# Patient Record
Sex: FEMALE | Race: WHITE | Employment: PART TIME | URBAN - METROPOLITAN AREA
[De-identification: names, ages, dates, MRNs, and addresses within clinical notes are randomized per-mention and may not be internally consistent; named-entity substitution may affect disease eponyms.]

---

## 2017-04-11 ENCOUNTER — HOSPITAL ENCOUNTER (OUTPATIENT)
Dept: MRI IMAGING | Age: 67
Discharge: HOME | End: 2017-04-11
Payer: MEDICARE

## 2017-04-11 DIAGNOSIS — M17.9 OSTEOARTHRITIS OF KNEE, UNSPECIFIED LATERALITY, UNSPECIFIED OSTEOARTHRITIS TYPE: ICD-10-CM

## 2017-04-11 PROCEDURE — 73721 MRI JNT OF LWR EXTRE W/O DYE: CPT

## 2023-03-29 LAB
ALBUMIN (G/DL) IN SER/PLAS: 4.3 G/DL (ref 3.4–5)
ALBUMIN (MG/L) IN URINE: <7 MG/L
ALBUMIN/CREATININE (UG/MG) IN URINE: NORMAL UG/MG CRT (ref 0–30)
ANION GAP IN SER/PLAS: 11 MMOL/L (ref 10–20)
APPEARANCE, URINE: CLEAR
BACTERIA, URINE: ABNORMAL /HPF
BILIRUBIN, URINE: NEGATIVE
BLOOD, URINE: NEGATIVE
CALCIDIOL (25 OH VITAMIN D3) (NG/ML) IN SER/PLAS: 40 NG/ML
CALCIUM (MG/DL) IN SER/PLAS: 9.8 MG/DL (ref 8.6–10.3)
CARBON DIOXIDE, TOTAL (MMOL/L) IN SER/PLAS: 29 MMOL/L (ref 21–32)
CHLORIDE (MMOL/L) IN SER/PLAS: 102 MMOL/L (ref 98–107)
COLOR, URINE: YELLOW
CREATININE (MG/DL) IN SER/PLAS: 1.11 MG/DL (ref 0.5–1.05)
CREATININE (MG/DL) IN URINE: 61.5 MG/DL (ref 20–320)
ERYTHROCYTE DISTRIBUTION WIDTH (RATIO) BY AUTOMATED COUNT: 13.2 % (ref 11.5–14.5)
ERYTHROCYTE MEAN CORPUSCULAR HEMOGLOBIN CONCENTRATION (G/DL) BY AUTOMATED: 33.5 G/DL (ref 32–36)
ERYTHROCYTE MEAN CORPUSCULAR VOLUME (FL) BY AUTOMATED COUNT: 91 FL (ref 80–100)
ERYTHROCYTES (10*6/UL) IN BLOOD BY AUTOMATED COUNT: 4.37 X10E12/L (ref 4–5.2)
GFR FEMALE: 52 ML/MIN/1.73M2
GLUCOSE (MG/DL) IN SER/PLAS: 120 MG/DL (ref 74–99)
GLUCOSE, URINE: NEGATIVE MG/DL
HEMATOCRIT (%) IN BLOOD BY AUTOMATED COUNT: 39.7 % (ref 36–46)
HEMOGLOBIN (G/DL) IN BLOOD: 13.3 G/DL (ref 12–16)
KETONES, URINE: NEGATIVE MG/DL
LEUKOCYTE ESTERASE, URINE: ABNORMAL
LEUKOCYTES (10*3/UL) IN BLOOD BY AUTOMATED COUNT: 8.3 X10E9/L (ref 4.4–11.3)
NITRITE, URINE: NEGATIVE
PH, URINE: 5 (ref 5–8)
PHOSPHATE (MG/DL) IN SER/PLAS: 3.1 MG/DL (ref 2.5–4.9)
PLATELETS (10*3/UL) IN BLOOD AUTOMATED COUNT: 281 X10E9/L (ref 150–450)
POTASSIUM (MMOL/L) IN SER/PLAS: 4.2 MMOL/L (ref 3.5–5.3)
PROTEIN, URINE: NEGATIVE MG/DL
RBC, URINE: <1 /HPF (ref 0–5)
SODIUM (MMOL/L) IN SER/PLAS: 138 MMOL/L (ref 136–145)
SPECIFIC GRAVITY, URINE: 1.01 (ref 1–1.03)
SQUAMOUS EPITHELIAL CELLS, URINE: <1 /HPF
UREA NITROGEN (MG/DL) IN SER/PLAS: 20 MG/DL (ref 6–23)
UROBILINOGEN, URINE: <2 MG/DL (ref 0–1.9)
WBC, URINE: 4 /HPF (ref 0–5)

## 2023-03-30 LAB — PARATHYRIN INTACT (PG/ML) IN SER/PLAS: 96.9 PG/ML (ref 18.5–88)

## 2023-03-31 LAB — URINE CULTURE: NO GROWTH

## 2023-04-24 LAB
ALANINE AMINOTRANSFERASE (SGPT) (U/L) IN SER/PLAS: 10 U/L (ref 7–45)
ALBUMIN (G/DL) IN SER/PLAS: 4.2 G/DL (ref 3.4–5)
ALKALINE PHOSPHATASE (U/L) IN SER/PLAS: 62 U/L (ref 33–136)
ANION GAP IN SER/PLAS: 14 MMOL/L (ref 10–20)
ASPARTATE AMINOTRANSFERASE (SGOT) (U/L) IN SER/PLAS: 14 U/L (ref 9–39)
BILIRUBIN TOTAL (MG/DL) IN SER/PLAS: 0.8 MG/DL (ref 0–1.2)
CALCIDIOL (25 OH VITAMIN D3) (NG/ML) IN SER/PLAS: 40 NG/ML
CALCIUM (MG/DL) IN SER/PLAS: 10.2 MG/DL (ref 8.6–10.3)
CARBON DIOXIDE, TOTAL (MMOL/L) IN SER/PLAS: 26 MMOL/L (ref 21–32)
CHLORIDE (MMOL/L) IN SER/PLAS: 103 MMOL/L (ref 98–107)
CORTISOL (UG/DL) IN SERUM - AM: 9 UG/DL (ref 5–20)
CREATININE (MG/DL) IN SER/PLAS: 1.1 MG/DL (ref 0.5–1.05)
GFR FEMALE: 52 ML/MIN/1.73M2
GLUCOSE (MG/DL) IN SER/PLAS: 91 MG/DL (ref 74–99)
POTASSIUM (MMOL/L) IN SER/PLAS: 3.9 MMOL/L (ref 3.5–5.3)
PROTEIN TOTAL: 6.7 G/DL (ref 6.4–8.2)
SODIUM (MMOL/L) IN SER/PLAS: 139 MMOL/L (ref 136–145)
THYROTROPIN (MIU/L) IN SER/PLAS BY DETECTION LIMIT <= 0.05 MIU/L: 2.47 MIU/L (ref 0.44–3.98)
UREA NITROGEN (MG/DL) IN SER/PLAS: 21 MG/DL (ref 6–23)

## 2023-05-17 LAB
BASOPHILS (10*3/UL) IN BLOOD BY AUTOMATED COUNT: 0.04 X10E9/L (ref 0–0.1)
BASOPHILS/100 LEUKOCYTES IN BLOOD BY AUTOMATED COUNT: 0.3 % (ref 0–2)
C REACTIVE PROTEIN (MG/L) IN SER/PLAS: <0.1 MG/DL
EOSINOPHILS (10*3/UL) IN BLOOD BY AUTOMATED COUNT: 0.01 X10E9/L (ref 0–0.4)
EOSINOPHILS/100 LEUKOCYTES IN BLOOD BY AUTOMATED COUNT: 0.1 % (ref 0–6)
ERYTHROCYTE DISTRIBUTION WIDTH (RATIO) BY AUTOMATED COUNT: 13.3 % (ref 11.5–14.5)
ERYTHROCYTE MEAN CORPUSCULAR HEMOGLOBIN CONCENTRATION (G/DL) BY AUTOMATED: 33.5 G/DL (ref 32–36)
ERYTHROCYTE MEAN CORPUSCULAR VOLUME (FL) BY AUTOMATED COUNT: 91 FL (ref 80–100)
ERYTHROCYTES (10*6/UL) IN BLOOD BY AUTOMATED COUNT: 4.57 X10E12/L (ref 4–5.2)
HEMATOCRIT (%) IN BLOOD BY AUTOMATED COUNT: 41.8 % (ref 36–46)
HEMOGLOBIN (G/DL) IN BLOOD: 14 G/DL (ref 12–16)
IMMATURE GRANULOCYTES/100 LEUKOCYTES IN BLOOD BY AUTOMATED COUNT: 0.8 % (ref 0–0.9)
LEUKOCYTES (10*3/UL) IN BLOOD BY AUTOMATED COUNT: 12.5 X10E9/L (ref 4.4–11.3)
LYMPHOCYTES (10*3/UL) IN BLOOD BY AUTOMATED COUNT: 2.26 X10E9/L (ref 0.8–3)
LYMPHOCYTES/100 LEUKOCYTES IN BLOOD BY AUTOMATED COUNT: 18.1 % (ref 13–44)
MONOCYTES (10*3/UL) IN BLOOD BY AUTOMATED COUNT: 0.45 X10E9/L (ref 0.05–0.8)
MONOCYTES/100 LEUKOCYTES IN BLOOD BY AUTOMATED COUNT: 3.6 % (ref 2–10)
NEUTROPHILS (10*3/UL) IN BLOOD BY AUTOMATED COUNT: 9.61 X10E9/L (ref 1.6–5.5)
NEUTROPHILS/100 LEUKOCYTES IN BLOOD BY AUTOMATED COUNT: 77.1 % (ref 40–80)
PLATELETS (10*3/UL) IN BLOOD AUTOMATED COUNT: 349 X10E9/L (ref 150–450)
RHEUMATOID FACTOR (IU/ML) IN SERUM OR PLASMA: <10 IU/ML (ref 0–15)
SEDIMENTATION RATE, ERYTHROCYTE: <1 MM/H (ref 0–30)
URATE (MG/DL) IN SER/PLAS: 7.7 MG/DL (ref 2.3–6.7)

## 2023-05-18 LAB
ANTI-CENTROMERE: <0.2 AI
ANTI-CHROMATIN: <0.2 AI
ANTI-DNA (DS): <1 IU/ML
ANTI-JO-1 IGG: <0.2 AI
ANTI-NUCLEAR ANTIBODY (ANA): NEGATIVE
ANTI-RIBOSOMAL P: <0.2 AI
ANTI-RNP: <0.2 AI
ANTI-SCL-70: <0.2 AI
ANTI-SM/RNP: <0.2 AI
ANTI-SM: <0.2 AI
ANTI-SSA: <0.2 AI
ANTI-SSB: <0.2 AI

## 2023-07-19 LAB
ALANINE AMINOTRANSFERASE (SGPT) (U/L) IN SER/PLAS: 13 U/L (ref 7–45)
ALBUMIN (G/DL) IN SER/PLAS: 4.4 G/DL (ref 3.4–5)
ALKALINE PHOSPHATASE (U/L) IN SER/PLAS: 58 U/L (ref 33–136)
ANION GAP IN SER/PLAS: 13 MMOL/L (ref 10–20)
ASPARTATE AMINOTRANSFERASE (SGOT) (U/L) IN SER/PLAS: 19 U/L (ref 9–39)
BASOPHILS (10*3/UL) IN BLOOD BY AUTOMATED COUNT: 0.06 X10E9/L (ref 0–0.1)
BASOPHILS/100 LEUKOCYTES IN BLOOD BY AUTOMATED COUNT: 0.7 % (ref 0–2)
BILIRUBIN TOTAL (MG/DL) IN SER/PLAS: 0.6 MG/DL (ref 0–1.2)
C REACTIVE PROTEIN (MG/L) IN SER/PLAS: 0.48 MG/DL
CALCIUM (MG/DL) IN SER/PLAS: 10.2 MG/DL (ref 8.6–10.3)
CARBON DIOXIDE, TOTAL (MMOL/L) IN SER/PLAS: 27 MMOL/L (ref 21–32)
CHLORIDE (MMOL/L) IN SER/PLAS: 102 MMOL/L (ref 98–107)
CREATININE (MG/DL) IN SER/PLAS: 1.06 MG/DL (ref 0.5–1.05)
EOSINOPHILS (10*3/UL) IN BLOOD BY AUTOMATED COUNT: 0.15 X10E9/L (ref 0–0.4)
EOSINOPHILS/100 LEUKOCYTES IN BLOOD BY AUTOMATED COUNT: 1.8 % (ref 0–6)
ERYTHROCYTE DISTRIBUTION WIDTH (RATIO) BY AUTOMATED COUNT: 13.5 % (ref 11.5–14.5)
ERYTHROCYTE MEAN CORPUSCULAR HEMOGLOBIN CONCENTRATION (G/DL) BY AUTOMATED: 33.3 G/DL (ref 32–36)
ERYTHROCYTE MEAN CORPUSCULAR VOLUME (FL) BY AUTOMATED COUNT: 91 FL (ref 80–100)
ERYTHROCYTES (10*6/UL) IN BLOOD BY AUTOMATED COUNT: 4.27 X10E12/L (ref 4–5.2)
GFR FEMALE: 55 ML/MIN/1.73M2
GLUCOSE (MG/DL) IN SER/PLAS: 104 MG/DL (ref 74–99)
HEMATOCRIT (%) IN BLOOD BY AUTOMATED COUNT: 39 % (ref 36–46)
HEMOGLOBIN (G/DL) IN BLOOD: 13 G/DL (ref 12–16)
IMMATURE GRANULOCYTES/100 LEUKOCYTES IN BLOOD BY AUTOMATED COUNT: 0.2 % (ref 0–0.9)
LEUKOCYTES (10*3/UL) IN BLOOD BY AUTOMATED COUNT: 8.1 X10E9/L (ref 4.4–11.3)
LYMPHOCYTES (10*3/UL) IN BLOOD BY AUTOMATED COUNT: 1.95 X10E9/L (ref 0.8–3)
LYMPHOCYTES/100 LEUKOCYTES IN BLOOD BY AUTOMATED COUNT: 24 % (ref 13–44)
MONOCYTES (10*3/UL) IN BLOOD BY AUTOMATED COUNT: 0.52 X10E9/L (ref 0.05–0.8)
MONOCYTES/100 LEUKOCYTES IN BLOOD BY AUTOMATED COUNT: 6.4 % (ref 2–10)
NEUTROPHILS (10*3/UL) IN BLOOD BY AUTOMATED COUNT: 5.44 X10E9/L (ref 1.6–5.5)
NEUTROPHILS/100 LEUKOCYTES IN BLOOD BY AUTOMATED COUNT: 66.9 % (ref 40–80)
PLATELETS (10*3/UL) IN BLOOD AUTOMATED COUNT: 281 X10E9/L (ref 150–450)
POTASSIUM (MMOL/L) IN SER/PLAS: 3.9 MMOL/L (ref 3.5–5.3)
PROTEIN TOTAL: 7.3 G/DL (ref 6.4–8.2)
SEDIMENTATION RATE, ERYTHROCYTE: 9 MM/H (ref 0–30)
SODIUM (MMOL/L) IN SER/PLAS: 138 MMOL/L (ref 136–145)
URATE (MG/DL) IN SER/PLAS: 8.7 MG/DL (ref 2.3–6.7)
UREA NITROGEN (MG/DL) IN SER/PLAS: 23 MG/DL (ref 6–23)

## 2023-07-20 LAB — CITRULLINE ANTIBODY, IGG: 21 U/ML

## 2023-08-07 LAB
ALANINE AMINOTRANSFERASE (SGPT) (U/L) IN SER/PLAS: 13 U/L (ref 7–45)
ALBUMIN (G/DL) IN SER/PLAS: 3.9 G/DL (ref 3.4–5)
ALKALINE PHOSPHATASE (U/L) IN SER/PLAS: 51 U/L (ref 33–136)
ANION GAP IN SER/PLAS: 14 MMOL/L (ref 10–20)
ASPARTATE AMINOTRANSFERASE (SGOT) (U/L) IN SER/PLAS: 13 U/L (ref 9–39)
BILIRUBIN TOTAL (MG/DL) IN SER/PLAS: 0.5 MG/DL (ref 0–1.2)
CALCIDIOL (25 OH VITAMIN D3) (NG/ML) IN SER/PLAS: 30 NG/ML
CALCIUM (MG/DL) IN SER/PLAS: 9.8 MG/DL (ref 8.6–10.3)
CARBON DIOXIDE, TOTAL (MMOL/L) IN SER/PLAS: 25 MMOL/L (ref 21–32)
CHLORIDE (MMOL/L) IN SER/PLAS: 103 MMOL/L (ref 98–107)
CORTISOL (UG/DL) IN SERUM - AM: 0.5 UG/DL (ref 5–20)
CREATININE (MG/DL) IN SER/PLAS: 1 MG/DL (ref 0.5–1.05)
GFR FEMALE: 59 ML/MIN/1.73M2
GLUCOSE (MG/DL) IN SER/PLAS: 99 MG/DL (ref 74–99)
PARATHYRIN INTACT (PG/ML) IN SER/PLAS: 75.3 PG/ML (ref 18.5–88)
PHOSPHATE (MG/DL) IN SER/PLAS: 4 MG/DL (ref 2.5–4.9)
POC CALCIUM IONIZED (MMOL/L) IN BLOOD: 1.31 MMOL/L (ref 1.1–1.33)
POTASSIUM (MMOL/L) IN SER/PLAS: 3.9 MMOL/L (ref 3.5–5.3)
PROTEIN TOTAL: 6.2 G/DL (ref 6.4–8.2)
SODIUM (MMOL/L) IN SER/PLAS: 138 MMOL/L (ref 136–145)
THYROTROPIN (MIU/L) IN SER/PLAS BY DETECTION LIMIT <= 0.05 MIU/L: 4.82 MIU/L (ref 0.44–3.98)
THYROXINE (T4) FREE (NG/DL) IN SER/PLAS: 0.95 NG/DL (ref 0.61–1.12)
UREA NITROGEN (MG/DL) IN SER/PLAS: 27 MG/DL (ref 6–23)

## 2023-08-21 LAB
BACTERIA, URINE: ABNORMAL /HPF
RBC, URINE: <1 /HPF (ref 0–5)
SQUAMOUS EPITHELIAL CELLS, URINE: 1 /HPF
WBC, URINE: 2 /HPF (ref 0–5)

## 2023-08-22 LAB — URINE CULTURE: NO GROWTH

## 2023-09-18 LAB
ALANINE AMINOTRANSFERASE (SGPT) (U/L) IN SER/PLAS: 11 U/L (ref 7–45)
ALBUMIN (G/DL) IN SER/PLAS: 4.2 G/DL (ref 3.4–5)
ALKALINE PHOSPHATASE (U/L) IN SER/PLAS: 54 U/L (ref 33–136)
ANION GAP IN SER/PLAS: 10 MMOL/L (ref 10–20)
ASPARTATE AMINOTRANSFERASE (SGOT) (U/L) IN SER/PLAS: 17 U/L (ref 9–39)
BASOPHILS (10*3/UL) IN BLOOD BY AUTOMATED COUNT: 0.06 X10E9/L (ref 0–0.1)
BASOPHILS/100 LEUKOCYTES IN BLOOD BY AUTOMATED COUNT: 0.7 % (ref 0–2)
BILIRUBIN TOTAL (MG/DL) IN SER/PLAS: 0.6 MG/DL (ref 0–1.2)
C REACTIVE PROTEIN (MG/L) IN SER/PLAS: 0.18 MG/DL
CALCIUM (MG/DL) IN SER/PLAS: 10.3 MG/DL (ref 8.6–10.3)
CARBON DIOXIDE, TOTAL (MMOL/L) IN SER/PLAS: 30 MMOL/L (ref 21–32)
CHLORIDE (MMOL/L) IN SER/PLAS: 103 MMOL/L (ref 98–107)
CREATININE (MG/DL) IN SER/PLAS: 0.99 MG/DL (ref 0.5–1.05)
EOSINOPHILS (10*3/UL) IN BLOOD BY AUTOMATED COUNT: 0.16 X10E9/L (ref 0–0.4)
EOSINOPHILS/100 LEUKOCYTES IN BLOOD BY AUTOMATED COUNT: 1.8 % (ref 0–6)
ERYTHROCYTE DISTRIBUTION WIDTH (RATIO) BY AUTOMATED COUNT: 13.1 % (ref 11.5–14.5)
ERYTHROCYTE MEAN CORPUSCULAR HEMOGLOBIN CONCENTRATION (G/DL) BY AUTOMATED: 33.1 G/DL (ref 32–36)
ERYTHROCYTE MEAN CORPUSCULAR VOLUME (FL) BY AUTOMATED COUNT: 93 FL (ref 80–100)
ERYTHROCYTES (10*6/UL) IN BLOOD BY AUTOMATED COUNT: 4.4 X10E12/L (ref 4–5.2)
GFR FEMALE: 59 ML/MIN/1.73M2
GLUCOSE (MG/DL) IN SER/PLAS: 102 MG/DL (ref 74–99)
HEMATOCRIT (%) IN BLOOD BY AUTOMATED COUNT: 40.8 % (ref 36–46)
HEMOGLOBIN (G/DL) IN BLOOD: 13.5 G/DL (ref 12–16)
IMMATURE GRANULOCYTES/100 LEUKOCYTES IN BLOOD BY AUTOMATED COUNT: 0.3 % (ref 0–0.9)
LEUKOCYTES (10*3/UL) IN BLOOD BY AUTOMATED COUNT: 9 X10E9/L (ref 4.4–11.3)
LYMPHOCYTES (10*3/UL) IN BLOOD BY AUTOMATED COUNT: 1.7 X10E9/L (ref 0.8–3)
LYMPHOCYTES/100 LEUKOCYTES IN BLOOD BY AUTOMATED COUNT: 19 % (ref 13–44)
MONOCYTES (10*3/UL) IN BLOOD BY AUTOMATED COUNT: 0.43 X10E9/L (ref 0.05–0.8)
MONOCYTES/100 LEUKOCYTES IN BLOOD BY AUTOMATED COUNT: 4.8 % (ref 2–10)
NEUTROPHILS (10*3/UL) IN BLOOD BY AUTOMATED COUNT: 6.59 X10E9/L (ref 1.6–5.5)
NEUTROPHILS/100 LEUKOCYTES IN BLOOD BY AUTOMATED COUNT: 73.4 % (ref 40–80)
PLATELETS (10*3/UL) IN BLOOD AUTOMATED COUNT: 297 X10E9/L (ref 150–450)
POTASSIUM (MMOL/L) IN SER/PLAS: 4.4 MMOL/L (ref 3.5–5.3)
PROTEIN TOTAL: 7 G/DL (ref 6.4–8.2)
SEDIMENTATION RATE, ERYTHROCYTE: 3 MM/H (ref 0–30)
SODIUM (MMOL/L) IN SER/PLAS: 139 MMOL/L (ref 136–145)
URATE (MG/DL) IN SER/PLAS: 8.4 MG/DL (ref 2.3–6.7)
UREA NITROGEN (MG/DL) IN SER/PLAS: 22 MG/DL (ref 6–23)

## 2023-09-20 LAB
BACTERIA, URINE: ABNORMAL /HPF
HYALINE CASTS, URINE: ABNORMAL /LPF
RBC, URINE: 1 /HPF (ref 0–5)
SQUAMOUS EPITHELIAL CELLS, URINE: 3 /HPF
WBC, URINE: 12 /HPF (ref 0–5)

## 2023-09-21 LAB — URINE CULTURE: NORMAL

## 2023-10-23 ENCOUNTER — LAB (OUTPATIENT)
Dept: LAB | Facility: LAB | Age: 73
End: 2023-10-23
Payer: COMMERCIAL

## 2023-10-23 DIAGNOSIS — D35.00 BENIGN NEOPLASM OF UNSPECIFIED ADRENAL GLAND: ICD-10-CM

## 2023-10-23 DIAGNOSIS — E55.9 VITAMIN D DEFICIENCY, UNSPECIFIED: ICD-10-CM

## 2023-10-23 DIAGNOSIS — D51.9 VITAMIN B12 DEFICIENCY ANEMIA, UNSPECIFIED: ICD-10-CM

## 2023-10-23 DIAGNOSIS — D35.00 BENIGN NEOPLASM OF UNSPECIFIED ADRENAL GLAND: Primary | ICD-10-CM

## 2023-10-23 LAB
25(OH)D3 SERPL-MCNC: 28 NG/ML (ref 30–100)
ALBUMIN SERPL BCP-MCNC: 4.2 G/DL (ref 3.4–5)
ALP SERPL-CCNC: 60 U/L (ref 33–136)
ALT SERPL W P-5'-P-CCNC: 8 U/L (ref 7–45)
ANION GAP SERPL CALC-SCNC: 14 MMOL/L (ref 10–20)
AST SERPL W P-5'-P-CCNC: 14 U/L (ref 9–39)
BILIRUB SERPL-MCNC: 0.9 MG/DL (ref 0–1.2)
BUN SERPL-MCNC: 23 MG/DL (ref 6–23)
CALCIUM SERPL-MCNC: 10.3 MG/DL (ref 8.6–10.3)
CHLORIDE SERPL-SCNC: 100 MMOL/L (ref 98–107)
CO2 SERPL-SCNC: 30 MMOL/L (ref 21–32)
CORTIS AM PEAK SERPL-MSCNC: 7.6 UG/DL (ref 5–20)
CREAT SERPL-MCNC: 1.18 MG/DL (ref 0.5–1.05)
GFR SERPL CREATININE-BSD FRML MDRD: 48 ML/MIN/1.73M*2
GLUCOSE SERPL-MCNC: 101 MG/DL (ref 74–99)
POTASSIUM SERPL-SCNC: 4.2 MMOL/L (ref 3.5–5.3)
PROT SERPL-MCNC: 6.6 G/DL (ref 6.4–8.2)
SODIUM SERPL-SCNC: 140 MMOL/L (ref 136–145)
T4 FREE SERPL-MCNC: 0.88 NG/DL (ref 0.61–1.12)
TSH SERPL-ACNC: 2.06 MIU/L (ref 0.44–3.98)

## 2023-10-23 PROCEDURE — 36415 COLL VENOUS BLD VENIPUNCTURE: CPT

## 2023-10-23 PROCEDURE — 82533 TOTAL CORTISOL: CPT

## 2023-10-23 PROCEDURE — 82306 VITAMIN D 25 HYDROXY: CPT

## 2023-10-23 PROCEDURE — 84439 ASSAY OF FREE THYROXINE: CPT | Mod: WAIVER OF LIABILITY ON FILE

## 2023-10-23 PROCEDURE — 84443 ASSAY THYROID STIM HORMONE: CPT | Mod: WAIVER OF LIABILITY ON FILE

## 2023-10-23 PROCEDURE — 80053 COMPREHEN METABOLIC PANEL: CPT

## 2023-11-06 ENCOUNTER — APPOINTMENT (OUTPATIENT)
Dept: RHEUMATOLOGY | Facility: CLINIC | Age: 73
End: 2023-11-06
Payer: COMMERCIAL

## 2023-11-06 ENCOUNTER — APPOINTMENT (OUTPATIENT)
Dept: UROLOGY | Facility: CLINIC | Age: 73
End: 2023-11-06
Payer: COMMERCIAL

## 2023-11-10 PROBLEM — E78.5 HYPERLIPIDEMIA: Status: ACTIVE | Noted: 2023-11-10

## 2023-11-10 PROBLEM — J30.9 ALLERGIC RHINITIS: Status: ACTIVE | Noted: 2023-11-10

## 2023-11-10 PROBLEM — T78.40XA ALLERGIES: Status: ACTIVE | Noted: 2023-11-10

## 2023-11-10 PROBLEM — H02.409 PTOSIS: Status: ACTIVE | Noted: 2023-11-10

## 2023-11-10 PROBLEM — R51.9 HEADACHE: Status: ACTIVE | Noted: 2023-11-10

## 2023-11-10 PROBLEM — D35.00 BENIGN NEOPLASM OF ADRENAL GLAND: Status: ACTIVE | Noted: 2023-11-10

## 2023-11-10 PROBLEM — M25.569 KNEE PAIN: Status: ACTIVE | Noted: 2023-11-10

## 2023-11-10 PROBLEM — R33.9 INCOMPLETE BLADDER EMPTYING: Status: ACTIVE | Noted: 2023-11-10

## 2023-11-10 PROBLEM — R53.83 FATIGUE: Status: ACTIVE | Noted: 2023-11-10

## 2023-11-10 PROBLEM — R50.9 FEVER: Status: ACTIVE | Noted: 2023-11-10

## 2023-11-10 PROBLEM — M25.572 ANKLE PAIN, LEFT: Status: ACTIVE | Noted: 2023-11-10

## 2023-11-10 PROBLEM — R41.9 ALTERATION OF AWARENESS: Status: ACTIVE | Noted: 2023-11-10

## 2023-11-10 PROBLEM — R40.4 STARING EPISODES: Status: ACTIVE | Noted: 2023-11-10

## 2023-11-10 PROBLEM — H53.9 VISION CHANGES: Status: ACTIVE | Noted: 2023-11-10

## 2023-11-10 PROBLEM — I65.29 CAROTID ARTERY STENOSIS: Status: ACTIVE | Noted: 2023-11-10

## 2023-11-10 PROBLEM — I83.899 VARICOSE VEINS OF LEG WITH SWELLING: Status: ACTIVE | Noted: 2023-11-10

## 2023-11-10 PROBLEM — E27.9 ADRENAL DISORDER (MULTI): Status: ACTIVE | Noted: 2023-11-10

## 2023-11-10 PROBLEM — R27.8 DECREASED COORDINATION: Status: ACTIVE | Noted: 2023-11-10

## 2023-11-10 PROBLEM — R41.89 COGNITIVE IMPAIRMENT: Status: ACTIVE | Noted: 2023-11-10

## 2023-11-10 PROBLEM — R09.81 SINUS CONGESTION: Status: ACTIVE | Noted: 2023-11-10

## 2023-11-10 PROBLEM — I10 HYPERTENSION: Status: ACTIVE | Noted: 2023-11-10

## 2023-11-10 PROBLEM — R73.09 ELEVATED GLUCOSE: Status: ACTIVE | Noted: 2023-11-10

## 2023-11-10 PROBLEM — Z96.659 S/P TKR (TOTAL KNEE REPLACEMENT): Status: ACTIVE | Noted: 2023-11-10

## 2023-11-10 PROBLEM — R26.89 IMBALANCE: Status: ACTIVE | Noted: 2023-11-10

## 2023-11-10 PROBLEM — D12.2 ADENOMATOUS POLYP OF ASCENDING COLON: Status: ACTIVE | Noted: 2023-11-10

## 2023-11-10 PROBLEM — F41.9 ANXIETY: Status: ACTIVE | Noted: 2023-11-10

## 2023-11-10 PROBLEM — H02.402 PTOSIS OF LEFT EYELID: Status: ACTIVE | Noted: 2023-11-10

## 2023-11-10 PROBLEM — D44.10 NEOPLASM OF UNCERTAIN BEHAVIOR OF ADRENAL GLAND: Status: ACTIVE | Noted: 2023-11-10

## 2023-11-10 PROBLEM — R11.0 NAUSEA IN ADULT: Status: ACTIVE | Noted: 2023-11-10

## 2023-11-10 PROBLEM — E27.1 ADDISON'S DISEASE (MULTI): Status: ACTIVE | Noted: 2023-11-10

## 2023-11-10 PROBLEM — R41.9 COGNITIVE COMPLAINTS: Status: ACTIVE | Noted: 2023-11-10

## 2023-11-10 PROBLEM — N28.1 CYST OF RIGHT KIDNEY: Status: ACTIVE | Noted: 2023-11-10

## 2023-11-10 PROBLEM — R26.9 GAIT DISTURBANCE: Status: ACTIVE | Noted: 2023-11-10

## 2023-11-10 PROBLEM — I65.23 BILATERAL CAROTID ARTERY STENOSIS: Status: ACTIVE | Noted: 2023-11-10

## 2023-11-10 PROBLEM — M10.9 INTERCRITICAL GOUT: Status: ACTIVE | Noted: 2023-11-10

## 2023-11-10 PROBLEM — N95.8 GENITOURINARY SYNDROME OF MENOPAUSE: Status: ACTIVE | Noted: 2023-11-10

## 2023-11-10 PROBLEM — N18.9 CKD (CHRONIC KIDNEY DISEASE): Status: ACTIVE | Noted: 2023-11-10

## 2023-11-10 PROBLEM — N39.0 RECURRENT URINARY TRACT INFECTION: Status: ACTIVE | Noted: 2023-01-17

## 2023-11-10 PROBLEM — R42 VERTIGO: Status: ACTIVE | Noted: 2023-11-10

## 2023-11-10 PROBLEM — R19.7 ACUTE DIARRHEA: Status: ACTIVE | Noted: 2023-11-10

## 2023-11-10 PROBLEM — H81.10 BPV (BENIGN POSITIONAL VERTIGO): Status: ACTIVE | Noted: 2023-11-10

## 2023-11-10 PROBLEM — R94.4 DECREASED GFR: Status: ACTIVE | Noted: 2023-11-10

## 2023-11-10 PROBLEM — N31.8 FREQUENCY-URGENCY SYNDROME: Status: ACTIVE | Noted: 2023-01-17

## 2023-11-10 PROBLEM — R30.0 DYSURIA: Status: ACTIVE | Noted: 2023-11-10

## 2023-11-10 PROBLEM — I87.8 VENOUS STASIS OF BOTH LOWER EXTREMITIES: Status: ACTIVE | Noted: 2023-11-10

## 2023-11-10 PROBLEM — M19.90 INFLAMMATORY ARTHRITIS: Status: ACTIVE | Noted: 2023-11-10

## 2023-11-10 PROBLEM — J01.90 ACUTE SINUS INFECTION: Status: ACTIVE | Noted: 2023-11-10

## 2023-11-10 PROBLEM — R06.02 SHORTNESS OF BREATH ON EXERTION: Status: ACTIVE | Noted: 2023-11-10

## 2023-11-10 PROBLEM — I83.819 VARICOSE VEINS WITH PAIN: Status: ACTIVE | Noted: 2023-11-10

## 2023-11-10 PROBLEM — I10 BENIGN ESSENTIAL HYPERTENSION: Status: ACTIVE | Noted: 2023-11-10

## 2023-11-10 PROBLEM — R19.5 POSITIVE COLORECTAL CANCER SCREENING USING DNA-BASED STOOL TEST: Status: ACTIVE | Noted: 2023-11-10

## 2023-11-10 PROBLEM — N28.9 RENAL INSUFFICIENCY: Status: ACTIVE | Noted: 2023-11-10

## 2023-11-10 PROBLEM — R93.7 ABNORMAL BONE XRAY: Status: ACTIVE | Noted: 2023-11-10

## 2023-11-10 PROBLEM — M25.469 KNEE EFFUSION: Status: ACTIVE | Noted: 2023-11-10

## 2023-11-10 PROBLEM — E66.3 OVERWEIGHT WITH BODY MASS INDEX (BMI) OF 28 TO 28.9 IN ADULT: Status: ACTIVE | Noted: 2023-11-10

## 2023-11-10 PROBLEM — E53.8 B12 DEFICIENCY: Status: ACTIVE | Noted: 2023-11-10

## 2023-11-10 PROBLEM — I87.393 CHRONIC VENOUS HYPERTENSION WITH COMPLICATION INVOLVING BOTH SIDES: Status: ACTIVE | Noted: 2023-11-10

## 2023-11-10 PROBLEM — M17.12 OSTEOARTHRITIS OF LEFT KNEE: Status: ACTIVE | Noted: 2023-11-10

## 2023-11-10 PROBLEM — R05.9 COUGH: Status: ACTIVE | Noted: 2023-11-10

## 2023-11-10 PROBLEM — R09.89 BRUIT OF RIGHT CAROTID ARTERY: Status: ACTIVE | Noted: 2023-11-10

## 2023-11-10 PROBLEM — E79.0 HYPERURICEMIA: Status: ACTIVE | Noted: 2023-11-10

## 2023-11-10 PROBLEM — M25.50 ARTHRALGIA: Status: ACTIVE | Noted: 2023-11-10

## 2023-11-10 RX ORDER — HYDROCORTISONE 10 MG/1
1.5 TABLET ORAL EVERY MORNING
COMMUNITY
End: 2023-11-17 | Stop reason: SDUPTHER

## 2023-11-10 RX ORDER — OXYBUTYNIN CHLORIDE 5 MG/1
5 TABLET, EXTENDED RELEASE ORAL DAILY
COMMUNITY
Start: 2023-09-18 | End: 2023-11-17 | Stop reason: WASHOUT

## 2023-11-10 RX ORDER — CIPROFLOXACIN 500 MG/1
500 TABLET ORAL 2 TIMES DAILY
COMMUNITY
End: 2023-11-17 | Stop reason: SDUPTHER

## 2023-11-10 RX ORDER — METOPROLOL SUCCINATE 25 MG/1
25 TABLET, EXTENDED RELEASE ORAL DAILY
COMMUNITY
End: 2023-11-17 | Stop reason: SDUPTHER

## 2023-11-10 RX ORDER — HYDROCHLOROTHIAZIDE 12.5 MG/1
12.5 TABLET ORAL DAILY
COMMUNITY
End: 2023-11-17 | Stop reason: SDUPTHER

## 2023-11-10 RX ORDER — ESTRADIOL 0.1 MG/G
1 CREAM VAGINAL DAILY
COMMUNITY
Start: 2023-07-05 | End: 2024-01-10 | Stop reason: ALTCHOICE

## 2023-11-10 RX ORDER — CLOPIDOGREL BISULFATE 75 MG/1
75 TABLET ORAL DAILY
COMMUNITY
End: 2023-11-17 | Stop reason: SDUPTHER

## 2023-11-13 PROBLEM — N30.90 RECURRENT CYSTITIS: Status: ACTIVE | Noted: 2023-11-13

## 2023-11-13 PROBLEM — M25.552 PAIN OF LEFT HIP JOINT: Status: ACTIVE | Noted: 2023-11-13

## 2023-11-13 PROBLEM — Z79.02 PLATELET INHIBITION DUE TO PLAVIX: Status: ACTIVE | Noted: 2023-11-13

## 2023-11-13 PROBLEM — S29.9XXA RIB INJURY: Status: ACTIVE | Noted: 2023-11-13

## 2023-11-13 PROBLEM — N81.10 CYSTOCELE, UNSPECIFIED: Status: ACTIVE | Noted: 2023-11-13

## 2023-11-13 PROBLEM — R76.8 POSITIVE ANTI-CCP TEST: Status: ACTIVE | Noted: 2023-11-13

## 2023-11-16 PROBLEM — Z00.00 MEDICARE ANNUAL WELLNESS VISIT, SUBSEQUENT: Status: ACTIVE | Noted: 2023-11-16

## 2023-11-16 PROBLEM — Z71.2 ENCOUNTER TO DISCUSS TEST RESULTS: Status: ACTIVE | Noted: 2023-11-16

## 2023-11-16 NOTE — PROGRESS NOTES
Subjective :  Chief Complaint: Daysi Resendiz is an 73 y.o. female here for an annual wellness visit and general medical care and f/u.     HPI:  Here for an annual wellness visit.    Ophthalmology:  up to date  Dental: up to date.  Getting work done now.  Diet:  eating trouble, appetite is down right now.  She has been on and off of steroids.  Was put on a Mediterranean diet per nephrology.  Sleep:  good  Mood:  good, no problems  Exercise:  no formal exercise  Medications:  no concerns.    Interval History:  Left knee and ankle issue this past summer.  Saw Dr. Paz again and then was sent to Rheum.  Rheumatology thought she had gout.  Dr. 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  Resolved and has not come back.  Ears either itch or hurt.  Symptoms about a month ago.  Never had her ears cleaned, wonders if they need to be cleaned.  Sees Dr. Zimmer for endocrinology.  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  Now he is not covered by insurance and she would like a new referral.    She still protects herself from infection.          Review of Systems  All systems reviewed and negative except for what was mentioned in the HPI    Objective   /80 (BP Location: Right arm, Patient Position: Sitting, BP Cuff Size: Large adult)   Pulse 69   Temp 36.5 °C (97.7 °F) (Tympanic)   Wt 89.4 kg (197 lb)   BMI 30.85 kg/m²     Physical Exam  General:  Alert, oriented, no acute distress  Eyes:  Sclerae white, PER, conjunctivae clear  ENT:  No nasal congestion.    Neck: Supple  Endocrine:  No thyromegaly. No thyroid nodes.   Respiratory:  Normal breath sounds.  No wheezing, rhonchi nor crackles.  No dyspnea.  Cardiovascular:  S1 and S2 positive.  Regular rate and rhythm.  No gallops.  No murmurs.  Vascular:  Trace edema bilateral legs.  Skin warm and dry.  CNS:  No gross neurological deficits.  Gait within normal limits.    Psychiatric:  Affect is positive and appropriate.  No depression.  No  anxiety.    Imaging:  No results found.     Labs reviewed:    Lab Results   Component Value Date    WBC 9.0 09/18/2023    RBC 4.40 09/18/2023    HGB 13.5 09/18/2023    HCT 40.8 09/18/2023    MCV 93 09/18/2023     09/18/2023    CHOL 153 05/10/2022    TRIG 113 05/10/2022    HDL 49.0 05/10/2022    ALT 8 10/23/2023    AST 14 10/23/2023     10/23/2023    K 4.2 10/23/2023     10/23/2023    CREATININE 1.18 (H) 10/23/2023    BUN 23 10/23/2023    CO2 30 10/23/2023    TSH 2.06 10/23/2023    INR 1.0 06/24/2020    HGBA1C 5.5 06/08/2021       Past Medical, Surgical, and Family History reviewed and updated in chart.    I have reviewed and reconciled the medication list with the patient today.   Current Outpatient Medications:     clopidogrel (Plavix) 75 mg tablet, Take 1 tablet (75 mg) by mouth once daily., Disp: , Rfl:     estradiol (Estrace) 0.01 % (0.1 mg/gram) vaginal cream, Insert 1 Applicatorful into the vagina once daily., Disp: , Rfl:     hydroCHLOROthiazide (HYDRODiuril) 12.5 mg tablet, Take 1 tablet (12.5 mg) by mouth once daily., Disp: , Rfl:     hydrocortisone (Cortef) 10 mg tablet, Take 1.5 tablets (15 mg) by mouth once daily in the morning.  TAKE ONE AND A HALF TABLETS BY MOUTH IN THE MORNING THEN TAKE HALF A TABLET AT 3PM THEN TAKE HALF A TABLET AT BEDTIME, Disp: , Rfl:     metoprolol succinate XL (Toprol-XL) 25 mg 24 hr tablet, Take 1 tablet (25 mg) by mouth once daily., Disp: , Rfl:     ciprofloxacin (Cipro) 500 mg tablet, Take 1 tablet (500 mg) by mouth 2 times a day., Disp: , Rfl:     oxybutynin XL (Ditropan-XL) 5 mg 24 hr tablet, Take 1 tablet (5 mg) by mouth once daily., Disp: , Rfl:      List of current healthcare providers:  Patient Care Team:  Apple Tello MD as PCP - General  Apple Tello MD as PCP - [facility]     Assessment/Plan :  Problem List Items Addressed This Visit       Nora's disease (CMS/HCC)     Referral for new  Endocrinology given.         Benign essential hypertension     Monitoring.  Usually better.  No changes today.         Encounter to discuss test results    Hyperlipidemia     Controlled.  Continue present management.         Hypertension     See above.         Medicare annual wellness visit, subsequent - Primary    Urinary symptom or sign     Antibiotic ordered.           Other Visit Diagnoses       Osteoporosis, unspecified osteoporosis type, unspecified pathological fracture presence              The following health maintenance schedule was reviewed with the patient and provided in printed form in the after visit summary:  Health Maintenance   Topic Date Due    Hepatitis C Screening  Never done    Hepatitis A Vaccines (1 of 2 - Risk 2-dose series) Never done    DTaP/Tdap/Td Vaccines (1 - Tdap) Never done    Zoster Vaccines (1 of 2) Never done    Hepatitis B Vaccines (1 of 3 - Risk 3-dose series) Never done    COVID-19 Vaccine (3 - Moderna series) 05/29/2021    Diabetes Screening  06/08/2022    Influenza Vaccine (1) 06/30/2024 (Originally 9/1/2023)    Pneumococcal Vaccine: 65+ Years (1 - PCV) 11/17/2024 (Originally 2/7/2013)    Medicare Annual Wellness Visit (AWV)  11/18/2024    Lipid Panel  05/10/2027    Colorectal Cancer Screening  10/15/2031    HIB Vaccines  Aged Out    IPV Vaccines  Aged Out    Meningococcal Vaccine  Aged Out    Rotavirus Vaccines  Aged Out    HPV Vaccines  Aged Out    Bone Density Scan  Discontinued    Irritable Bowel Syndrome  Discontinued       Advance Care Planning   No living will or POA.        Reviewed lab/testing results with the patient and provided patient education regarding the results.  Continue current medications as listed  Follow up in 6 months or sooner as needed.

## 2023-11-17 ENCOUNTER — OFFICE VISIT (OUTPATIENT)
Dept: PRIMARY CARE | Facility: CLINIC | Age: 73
End: 2023-11-17
Payer: COMMERCIAL

## 2023-11-17 VITALS
WEIGHT: 197 LBS | SYSTOLIC BLOOD PRESSURE: 140 MMHG | DIASTOLIC BLOOD PRESSURE: 80 MMHG | HEART RATE: 69 BPM | BODY MASS INDEX: 30.85 KG/M2 | TEMPERATURE: 97.7 F

## 2023-11-17 DIAGNOSIS — E27.1 ADDISON'S DISEASE (MULTI): ICD-10-CM

## 2023-11-17 DIAGNOSIS — E78.5 HYPERLIPIDEMIA, UNSPECIFIED HYPERLIPIDEMIA TYPE: ICD-10-CM

## 2023-11-17 DIAGNOSIS — Z00.00 ROUTINE GENERAL MEDICAL EXAMINATION AT HEALTH CARE FACILITY: ICD-10-CM

## 2023-11-17 DIAGNOSIS — M81.0 OSTEOPOROSIS, UNSPECIFIED OSTEOPOROSIS TYPE, UNSPECIFIED PATHOLOGICAL FRACTURE PRESENCE: ICD-10-CM

## 2023-11-17 DIAGNOSIS — Z71.2 ENCOUNTER TO DISCUSS TEST RESULTS: ICD-10-CM

## 2023-11-17 DIAGNOSIS — Z12.31 BREAST CANCER SCREENING BY MAMMOGRAM: ICD-10-CM

## 2023-11-17 DIAGNOSIS — E53.8 B12 DEFICIENCY: ICD-10-CM

## 2023-11-17 DIAGNOSIS — I10 BENIGN ESSENTIAL HYPERTENSION: ICD-10-CM

## 2023-11-17 DIAGNOSIS — I15.9 SECONDARY HYPERTENSION: ICD-10-CM

## 2023-11-17 DIAGNOSIS — R39.9 URINARY SYMPTOM OR SIGN: ICD-10-CM

## 2023-11-17 DIAGNOSIS — Z00.00 MEDICARE ANNUAL WELLNESS VISIT, SUBSEQUENT: Primary | ICD-10-CM

## 2023-11-17 LAB
APPEARANCE UR: CLEAR
BILIRUB UR STRIP.AUTO-MCNC: NEGATIVE MG/DL
COLOR UR: YELLOW
GLUCOSE UR STRIP.AUTO-MCNC: NEGATIVE MG/DL
KETONES UR STRIP.AUTO-MCNC: NEGATIVE MG/DL
LEUKOCYTE ESTERASE UR QL STRIP.AUTO: ABNORMAL
MUCOUS THREADS #/AREA URNS AUTO: ABNORMAL /LPF
NITRITE UR QL STRIP.AUTO: NEGATIVE
PH UR STRIP.AUTO: 5 [PH]
POC APPEARANCE, URINE: ABNORMAL
POC BILIRUBIN, URINE: NEGATIVE
POC BLOOD, URINE: NEGATIVE
POC COLOR, URINE: ABNORMAL
POC GLUCOSE, URINE: NEGATIVE MG/DL
POC KETONES, URINE: ABNORMAL MG/DL
POC LEUKOCYTES, URINE: ABNORMAL
POC NITRITE,URINE: NEGATIVE
POC PH, URINE: >=9 PH
POC PROTEIN, URINE: ABNORMAL MG/DL
POC SPECIFIC GRAVITY, URINE: <=1.005
POC UROBILINOGEN, URINE: 0.2 EU/DL
PROT UR STRIP.AUTO-MCNC: NEGATIVE MG/DL
RBC # UR STRIP.AUTO: NEGATIVE /UL
RBC #/AREA URNS AUTO: ABNORMAL /HPF
SP GR UR STRIP.AUTO: 1.02
SQUAMOUS #/AREA URNS AUTO: ABNORMAL /HPF
UROBILINOGEN UR STRIP.AUTO-MCNC: <2 MG/DL
WBC #/AREA URNS AUTO: ABNORMAL /HPF

## 2023-11-17 PROCEDURE — 1036F TOBACCO NON-USER: CPT | Performed by: FAMILY MEDICINE

## 2023-11-17 PROCEDURE — 1159F MED LIST DOCD IN RCRD: CPT | Performed by: FAMILY MEDICINE

## 2023-11-17 PROCEDURE — 3077F SYST BP >= 140 MM HG: CPT | Performed by: FAMILY MEDICINE

## 2023-11-17 PROCEDURE — G0439 PPPS, SUBSEQ VISIT: HCPCS | Performed by: FAMILY MEDICINE

## 2023-11-17 PROCEDURE — 81002 URINALYSIS NONAUTO W/O SCOPE: CPT | Performed by: FAMILY MEDICINE

## 2023-11-17 PROCEDURE — 1160F RVW MEDS BY RX/DR IN RCRD: CPT | Performed by: FAMILY MEDICINE

## 2023-11-17 PROCEDURE — 87086 URINE CULTURE/COLONY COUNT: CPT

## 2023-11-17 PROCEDURE — 81001 URINALYSIS AUTO W/SCOPE: CPT

## 2023-11-17 PROCEDURE — 1170F FXNL STATUS ASSESSED: CPT | Performed by: FAMILY MEDICINE

## 2023-11-17 PROCEDURE — 99214 OFFICE O/P EST MOD 30 MIN: CPT | Performed by: FAMILY MEDICINE

## 2023-11-17 PROCEDURE — 3079F DIAST BP 80-89 MM HG: CPT | Performed by: FAMILY MEDICINE

## 2023-11-17 PROCEDURE — 96372 THER/PROPH/DIAG INJ SC/IM: CPT | Performed by: FAMILY MEDICINE

## 2023-11-17 RX ORDER — CLOPIDOGREL BISULFATE 75 MG/1
75 TABLET ORAL DAILY
Qty: 90 TABLET | Refills: 1 | Status: SHIPPED | OUTPATIENT
Start: 2023-11-17 | End: 2024-05-06 | Stop reason: SDUPTHER

## 2023-11-17 RX ORDER — HYDROCORTISONE 10 MG/1
15 TABLET ORAL EVERY MORNING
Qty: 270 TABLET | Refills: 1 | Status: SHIPPED | OUTPATIENT
Start: 2023-11-17

## 2023-11-17 RX ORDER — CLOPIDOGREL BISULFATE 75 MG/1
75 TABLET ORAL DAILY
Qty: 90 TABLET | Refills: 1 | Status: SHIPPED | OUTPATIENT
Start: 2023-11-17 | End: 2023-11-17 | Stop reason: SDUPTHER

## 2023-11-17 RX ORDER — CIPROFLOXACIN 500 MG/1
500 TABLET ORAL 2 TIMES DAILY
Qty: 14 TABLET | Refills: 1 | Status: SHIPPED | OUTPATIENT
Start: 2023-11-17 | End: 2024-01-10 | Stop reason: WASHOUT

## 2023-11-17 RX ORDER — HYDROCHLOROTHIAZIDE 12.5 MG/1
12.5 TABLET ORAL DAILY
Qty: 90 TABLET | Refills: 1 | Status: SHIPPED | OUTPATIENT
Start: 2023-11-17 | End: 2024-04-05 | Stop reason: SDUPTHER

## 2023-11-17 RX ORDER — METOPROLOL SUCCINATE 25 MG/1
25 TABLET, EXTENDED RELEASE ORAL DAILY
Qty: 90 TABLET | Refills: 1 | Status: SHIPPED | OUTPATIENT
Start: 2023-11-17 | End: 2024-01-16 | Stop reason: SDUPTHER

## 2023-11-17 RX ORDER — CYANOCOBALAMIN 1000 UG/ML
1000 INJECTION, SOLUTION INTRAMUSCULAR; SUBCUTANEOUS ONCE
Status: COMPLETED | OUTPATIENT
Start: 2023-11-17 | End: 2023-11-17

## 2023-11-17 RX ORDER — HYDROCORTISONE 10 MG/1
15 TABLET ORAL EVERY MORNING
Qty: 270 TABLET | Refills: 1 | Status: SHIPPED | OUTPATIENT
Start: 2023-11-17 | End: 2023-11-17 | Stop reason: SDUPTHER

## 2023-11-17 RX ADMIN — CYANOCOBALAMIN 1000 MCG: 1000 INJECTION, SOLUTION INTRAMUSCULAR; SUBCUTANEOUS at 14:44

## 2023-11-17 ASSESSMENT — PATIENT HEALTH QUESTIONNAIRE - PHQ9
1. LITTLE INTEREST OR PLEASURE IN DOING THINGS: NOT AT ALL
2. FEELING DOWN, DEPRESSED OR HOPELESS: NOT AT ALL
SUM OF ALL RESPONSES TO PHQ9 QUESTIONS 1 AND 2: 0

## 2023-11-17 ASSESSMENT — ACTIVITIES OF DAILY LIVING (ADL)
TAKING_MEDICATION: INDEPENDENT
DOING_HOUSEWORK: INDEPENDENT
GROCERY_SHOPPING: INDEPENDENT
DRESSING: INDEPENDENT
MANAGING_FINANCES: INDEPENDENT
BATHING: INDEPENDENT

## 2023-11-17 ASSESSMENT — ENCOUNTER SYMPTOMS: DEPRESSION: 0

## 2023-11-17 NOTE — PROGRESS NOTES
Subjective   Reason for Visit: Daysi Resendiz is an 73 y.o. female here for a Medicare Wellness visit.          Reviewed all medications by prescribing practitioner or clinical pharmacist (such as prescriptions, OTCs, herbal therapies and supplements) and documented in the medical record.    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         Benign essential hypertension    Current Assessment & Plan     Monitoring.  Usually better.  No changes today.         Encounter to discuss test results    Hyperlipidemia    Current Assessment & Plan     Controlled.  Continue present management.         Hypertension    Current Assessment & Plan     See above.         Medicare annual wellness visit, subsequent - Primary    Urinary symptom or sign    Current Assessment & Plan     Antibiotic ordered.         Other Visit Diagnoses     Osteoporosis, unspecified osteoporosis type, unspecified pathological fracture presence        Routine general medical examination at health care facility

## 2023-11-17 NOTE — ASSESSMENT & PLAN NOTE
>>ASSESSMENT AND PLAN FOR BENIGN ESSENTIAL HYPERTENSION WRITTEN ON 11/17/2023  1:58 PM BY LINETTE LUBIN MD    Monitoring.  Usually better.  No changes today.    >>ASSESSMENT AND PLAN FOR HYPERTENSION WRITTEN ON 11/17/2023  1:59 PM BY LINETTE LUBIN MD    See above.

## 2023-11-19 LAB — BACTERIA UR CULT: NORMAL

## 2023-11-20 ENCOUNTER — TELEPHONE (OUTPATIENT)
Dept: SCHEDULING | Age: 73
End: 2023-11-20
Payer: COMMERCIAL

## 2023-11-24 DIAGNOSIS — N39.0 URINARY TRACT INFECTION, SITE NOT SPECIFIED: ICD-10-CM

## 2023-11-24 DIAGNOSIS — I12.9 HYPERTENSIVE CHRONIC KIDNEY DISEASE WITH STAGE 1 THROUGH STAGE 4 CHRONIC KIDNEY DISEASE, OR UNSPECIFIED CHRONIC KIDNEY DISEASE: Primary | ICD-10-CM

## 2023-11-24 DIAGNOSIS — E55.9 VITAMIN D DEFICIENCY, UNSPECIFIED: ICD-10-CM

## 2023-11-24 DIAGNOSIS — N18.31 CHRONIC KIDNEY DISEASE, STAGE 3A (MULTI): ICD-10-CM

## 2023-11-27 RX ORDER — ATORVASTATIN CALCIUM 20 MG/1
20 TABLET, FILM COATED ORAL NIGHTLY
Qty: 90 TABLET | Refills: 2 | OUTPATIENT
Start: 2023-11-27

## 2023-12-01 ENCOUNTER — TELEPHONE (OUTPATIENT)
Dept: CARDIOLOGY | Facility: CLINIC | Age: 73
End: 2023-12-01
Payer: COMMERCIAL

## 2023-12-11 DIAGNOSIS — N18.31 CHRONIC KIDNEY DISEASE, STAGE 3A (MULTI): ICD-10-CM

## 2023-12-11 DIAGNOSIS — I12.9 HYPERTENSIVE CHRONIC KIDNEY DISEASE WITH STAGE 1 THROUGH STAGE 4 CHRONIC KIDNEY DISEASE, OR UNSPECIFIED CHRONIC KIDNEY DISEASE: Primary | ICD-10-CM

## 2023-12-11 DIAGNOSIS — N39.0 URINARY TRACT INFECTION, SITE NOT SPECIFIED: ICD-10-CM

## 2023-12-11 DIAGNOSIS — E55.9 VITAMIN D DEFICIENCY, UNSPECIFIED: ICD-10-CM

## 2023-12-15 ENCOUNTER — APPOINTMENT (OUTPATIENT)
Dept: RADIOLOGY | Facility: HOSPITAL | Age: 73
End: 2023-12-15
Payer: COMMERCIAL

## 2023-12-15 ENCOUNTER — HOSPITAL ENCOUNTER (OUTPATIENT)
Dept: RADIOLOGY | Facility: HOSPITAL | Age: 73
Discharge: HOME | End: 2023-12-15
Payer: COMMERCIAL

## 2023-12-15 DIAGNOSIS — I10 HYPERTENSION, UNSPECIFIED TYPE: ICD-10-CM

## 2023-12-15 DIAGNOSIS — Z12.31 BREAST CANCER SCREENING BY MAMMOGRAM: ICD-10-CM

## 2023-12-15 PROCEDURE — 77063 BREAST TOMOSYNTHESIS BI: CPT

## 2023-12-15 PROCEDURE — 77063 BREAST TOMOSYNTHESIS BI: CPT | Performed by: RADIOLOGY

## 2023-12-15 PROCEDURE — 77067 SCR MAMMO BI INCL CAD: CPT | Performed by: RADIOLOGY

## 2023-12-15 RX ORDER — ATORVASTATIN CALCIUM 20 MG/1
20 TABLET, FILM COATED ORAL NIGHTLY
Qty: 90 TABLET | Refills: 3 | Status: SHIPPED | OUTPATIENT
Start: 2023-12-15 | End: 2024-12-14

## 2024-01-03 ENCOUNTER — OFFICE VISIT (OUTPATIENT)
Dept: VASCULAR SURGERY | Facility: CLINIC | Age: 74
End: 2024-01-03
Payer: COMMERCIAL

## 2024-01-03 DIAGNOSIS — I87.393 CHRONIC VENOUS HYPERTENSION WITH COMPLICATION INVOLVING BOTH SIDES: Primary | ICD-10-CM

## 2024-01-03 PROCEDURE — 1036F TOBACCO NON-USER: CPT | Performed by: INTERNAL MEDICINE

## 2024-01-03 PROCEDURE — 93970 EXTREMITY STUDY: CPT | Performed by: INTERNAL MEDICINE

## 2024-01-03 NOTE — PROGRESS NOTES
Venous Duplex      Indications:  Limb pain  Leg Edema    Sonographer: Windy Goodwin RVT    TECHNIQUE:  Venous Duplex ultrasound spectral Doppler wave modality was performed with the patient in the supine and upright positions to determine the status of the deep and superficial systems of the right and left lower extremity. The common femoral, femoral and popliteal veins of the deep venous system were imaged. The superficial system was imaged entirely to include, but was not limited to, the saphenous-femoral junction (SFJ) down the greater saphenous vein from the groin to the ankle, and the saphenous-popliteal junction (SPJ), if present, at the popliteal fossa down the small saphenous vein (SSV) to the ankle. As indicated, the cranial extensions of the SSV (CESSV), the anterior accessory GSV (AAGSV), and the posterior accessory GSV (PAGSV) were also evaluated, if present. All areas meeting the criteria for venous reflux (500 milliseconds or greater in the saphenous veins and principle branches, 350 milliseconds in perforators and 1000 milliseconds in the deep system) were confirmed with spectral Doppler analysis and confirmatory images were documented:     FINDINGS ARE THE FOLLOWING:    RIGHT LEG:  There is no evidence of thrombus in the interrogated segments of the deep or superficial venous system. There is no evidence of deep venous reflux.     GSV dimensions:  junction is not visualized   Proximal segment is not visualized   Mid segment is not visualized  Distal segment is not visualized  The Greater Saphenous Vein appears to be absent, consistent with previous stripping, per the patient.     SSV dimensions: 2.9mm junction  1.7mm mid  There is <0.5 seconds of reflux in the Small Saphenous Vein    There are multiple incompetent tributaries along the thigh and lower leg.    Trib1 dimensions: 3.4mm  Trib2 dimensions: 3.3mm  Trib3 dimenisons: 3.3mm  There is >0.5 seconds of reflux in the tributaries       LEFT  LEG:  There is no evidence of thrombus in the interrogated segments of the deep or superficial venous system. There is no evidence of venous reflux.     GSV dimensions:  junction is not visualized   Proximal segment is not visualized   Mid segment is not visualized  Distal segment is not visualized  The Greater Saphenous Vein appears to be absent, consistent with previous stripping, per the patient.     SSV dimensions: 2.9mm junction  3.1mm mid  There is <0.5 seconds of reflux in the Small Saphenous Vein    There are multiple incompetent tributaries along the thigh and lower leg.    Trib1 dimensions: 3.5mm  Trib2 dimensions: 3.6mm  Trib3 dimensions: 3.6mm  There is >0.5 seconds of reflux in the tributaries      CONCLUSIONS:  There is no evidence of thrombus in the interrogated segments of the deep or superficial venous system in both legs.  No evidence of deep venous reflux.  Patient's bilateral leg great saphenous veins absent probably from previous procedures.  Numerous incompetent tributaries are seen in both legs as noted in the MAP measured more than 3 mm in diameter.    Incidental finding of bilateral groin oval shape hypoechogenic structure with central echogenic area measured right leg 7.2 x 33.5 mm and left leg 7.3 x 14.1 mm in diameter, possibly lymph nodes identified. Clinical correlation advised.    Discussions    As per the detailed reflux study of the legs patient may benefit from adjunctive Ultrasound guided foam sclerotherapy X 2  in each leg.

## 2024-01-10 ENCOUNTER — OFFICE VISIT (OUTPATIENT)
Dept: PRIMARY CARE | Facility: CLINIC | Age: 74
End: 2024-01-10
Payer: COMMERCIAL

## 2024-01-10 VITALS
SYSTOLIC BLOOD PRESSURE: 154 MMHG | BODY MASS INDEX: 30.83 KG/M2 | DIASTOLIC BLOOD PRESSURE: 86 MMHG | HEART RATE: 70 BPM | WEIGHT: 196.4 LBS | HEIGHT: 67 IN

## 2024-01-10 DIAGNOSIS — I87.393 CHRONIC VENOUS HYPERTENSION WITH COMPLICATION INVOLVING BOTH SIDES: ICD-10-CM

## 2024-01-10 DIAGNOSIS — I83.893 VARICOSE VEINS OF BOTH LEGS WITH EDEMA: Primary | ICD-10-CM

## 2024-01-10 DIAGNOSIS — E89.6: ICD-10-CM

## 2024-01-10 DIAGNOSIS — I87.8 VENOUS STASIS OF BOTH LOWER EXTREMITIES: ICD-10-CM

## 2024-01-10 DIAGNOSIS — I83.819 VARICOSE VEINS WITH PAIN: ICD-10-CM

## 2024-01-10 PROBLEM — Z20.822 EXPOSURE TO COVID-19 VIRUS: Status: ACTIVE | Noted: 2024-01-10

## 2024-01-10 PROBLEM — Z86.59 HISTORY OF CLAUSTROPHOBIA: Status: ACTIVE | Noted: 2024-01-10

## 2024-01-10 PROBLEM — R68.83 CHILLS: Status: ACTIVE | Noted: 2024-01-10

## 2024-01-10 PROBLEM — R42 DIZZINESS: Status: ACTIVE | Noted: 2024-01-10

## 2024-01-10 PROBLEM — N28.1 RENAL CYSTS, ACQUIRED, BILATERAL: Status: ACTIVE | Noted: 2023-01-17

## 2024-01-10 PROBLEM — M53.3 COCCYX PAIN: Status: ACTIVE | Noted: 2024-01-10

## 2024-01-10 PROBLEM — E27.40 ADRENAL INSUFFICIENCY (MULTI): Status: ACTIVE | Noted: 2023-12-15

## 2024-01-10 PROBLEM — E27.8 LEFT ADRENAL MASS (MULTI): Status: ACTIVE | Noted: 2023-01-17

## 2024-01-10 PROBLEM — R35.0 URINARY FREQUENCY: Status: ACTIVE | Noted: 2024-01-10

## 2024-01-10 PROBLEM — U07.1 COVID-19 VIRUS INFECTION: Status: ACTIVE | Noted: 2024-01-10

## 2024-01-10 PROCEDURE — 1036F TOBACCO NON-USER: CPT | Performed by: INTERNAL MEDICINE

## 2024-01-10 PROCEDURE — 3077F SYST BP >= 140 MM HG: CPT | Performed by: INTERNAL MEDICINE

## 2024-01-10 PROCEDURE — 1160F RVW MEDS BY RX/DR IN RCRD: CPT | Performed by: INTERNAL MEDICINE

## 2024-01-10 PROCEDURE — 1159F MED LIST DOCD IN RCRD: CPT | Performed by: INTERNAL MEDICINE

## 2024-01-10 PROCEDURE — 99214 OFFICE O/P EST MOD 30 MIN: CPT | Performed by: INTERNAL MEDICINE

## 2024-01-10 PROCEDURE — 3079F DIAST BP 80-89 MM HG: CPT | Performed by: INTERNAL MEDICINE

## 2024-01-10 RX ORDER — NEEDLES, SAFETY 22GX1 1/2"
NEEDLE, DISPOSABLE MISCELLANEOUS
COMMUNITY
Start: 2023-12-13

## 2024-01-10 RX ORDER — OXYBUTYNIN CHLORIDE 5 MG/1
1 TABLET, EXTENDED RELEASE ORAL DAILY
COMMUNITY
Start: 2023-12-15 | End: 2024-01-10 | Stop reason: ALTCHOICE

## 2024-01-10 RX ORDER — HYDROCORTISONE SODIUM SUCCINATE 100 MG/2ML
INJECTION, POWDER, FOR SOLUTION INTRAMUSCULAR; INTRAVENOUS AS NEEDED
COMMUNITY
Start: 2023-12-13

## 2024-01-10 NOTE — PROGRESS NOTES
Chief Complaints:  Seen for follow-up after detailed ultrasound evaluation.    HPI:    73 years old female who is generally active came for a follow-up visit accompanied by her  after having a detailed ultrasound evaluation.  Patient's daughter has had procedures with us and doing excellent.      Bilateral Leg Symptoms:  Current symptoms include Leg pain, swelling, cramps.  The severity is moderate.  Aggravating factors include prolonged sitting/standing, walking.  Alleviating factors include leg elevation.  Activities of Daily Living The patient's symptoms are worse later in the day,     Patient is compliant with wearing the graduated compression stockings.            Symptoms:  bulging veins,~dilated veins,~discolored veins,~leg pain,~leg swelling~and~muscle cramps. The patient is currently experiencing symptoms. Symptoms are located on both sides, the left side more than the right. The patient describes the pain as aching, heavy and stinging. Onset was gradual 20 year(s) ago. The symptoms occur intermittently. The episodes occur daily. She describes this as moderate in severity~and~worsening. Exacerbating factors:  leg dependency,~prolonged sitting~and~prolonged standing. Relieving factors:  elevation,~exercises~and~support stockings. Current treatment includes compression stockings and leg elevation. By report, there is fair compliance with treatment, fair tolerance of treatment and poor symptom control. Initial diagnosis of varicose veins was More than 30 year(s) ago. Initial presentation included bulging veins and dilated veins. Since diagnosis the disease has been worsening. Recently, the disease has been worsening. Disease complications:  chronic edema~and~hyperpigmentation. Pertinent medical history:  no deep venous thrombosis,~no hypercoagulable state,~no pulmonary embolism~and~no thrombophlebitis. Risk factors:  family history of varicose veins~and~obesity. She was previously evaluated by a  "colleague 20 year(s) ago. Presenting symptoms included bulging veins, dilated veins, leg pain and muscle cramps. Past treatment has included compression stockings, leg elevation, surgical excision and vein stripping.     ROS:  Respiratory:  No shortness of breath.  No cough, sinus congestion    Cardiovascular:    No chest pain.  Denies claudication.  No cyanosis.  Denies palpitations,    Neurologic:    No tingling/Numbness.  No loss of strength.    Social History:  Tobacco Use:  None    Current Outpatient Medications on File Prior to Visit   Medication Sig Dispense Refill    atorvastatin (Lipitor) 20 mg tablet Take 1 tablet (20 mg) by mouth once daily at bedtime. 90 tablet 3    BD SafetyGlide Needle 25 gauge x 1\" needle USE WITH SOLUCORTEF FOR ADRENAL CRISIS.      clopidogrel (Plavix) 75 mg tablet Take 1 tablet (75 mg) by mouth once daily. 90 tablet 1    hydroCHLOROthiazide (HYDRODiuril) 12.5 mg tablet Take 1 tablet (12.5 mg) by mouth once daily. 90 tablet 1    hydrocortisone (Cortef) 10 mg tablet Take 1.5 tablets (15 mg) by mouth once daily in the morning.  TAKE ONE AND A HALF TABLETS BY MOUTH IN THE MORNING THEN TAKE HALF A TABLET AT 3PM THEN TAKE HALF A TABLET AT BEDTIME (Patient taking differently: Take 1.5 tablets (15 mg) by mouth once daily in the morning.  TAKE ONE AND A HALF TABLETS BY MOUTH IN THE MORNING THEN TAKE HALF A TABLET AT 3PM) 270 tablet 1    metoprolol succinate XL (Toprol-XL) 25 mg 24 hr tablet Take 1 tablet (25 mg) by mouth once daily. 90 tablet 1    Solu-CORTEF Act-O-Vial, PF, 100 mg/2 mL injection INJECT 100 MG( 2 ML) IM FOR ADRENAL CRISIS.      [DISCONTINUED] oxybutynin XL (Ditropan-XL) 5 mg 24 hr tablet Take 1 tablet (5 mg) by mouth once daily.      [DISCONTINUED] ciprofloxacin (Cipro) 500 mg tablet Take 1 tablet (500 mg) by mouth 2 times a day. 14 tablet 1    [DISCONTINUED] estradiol (Estrace) 0.01 % (0.1 mg/gram) vaginal cream Insert 1 Applicatorful into the vagina once daily.       No " "current facility-administered medications on file prior to visit.        Allergies   Allergen Reactions    Bee Venom Protein (Honey Bee) Anaphylaxis    Amoxicillin Itching    Aspirin Hives and Unknown    Influenza Virus Vaccine, Live Attenuated Other    Influenza Virus Vaccines Unknown    Labetalol Unknown    Losartan-Hydrochlorothiazide Unknown    Methylprednisolone Hives and Unknown    Metoclopramide Unknown     raised B/P    Midazolam Other and Unknown     had stroke    Pneumoc 13-Yanet Conj-Dip Cr(Pf) Unknown    Pneumococcal 23-Yanet Ps Vaccine Unknown    Pneumococcal 23-Valent Polysaccharide Vaccine Unknown    Sulfa (Sulfonamide Antibiotics) Unknown        Examination:    Visit Vitals  /86   Pulse 70   Ht 1.702 m (5' 7\")   Wt 89.1 kg (196 lb 6.4 oz)   BMI 30.76 kg/m²   Smoking Status Former   BSA 2.05 m²        Normal-built, well-nourished  with no apparent distress. Alert oriented  Skin:  Normal turgor.  No rash.  Head:  Normocephalic, atraumatic.  Eyes:  Pupils are equal, round,.  No pallor of conjunctivae.  Mouth has moist oral mucosa.  Neck:  Supple.  No JVD.  No clubbing, has peripheral osteoarthritis present  There is puffiness of joints left lateral ankle, left knee mostly on the lateral side noted.  Chest:  Vesicular breathing Bilaterally good air entry.  No wheezing.  No crackles.  Heart:  Regular rate and rhythm.  S1, S2 positive.  No murmur.  Extremities:  Bilaterally has chronic venous stasis present.  Bilaterally 2+ dorsalis pedis pulses.  No calf tenderness. Homans sign is negative.  Neuro Exam: No focal signs. Gait is normal.     Venous Exam:  Varicose veins in left calf [ present.  Varicose veins in left thigh [ present.  Varicose veins in right calf [ present.  Varicose veins in right thigh [ present.  Reticular veins in left calf [ present.  Reticular veins in left thigh [ present.  Reticular veins in right calf [ present.  Reticular veins in right thigh [ present.  Telangiectasias in left " calf [ present.  Telangiectasias in left thigh [ present.  Telangiectasias in right calf [ present.  Telangiectasias in right thigh [ present.  Right leg trace edema [ present.  Left leg 1+ edema [ present.  Hyperpigmentation in left leg [ present.  Hyperpigmentation in right leg [ present.  Lipodermatosclerosis in right leg absent ].  Lipodermatosclerosis in left leg [ absent ].  Dermatitis in left leg [ present.  Dermatitis in right leg [ present.  Corona phlebectatica in left leg [ present.  Corona phlebectatica in right leg [ present.  Atrophe annie in left leg absent ].  Atrophe annie in right leg  absent ].  Ulcer(s) in left leg  absent ].  Ulcer(s) in right leg  absent ].     Right leg CEAP [id number]     Left leg CEAP [id number]      Diagnosis/ Problems    Assessment:    Problem List Items Addressed This Visit       Varicose veins of leg with swelling - Primary    Varicose veins with pain    Venous stasis of both lower extremities    Chronic venous hypertension with complication involving both sides    Post-adrenalectomy adrenal insufficiency (CMS/HCC)          Plan     Chronic venous insufficiency of bilateral lower extremities with other complications  adjunctive Ultrasound guided noncompounded foam sclerotherapy of refluxing remnant of truncal vein X 1 each leg.  Patient also may need adjunctive image guided foam sclerotherapy X 1  in each leg    Discussions   Patient's detail ultrasound evaluation including the illustration of the refluxing superficial venous system of both legs were reviewed in detail with the patient.  Patient has tributaries measured more than 3 mm in diameter reflux identified giving rise to the symptoms.  Because of the progressive nature of venous disease and patient's continued symptoms in spite of doing the conservative management including lifestyle modification and wearing the graduated compression stockings further options were discussed with the patient. ultrasound-guided foam  sclerotherapy of large refluxing veins was discussed. Risks, benefits, goals and alternatives and reasonable expectations were discussed for at least 20 min. All risks were discussed, including, but not limited to hyperpigmentation, skin necrosis, recurrence/progression of the disease/symptoms, infection, DVT, SVT and all other possible complications. All questions were answered to patient satisfaction. Patients understands and wishes to proceed. Handouts were given to the patient regarding the procedures and also the explanation including the measures taken to prevent the possible complications  which includes wearing the graduated compression stockings immediately after procedure and overnight that day and also walking every hour about 10 minutes during her waking time on the day of the procedure.  She will continue to wear the graduated compression stockings during the daytime for minimum 2 weeks and also she will be active including no traveling or prolonged nonambulatory status for 2 weeks to prevent DVT and other complications.    Patient has sulfa allergy.  I explained that in STS there is sulfate not sulfa.  I also explained to the patient that we cannot do allergy testing because of the STS being retained and cause reaction under the skin.  After detailed discussion it was decided that we will get approval for Varithena and if Varithena is not approved we need to try and purchase polidocanol to do a total of 4 procedures.  At this time polidocanol is not easily available.  Patient has been understands.        Counseling.  The patient was counseled regarding instructions for management, risk factor reductions, prognosis, patient and family education, impressions, risks and benefits of treatment options and importance of compliance with treatment. Total time of encounter was 36 minutes and 27 minutes was spent counseling.

## 2024-01-16 ENCOUNTER — OFFICE VISIT (OUTPATIENT)
Dept: PRIMARY CARE | Facility: CLINIC | Age: 74
End: 2024-01-16
Payer: COMMERCIAL

## 2024-01-16 VITALS
HEART RATE: 76 BPM | WEIGHT: 191 LBS | DIASTOLIC BLOOD PRESSURE: 82 MMHG | TEMPERATURE: 97.6 F | HEIGHT: 67 IN | SYSTOLIC BLOOD PRESSURE: 130 MMHG | BODY MASS INDEX: 29.98 KG/M2

## 2024-01-16 DIAGNOSIS — R05.8 DRY COUGH: ICD-10-CM

## 2024-01-16 DIAGNOSIS — I10 BENIGN ESSENTIAL HYPERTENSION: ICD-10-CM

## 2024-01-16 DIAGNOSIS — I10 PRIMARY HYPERTENSION: Primary | ICD-10-CM

## 2024-01-16 PROCEDURE — 1159F MED LIST DOCD IN RCRD: CPT | Performed by: FAMILY MEDICINE

## 2024-01-16 PROCEDURE — 3075F SYST BP GE 130 - 139MM HG: CPT | Performed by: FAMILY MEDICINE

## 2024-01-16 PROCEDURE — 1160F RVW MEDS BY RX/DR IN RCRD: CPT | Performed by: FAMILY MEDICINE

## 2024-01-16 PROCEDURE — 3079F DIAST BP 80-89 MM HG: CPT | Performed by: FAMILY MEDICINE

## 2024-01-16 PROCEDURE — 99213 OFFICE O/P EST LOW 20 MIN: CPT | Performed by: FAMILY MEDICINE

## 2024-01-16 PROCEDURE — 1036F TOBACCO NON-USER: CPT | Performed by: FAMILY MEDICINE

## 2024-01-16 RX ORDER — METOPROLOL SUCCINATE 25 MG/1
50 TABLET, EXTENDED RELEASE ORAL DAILY
Qty: 90 TABLET | Refills: 0 | Status: SHIPPED | OUTPATIENT
Start: 2024-01-16 | End: 2024-02-27

## 2024-01-16 ASSESSMENT — ENCOUNTER SYMPTOMS
HEADACHES: 1
HYPERTENSION: 1

## 2024-01-16 NOTE — PROGRESS NOTES
"Subjective   Chief complaint: Daysi Resendiz is a 73 y.o. female who presents for Hypertension (Patient has noticed that over the past month or so her blood pressures have been elevated. She saw Dr. Kobe West for her veins last week and her blood pressure was 154/86. She also took her blood pressure at home and it was 170/80. ) and Headache (Patient also complaining of increasing headache pain since her blood pressures have been elevated.).    HPI:  Here with a complaint of elevated blood pressures.  It has been up in the 130s and usually with Raghu's it is lower than 120 systollically.  Yesterday she had associated right eye pain and pressure around her head.  Had history of stroke and she would like to keep her pressures lower.  At home last evening her pressures were 165/74 and 139/73.  She sees Dr. Dick yearly for kidney function.   Has also been having a dry cough but no other respiratory symptoms.  Has had for a couple months.        Hypertension  Associated symptoms include headaches.   Headache     Objective   /82 (BP Location: Left arm, Patient Position: Sitting, BP Cuff Size: Adult)   Pulse 76   Temp 36.4 °C (97.6 °F) (Temporal)   Ht 1.702 m (5' 7\")   Wt 86.6 kg (191 lb)   BMI 29.91 kg/m²     Physical Exam  General:  Alert, oriented, no acute distress  Eyes:  Sclerae white, PER, conjunctivae clear  ENT:  No nasal congestion.    Neck: Supple   Respiratory:  Normal breath sounds.  No wheezing, rhonchi nor crackles.  No dyspnea.  Cardiovascular:  S1 and S2 positive.  Regular rate and rhythm.  No gallops.  No murmurs.  Vascular:  No edema.  Skin warm and dry.  CNS:  No gross neurological deficits.  Gait within normal limits.    Psychiatric:  Affect is positive and appropriate.  No depression.  No anxiety.    Review of Systems   Neurological:  Positive for headaches.      I have reviewed and reconciled the medication list with the patient today.   Current Outpatient Medications:     atorvastatin " "(Lipitor) 20 mg tablet, Take 1 tablet (20 mg) by mouth once daily at bedtime., Disp: 90 tablet, Rfl: 3    BD SafetyGlide Needle 25 gauge x 1\" needle, USE WITH SOLUCORTEF FOR ADRENAL CRISIS., Disp: , Rfl:     clopidogrel (Plavix) 75 mg tablet, Take 1 tablet (75 mg) by mouth once daily., Disp: 90 tablet, Rfl: 1    hydroCHLOROthiazide (HYDRODiuril) 12.5 mg tablet, Take 1 tablet (12.5 mg) by mouth once daily., Disp: 90 tablet, Rfl: 1    hydrocortisone (Cortef) 10 mg tablet, Take 1.5 tablets (15 mg) by mouth once daily in the morning.  TAKE ONE AND A HALF TABLETS BY MOUTH IN THE MORNING THEN TAKE HALF A TABLET AT 3PM THEN TAKE HALF A TABLET AT BEDTIME (Patient taking differently: Take 1.5 tablets (15 mg) by mouth once daily in the morning.  TAKE ONE AND A HALF TABLETS BY MOUTH IN THE MORNING THEN TAKE HALF A TABLET AT 3PM), Disp: 270 tablet, Rfl: 1    Solu-CORTEF Act-O-Vial, PF, 100 mg/2 mL injection, INJECT 100 MG( 2 ML) IM FOR ADRENAL CRISIS., Disp: , Rfl:     metoprolol succinate XL (Toprol-XL) 25 mg 24 hr tablet, Take 2 tablets (50 mg) by mouth once daily., Disp: 90 tablet, Rfl: 0     Imaging:  No results found.     Labs reviewed:    Lab Results   Component Value Date    WBC 9.0 09/18/2023    HGB 13.5 09/18/2023    HCT 40.8 09/18/2023     09/18/2023    CHOL 153 05/10/2022    TRIG 113 05/10/2022    HDL 49.0 05/10/2022    ALT 8 10/23/2023    AST 14 10/23/2023     10/23/2023    K 4.2 10/23/2023     10/23/2023    CREATININE 1.18 (H) 10/23/2023    BUN 23 10/23/2023    CO2 30 10/23/2023    TSH 2.06 10/23/2023    INR 1.0 06/24/2020    HGBA1C 5.5 06/08/2021       Assessment/Plan   Problem List Items Addressed This Visit       Benign essential hypertension     Adjusting medication doses.  Follow up to reevaluate pressures.           Relevant Medications    metoprolol succinate XL (Toprol-XL) 25 mg 24 hr tablet    Dry cough     Monitoring right now.         Hypertension - Primary       Continue other " medications as listed  Follow up in 2 weeks.  Bring home record and home BP cuff in with her.

## 2024-01-22 ENCOUNTER — APPOINTMENT (OUTPATIENT)
Dept: RADIOLOGY | Facility: HOSPITAL | Age: 74
End: 2024-01-22
Payer: COMMERCIAL

## 2024-01-30 ENCOUNTER — OFFICE VISIT (OUTPATIENT)
Dept: PRIMARY CARE | Facility: CLINIC | Age: 74
End: 2024-01-30
Payer: COMMERCIAL

## 2024-01-30 VITALS
SYSTOLIC BLOOD PRESSURE: 132 MMHG | DIASTOLIC BLOOD PRESSURE: 60 MMHG | TEMPERATURE: 98.1 F | BODY MASS INDEX: 31.08 KG/M2 | WEIGHT: 198 LBS | HEART RATE: 72 BPM | HEIGHT: 67 IN

## 2024-01-30 DIAGNOSIS — R39.9 URINARY SYMPTOM OR SIGN: Primary | ICD-10-CM

## 2024-01-30 DIAGNOSIS — K57.90 DIVERTICULOSIS: ICD-10-CM

## 2024-01-30 DIAGNOSIS — R10.32 LLQ ABDOMINAL PAIN: ICD-10-CM

## 2024-01-30 DIAGNOSIS — R35.0 URINARY FREQUENCY: ICD-10-CM

## 2024-01-30 DIAGNOSIS — N31.8 FREQUENCY-URGENCY SYNDROME: ICD-10-CM

## 2024-01-30 DIAGNOSIS — N30.90 RECURRENT CYSTITIS: ICD-10-CM

## 2024-01-30 LAB
POC APPEARANCE, URINE: CLEAR
POC BILIRUBIN, URINE: NEGATIVE
POC BLOOD, URINE: NEGATIVE
POC COLOR, URINE: ABNORMAL
POC GLUCOSE, URINE: NEGATIVE MG/DL
POC KETONES, URINE: NEGATIVE MG/DL
POC LEUKOCYTES, URINE: ABNORMAL
POC NITRITE,URINE: NEGATIVE
POC PH, URINE: 6 PH
POC PROTEIN, URINE: ABNORMAL MG/DL
POC SPECIFIC GRAVITY, URINE: <=1.005
POC UROBILINOGEN, URINE: 0.2 EU/DL

## 2024-01-30 PROCEDURE — 81002 URINALYSIS NONAUTO W/O SCOPE: CPT | Performed by: STUDENT IN AN ORGANIZED HEALTH CARE EDUCATION/TRAINING PROGRAM

## 2024-01-30 PROCEDURE — 1157F ADVNC CARE PLAN IN RCRD: CPT | Performed by: STUDENT IN AN ORGANIZED HEALTH CARE EDUCATION/TRAINING PROGRAM

## 2024-01-30 PROCEDURE — 3078F DIAST BP <80 MM HG: CPT | Performed by: STUDENT IN AN ORGANIZED HEALTH CARE EDUCATION/TRAINING PROGRAM

## 2024-01-30 PROCEDURE — 1160F RVW MEDS BY RX/DR IN RCRD: CPT | Performed by: STUDENT IN AN ORGANIZED HEALTH CARE EDUCATION/TRAINING PROGRAM

## 2024-01-30 PROCEDURE — 87086 URINE CULTURE/COLONY COUNT: CPT

## 2024-01-30 PROCEDURE — 1159F MED LIST DOCD IN RCRD: CPT | Performed by: STUDENT IN AN ORGANIZED HEALTH CARE EDUCATION/TRAINING PROGRAM

## 2024-01-30 PROCEDURE — 99214 OFFICE O/P EST MOD 30 MIN: CPT | Performed by: STUDENT IN AN ORGANIZED HEALTH CARE EDUCATION/TRAINING PROGRAM

## 2024-01-30 PROCEDURE — 3075F SYST BP GE 130 - 139MM HG: CPT | Performed by: STUDENT IN AN ORGANIZED HEALTH CARE EDUCATION/TRAINING PROGRAM

## 2024-01-30 PROCEDURE — 1036F TOBACCO NON-USER: CPT | Performed by: STUDENT IN AN ORGANIZED HEALTH CARE EDUCATION/TRAINING PROGRAM

## 2024-01-30 NOTE — PROGRESS NOTES
"Subjective   Patient ID: Daysi Resendiz is a 73 y.o. female who presents for Hypertension (Patient is here in office today to follow up on blood pressure. During her last OV Apple Tello MD increase her metoprolol to 50 mg daily) and UTI (Patient is also concerned with possible UTI, she states she has chronic UTI but for the past few days she has been experiencing left lower quadrant discomfort that bands to her back. ).    HTN follow up  Concern for UTI  LLQ abd pain  Had watery diarrhea yesterday  Denies fever and vomiting  Does have nausea  BP 130s-150s systolic at home  Has generalized headaches    Plan  UA and urine culture  Continue medication as prescribed for HTN and defer to endocrine about any additional meds for HTN because of Raghu's  Cbc and cmp  Consider stat imaging if symptoms worsen  Referral to urology and gastroenterology  She is going to call her endo  Follow up with results    HPI     Review of Systems    Objective   /60 (BP Location: Left arm, Patient Position: Sitting, BP Cuff Size: Adult)   Pulse 72   Temp 36.7 °C (98.1 °F) (Temporal)   Ht 1.702 m (5' 7\")   Wt 89.8 kg (198 lb)   BMI 31.01 kg/m²     Physical Exam  Vitals reviewed.   Constitutional:       General: She is not in acute distress.     Appearance: Normal appearance. She is not ill-appearing or toxic-appearing.   HENT:      Head: Atraumatic.      Mouth/Throat:      Mouth: Mucous membranes are moist.      Pharynx: Oropharynx is clear.   Eyes:      Extraocular Movements: Extraocular movements intact.      Conjunctiva/sclera: Conjunctivae normal.      Pupils: Pupils are equal, round, and reactive to light.   Cardiovascular:      Rate and Rhythm: Normal rate and regular rhythm.      Pulses: Normal pulses.      Heart sounds: Normal heart sounds. No murmur heard.  Pulmonary:      Effort: Pulmonary effort is normal. No respiratory distress.      Breath sounds: Normal breath sounds.   Abdominal:      General: " Abdomen is flat.      Palpations: Abdomen is soft.      Tenderness: There is abdominal tenderness in the right upper quadrant, left upper quadrant and left lower quadrant. There is left CVA tenderness. There is no right CVA tenderness.   Musculoskeletal:      Right lower leg: No edema.      Left lower leg: No edema.   Skin:     General: Skin is warm and dry.      Capillary Refill: Capillary refill takes less than 2 seconds.      Findings: No rash.   Neurological:      General: No focal deficit present.      Mental Status: She is alert.      Cranial Nerves: No cranial nerve deficit.      Gait: Gait normal.   Psychiatric:         Mood and Affect: Mood normal.         Behavior: Behavior normal.         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

## 2024-01-31 LAB — BACTERIA UR CULT: NO GROWTH

## 2024-02-05 ENCOUNTER — TELEPHONE (OUTPATIENT)
Dept: CARDIOLOGY | Facility: CLINIC | Age: 74
End: 2024-02-05
Payer: COMMERCIAL

## 2024-02-07 DIAGNOSIS — E27.40 UNSPECIFIED ADRENOCORTICAL INSUFFICIENCY (MULTI): Primary | ICD-10-CM

## 2024-02-08 ENCOUNTER — LAB (OUTPATIENT)
Dept: LAB | Facility: LAB | Age: 74
End: 2024-02-08
Payer: COMMERCIAL

## 2024-02-08 DIAGNOSIS — R10.32 LLQ ABDOMINAL PAIN: ICD-10-CM

## 2024-02-08 DIAGNOSIS — E27.40 UNSPECIFIED ADRENOCORTICAL INSUFFICIENCY (MULTI): ICD-10-CM

## 2024-02-08 LAB
ALBUMIN SERPL BCP-MCNC: 4.2 G/DL (ref 3.4–5)
ALP SERPL-CCNC: 70 U/L (ref 33–136)
ALT SERPL W P-5'-P-CCNC: 11 U/L (ref 7–45)
ANION GAP SERPL CALC-SCNC: 12 MMOL/L (ref 10–20)
AST SERPL W P-5'-P-CCNC: 14 U/L (ref 9–39)
BASOPHILS # BLD AUTO: 0.09 X10*3/UL (ref 0–0.1)
BASOPHILS NFR BLD AUTO: 1 %
BILIRUB SERPL-MCNC: 0.6 MG/DL (ref 0–1.2)
BUN SERPL-MCNC: 21 MG/DL (ref 6–23)
CALCIUM SERPL-MCNC: 10.1 MG/DL (ref 8.6–10.3)
CHLORIDE SERPL-SCNC: 102 MMOL/L (ref 98–107)
CO2 SERPL-SCNC: 30 MMOL/L (ref 21–32)
CORTIS SERPL-MCNC: 6.8 UG/DL (ref 2.5–20)
CREAT SERPL-MCNC: 1.28 MG/DL (ref 0.5–1.05)
EGFRCR SERPLBLD CKD-EPI 2021: 44 ML/MIN/1.73M*2
EOSINOPHIL # BLD AUTO: 0.23 X10*3/UL (ref 0–0.4)
EOSINOPHIL NFR BLD AUTO: 2.5 %
ERYTHROCYTE [DISTWIDTH] IN BLOOD BY AUTOMATED COUNT: 13.4 % (ref 11.5–14.5)
GLUCOSE SERPL-MCNC: 104 MG/DL (ref 74–99)
HCT VFR BLD AUTO: 40.9 % (ref 36–46)
HGB BLD-MCNC: 13.6 G/DL (ref 12–16)
IMM GRANULOCYTES # BLD AUTO: 0.03 X10*3/UL (ref 0–0.5)
IMM GRANULOCYTES NFR BLD AUTO: 0.3 % (ref 0–0.9)
LYMPHOCYTES # BLD AUTO: 3.18 X10*3/UL (ref 0.8–3)
LYMPHOCYTES NFR BLD AUTO: 34.2 %
MCH RBC QN AUTO: 30 PG (ref 26–34)
MCHC RBC AUTO-ENTMCNC: 33.3 G/DL (ref 32–36)
MCV RBC AUTO: 90 FL (ref 80–100)
MONOCYTES # BLD AUTO: 0.68 X10*3/UL (ref 0.05–0.8)
MONOCYTES NFR BLD AUTO: 7.3 %
NEUTROPHILS # BLD AUTO: 5.09 X10*3/UL (ref 1.6–5.5)
NEUTROPHILS NFR BLD AUTO: 54.7 %
NRBC BLD-RTO: 0 /100 WBCS (ref 0–0)
PLATELET # BLD AUTO: 274 X10*3/UL (ref 150–450)
POTASSIUM SERPL-SCNC: 4.2 MMOL/L (ref 3.5–5.3)
PROT SERPL-MCNC: 6.6 G/DL (ref 6.4–8.2)
RBC # BLD AUTO: 4.53 X10*6/UL (ref 4–5.2)
SODIUM SERPL-SCNC: 140 MMOL/L (ref 136–145)
T4 FREE SERPL-MCNC: 1 NG/DL (ref 0.61–1.12)
THYROPEROXIDASE AB SERPL-ACNC: <28 IU/ML
TSH SERPL-ACNC: 2.9 MIU/L (ref 0.44–3.98)
WBC # BLD AUTO: 9.3 X10*3/UL (ref 4.4–11.3)

## 2024-02-08 PROCEDURE — 36415 COLL VENOUS BLD VENIPUNCTURE: CPT

## 2024-02-08 PROCEDURE — 82533 TOTAL CORTISOL: CPT

## 2024-02-08 PROCEDURE — 84443 ASSAY THYROID STIM HORMONE: CPT

## 2024-02-08 PROCEDURE — 82024 ASSAY OF ACTH: CPT

## 2024-02-08 PROCEDURE — 85025 COMPLETE CBC W/AUTO DIFF WBC: CPT

## 2024-02-08 PROCEDURE — 80053 COMPREHEN METABOLIC PANEL: CPT

## 2024-02-08 PROCEDURE — 86376 MICROSOMAL ANTIBODY EACH: CPT

## 2024-02-08 PROCEDURE — 84439 ASSAY OF FREE THYROXINE: CPT

## 2024-02-09 LAB — ACTH PLAS-MCNC: 89.9 PG/ML (ref 7.2–63.3)

## 2024-02-27 ENCOUNTER — TELEPHONE (OUTPATIENT)
Dept: CARDIOLOGY | Facility: CLINIC | Age: 74
End: 2024-02-27
Payer: COMMERCIAL

## 2024-02-27 DIAGNOSIS — I10 BENIGN ESSENTIAL HYPERTENSION: ICD-10-CM

## 2024-02-27 RX ORDER — METOPROLOL SUCCINATE 25 MG/1
50 TABLET, EXTENDED RELEASE ORAL DAILY
Qty: 90 TABLET | Refills: 0 | Status: SHIPPED | OUTPATIENT
Start: 2024-02-27 | End: 2024-04-05 | Stop reason: SDUPTHER

## 2024-02-27 NOTE — TELEPHONE ENCOUNTER
2/27/24-VEIN CODE 36465 x 2, APPROVED PER [employer] AUTH# [id number] VALID 3/13--6/13/24. CODE 70349 DOES  NOT REQUIRED PRECERT PER [employer].

## 2024-02-28 ENCOUNTER — OFFICE VISIT (OUTPATIENT)
Dept: GASTROENTEROLOGY | Facility: CLINIC | Age: 74
End: 2024-02-28
Payer: COMMERCIAL

## 2024-02-28 VITALS
RESPIRATION RATE: 16 BRPM | WEIGHT: 198 LBS | HEART RATE: 71 BPM | DIASTOLIC BLOOD PRESSURE: 82 MMHG | BODY MASS INDEX: 31.08 KG/M2 | OXYGEN SATURATION: 98 % | SYSTOLIC BLOOD PRESSURE: 137 MMHG | HEIGHT: 67 IN

## 2024-02-28 DIAGNOSIS — K57.90 DIVERTICULOSIS: ICD-10-CM

## 2024-02-28 DIAGNOSIS — Z86.010 HISTORY OF COLON POLYPS: Primary | ICD-10-CM

## 2024-02-28 DIAGNOSIS — R10.32 LEFT LOWER QUADRANT ABDOMINAL PAIN: ICD-10-CM

## 2024-02-28 PROCEDURE — 1159F MED LIST DOCD IN RCRD: CPT | Performed by: STUDENT IN AN ORGANIZED HEALTH CARE EDUCATION/TRAINING PROGRAM

## 2024-02-28 PROCEDURE — 99214 OFFICE O/P EST MOD 30 MIN: CPT | Performed by: STUDENT IN AN ORGANIZED HEALTH CARE EDUCATION/TRAINING PROGRAM

## 2024-02-28 PROCEDURE — 1157F ADVNC CARE PLAN IN RCRD: CPT | Performed by: STUDENT IN AN ORGANIZED HEALTH CARE EDUCATION/TRAINING PROGRAM

## 2024-02-28 PROCEDURE — 1036F TOBACCO NON-USER: CPT | Performed by: STUDENT IN AN ORGANIZED HEALTH CARE EDUCATION/TRAINING PROGRAM

## 2024-02-28 PROCEDURE — 1160F RVW MEDS BY RX/DR IN RCRD: CPT | Performed by: STUDENT IN AN ORGANIZED HEALTH CARE EDUCATION/TRAINING PROGRAM

## 2024-02-28 PROCEDURE — 3075F SYST BP GE 130 - 139MM HG: CPT | Performed by: STUDENT IN AN ORGANIZED HEALTH CARE EDUCATION/TRAINING PROGRAM

## 2024-02-28 PROCEDURE — 3079F DIAST BP 80-89 MM HG: CPT | Performed by: STUDENT IN AN ORGANIZED HEALTH CARE EDUCATION/TRAINING PROGRAM

## 2024-02-28 NOTE — PROGRESS NOTES
CC: Abdominal pain.    History of Present Illness:   Daysi Resendiz is a 74 y.o. female with a PMH of HTN, HLD, carotid artery stenosis, [place]'s disease, CKD, TIA, anxiety, obesity, diverticulosis who presents to clinic for left lower quadrant abdominal pain.  Patient is somewhat of a poor informant.  She states that she woke up with the pain in the middle of January.  It happens every morning.  It is severe.  It lessens as the day goes on.  She also feels like she may have had this pain previously, and that radiated to her joints.  It may be associated with her bowel movements, but she claims her bowel movements are normal.  She denies any other GI complaints.    Colonoscopy 10/2021: Normal TI, post mucosectomy scar, diverticulosis, hemorrhoids.  Colonoscopy 3/2021: 35 mm polyp.  3 other polyps, diverticulosis, hemorrhoids.  Colonoscopy 1/2021: 2 polyps, 35 mm polyp, diverticulosis, hemorrhoids.    Review of Systems  ROS Negative unless otherwise stated above.    Past Medical/Surgical History  Past Medical History:   Diagnosis Date    Asymptomatic menopausal state     Post-menopausal    Cerebral infarction due to unspecified occlusion or stenosis of unspecified cerebral artery (CMS/[facility]) 12/20/2021    Right pontine stroke    Other conditions influencing health status     Stroke syndrome    Other tear of lateral meniscus, current injury, unspecified knee, initial encounter 02/21/2020    Lateral meniscal tear    Personal history of other specified conditions 12/29/2021    History of headache    Personal history of other specified conditions 01/10/2022    History of fever    Personal history of other specified conditions 12/29/2021    History of fatigue    Personal history of transient ischemic attack (TIA), and cerebral infarction without residual deficits 12/20/2021    History of transient ischemic attack    Unilateral primary osteoarthritis, left knee 03/25/2020    Left knee DJD    Unspecified kidney failure      Renal failure      Past Surgical History:   Procedure Laterality Date    BLADDER SURGERY  10/29/2013    Bladder Surgery    CT ANGIO NECK  6/18/2019    CT NECK ANGIO W AND WO IV CONTRAST 6/18/2019 [facility]    HYSTERECTOMY  01/28/2020    Hysterectomy    MR HEAD ANGIO WO IV CONTRAST  6/13/2019    MR HEAD ANGIO WO IV CONTRAST 6/13/2019 [facility]    MR NECK ANGIO WO IV CONTRAST  6/13/2019    MR NECK ANGIO WO IV CONTRAST 6/13/2019 [facility]    OTHER SURGICAL HISTORY  11/24/2021    Surgery    OTHER SURGICAL HISTORY  11/24/2021    Varicose vein ligation    OTHER SURGICAL HISTORY  11/24/2021    Knee replacement    OTHER SURGICAL HISTORY  11/24/2021    Colonoscopy    OTHER SURGICAL HISTORY  03/02/2020    Knee arthroscopy        Social History   reports that she has quit smoking. Her smoking use included cigarettes. She has never used smokeless tobacco. She reports that she does not currently use alcohol. She reports that she does not currently use drugs.     Family History  family history includes Atrial fibrillation in her mother; Colon cancer in her sister; Coronary artery disease in her brother; Diabetes in her father; Heart failure in her mother; Hypertension in her brother, father, and mother; Lymphoma in her father; Skin cancer in her father; Thyroid disease in her daughter, father, and mother; bladder cancer in her brother; genital cancer in her paternal grandmother; pacemaker in her mother.     Allergies  Allergies   Allergen Reactions    Bee Venom Protein (Honey Bee) Anaphylaxis    Labetalol Unknown    Amoxicillin Itching    Aspirin Hives and Unknown    Influenza Virus Vaccine, Live Attenuated Other    Influenza Virus Vaccines Unknown    Losartan-Hydrochlorothiazide Unknown    Methylprednisolone Hives and Unknown    Metoclopramide Unknown     raised B/P    Midazolam Other and Unknown     had stroke    Pneumoc 13-Yanet Conj-Dip Cr(Pf) Unknown    Pneumococcal 23-Yanet Ps Vaccine Unknown     "Pneumococcal 23-Valent Polysaccharide Vaccine Unknown    Sulfa (Sulfonamide Antibiotics) Unknown       Medications  Current Outpatient Medications   Medication Instructions    atorvastatin (LIPITOR) 20 mg, oral, Nightly    BD SafetyGlide Needle 25 gauge x 1\" needle USE WITH SOLUCORTEF FOR ADRENAL CRISIS.    clopidogrel (PLAVIX) 75 mg, oral, Daily    hydroCHLOROthiazide (MICROZIDE) 12.5 mg, oral, Daily    hydrocortisone (CORTEF) 15 mg, oral, Every morning,  TAKE ONE AND A HALF TABLETS BY MOUTH IN THE MORNING THEN TAKE HALF A TABLET AT 3PM THEN TAKE HALF A TABLET AT BEDTIME    metoprolol succinate XL (TOPROL-XL) 50 mg, oral, Daily    Solu-CORTEF Act-O-Vial, PF, 100 mg/2 mL injection INJECT 100 MG( 2 ML) IM FOR ADRENAL CRISIS.        Objective   Visit Vitals  /82   Pulse 71   Resp 16        General: A&Ox3, NAD.  HEENT: AT/NC.   CV: RRR. No murmur.  Resp: CTA bilaterally. No wheezing, rhonchi or rales.   GI: Soft, ND.  TTP in the left lower quadrant + TTP of the left hip joint.  Extrem: No edema. Pulses intact.  Skin: No Jaundice.   Neuro: No focal deficits.   Psych: Normal mood and affect.     Lab Results   Component Value Date    WBC 9.3 02/08/2024    HGB 13.6 02/08/2024    HCT 40.9 02/08/2024    MCV 90 02/08/2024     02/08/2024       Chemistry    Lab Results   Component Value Date/Time     02/08/2024 0918    K 4.2 02/08/2024 0918     02/08/2024 0918    CO2 30 02/08/2024 0918    BUN 21 02/08/2024 0918    CREATININE 1.28 (H) 02/08/2024 0918    Lab Results   Component Value Date/Time    CALCIUM 10.1 02/08/2024 0918    ALKPHOS 70 02/08/2024 0918    AST 14 02/08/2024 0918    ALT 11 02/08/2024 0918    BILITOT 0.6 02/08/2024 0918             ASSESSMENT/PLAN  Daysi Resendiz is a 74 y.o. female with a PMH of HTN, HLD, carotid artery stenosis, [place]'s disease, CKD, TIA, anxiety, obesity, diverticulosis who presents to clinic for left lower quadrant abdominal pain. Symptoms started in 1/2024. CT " imaging from last year did show increase stool burden.      Interestingly, it appears the worst of her pain is located over the left hip joint.  Suspect her left lower abdominal quadrant pain is likely related to constipation.  Consider diverticulitis (less likely, but possible). Obtain CT A/P with IV contrast. Start bowel clean out followed by daily fiber supplement with titration as needed.  Will discuss surveillance colonoscopy at her next appointment.    Yann Costello, DO

## 2024-02-29 ENCOUNTER — TELEPHONE (OUTPATIENT)
Dept: GASTROENTEROLOGY | Facility: CLINIC | Age: 74
End: 2024-02-29
Payer: COMMERCIAL

## 2024-02-29 NOTE — TELEPHONE ENCOUNTER
Pt called stating that she spoke with her other doctor about CT scan, she said that order needs put in prior to CT for hydration 1 hr before, 3 hours after, 1cc/minute per hour.

## 2024-03-07 NOTE — TELEPHONE ENCOUNTER
Just got off the phone with Dr. Tony Jean Baptiste's RN, she states that after speaking with doctor, she is to receive hydration 1 hr prior, 100 cc/hr. I clarify's instructions with her, she states that hydration is NOT required after.

## 2024-03-14 ENCOUNTER — TELEPHONE (OUTPATIENT)
Dept: PRIMARY CARE | Facility: CLINIC | Age: 74
End: 2024-03-14
Payer: COMMERCIAL

## 2024-03-14 NOTE — TELEPHONE ENCOUNTER
Patient called the office and advised that she had an ultrasound done at [facility] that was ordered by her Urologist.   There was some sort of incidental finding in the ultrasound results. Patient was advised to call her PCP to follow up on the finding. Results printed and placed on desk for you to review.     I did advise the patient to schedule an appointment to follow up on results but Dr. Pike is booking out until May. If Dr. Pike wants to double book patient to review results we can call the patient to reschedule.

## 2024-03-19 ENCOUNTER — HOSPITAL ENCOUNTER (OUTPATIENT)
Dept: RADIOLOGY | Facility: HOSPITAL | Age: 74
Discharge: HOME | End: 2024-03-19
Payer: COMMERCIAL

## 2024-03-19 DIAGNOSIS — R10.32 LEFT LOWER QUADRANT ABDOMINAL PAIN: ICD-10-CM

## 2024-03-19 LAB
CREAT SERPL-MCNC: 1.3 MG/DL (ref 0.6–1.3)
GFR SERPL CREATININE-BSD FRML MDRD: 43 ML/MIN/1.73M*2

## 2024-03-19 PROCEDURE — 2500000004 HC RX 250 GENERAL PHARMACY W/ HCPCS (ALT 636 FOR OP/ED): Performed by: INTERNAL MEDICINE

## 2024-03-19 PROCEDURE — 2550000001 HC RX 255 CONTRASTS: Performed by: STUDENT IN AN ORGANIZED HEALTH CARE EDUCATION/TRAINING PROGRAM

## 2024-03-19 PROCEDURE — 96360 HYDRATION IV INFUSION INIT: CPT | Mod: 59

## 2024-03-19 PROCEDURE — 82565 ASSAY OF CREATININE: CPT

## 2024-03-19 PROCEDURE — 96361 HYDRATE IV INFUSION ADD-ON: CPT

## 2024-03-19 PROCEDURE — 74177 CT ABD & PELVIS W/CONTRAST: CPT | Performed by: RADIOLOGY

## 2024-03-19 RX ADMIN — IOHEXOL 75 ML: 350 INJECTION, SOLUTION INTRAVENOUS at 11:33

## 2024-03-19 RX ADMIN — SODIUM CHLORIDE 450 ML: 9 INJECTION, SOLUTION INTRAVENOUS at 10:00

## 2024-03-20 ENCOUNTER — OFFICE VISIT (OUTPATIENT)
Dept: PRIMARY CARE | Facility: CLINIC | Age: 74
End: 2024-03-20
Payer: COMMERCIAL

## 2024-03-20 DIAGNOSIS — R10.9 LEFT SIDED ABDOMINAL PAIN: ICD-10-CM

## 2024-03-20 DIAGNOSIS — I87.393 CHRONIC VENOUS HYPERTENSION WITH COMPLICATION INVOLVING BOTH SIDES: Primary | ICD-10-CM

## 2024-03-20 DIAGNOSIS — I87.8 VENOUS STASIS OF BOTH LOWER EXTREMITIES: ICD-10-CM

## 2024-03-20 PROCEDURE — 1157F ADVNC CARE PLAN IN RCRD: CPT | Performed by: INTERNAL MEDICINE

## 2024-03-20 PROCEDURE — 1036F TOBACCO NON-USER: CPT | Performed by: INTERNAL MEDICINE

## 2024-03-20 PROCEDURE — 1159F MED LIST DOCD IN RCRD: CPT | Performed by: INTERNAL MEDICINE

## 2024-03-20 PROCEDURE — 1160F RVW MEDS BY RX/DR IN RCRD: CPT | Performed by: INTERNAL MEDICINE

## 2024-03-20 PROCEDURE — 99212 OFFICE O/P EST SF 10 MIN: CPT | Performed by: INTERNAL MEDICINE

## 2024-03-20 NOTE — PROGRESS NOTES
74 years old female who has bilateral leg symptoms with chronic venous insufficiency and has had previous procedures found to have refluxing superficial venous system which needed treatment.  Because of her allergies patient will have treatment with polidocanol foam sclerotherapy with Varithena which is approved by the insurance.  Patient came today for the procedure but she has left abdominal or lower quadrant discomfort which she is being investigated and had a ultrasound of the kidney which showed questionable abnormalities in the left adrenal area and she has a CT scan done which the results are pending.  She also is going to follow-up with oncologist and urologist.  She says she is going to have a MRI done by the oncology team since she has had previous adrenal surgery.  Given all the symptoms without any resolution it was decided to postpone her form sclerotherapy.  I will follow her up in 6 weeks time to have reevaluation to decide about any treatment options as appropriate.

## 2024-03-21 ENCOUNTER — APPOINTMENT (OUTPATIENT)
Dept: VASCULAR SURGERY | Facility: CLINIC | Age: 74
End: 2024-03-21
Payer: COMMERCIAL

## 2024-03-21 ENCOUNTER — APPOINTMENT (OUTPATIENT)
Dept: PRIMARY CARE | Facility: CLINIC | Age: 74
End: 2024-03-21
Payer: COMMERCIAL

## 2024-03-25 ENCOUNTER — APPOINTMENT (OUTPATIENT)
Dept: VASCULAR SURGERY | Facility: CLINIC | Age: 74
End: 2024-03-25
Payer: COMMERCIAL

## 2024-03-28 ENCOUNTER — LAB (OUTPATIENT)
Dept: LAB | Facility: LAB | Age: 74
End: 2024-03-28
Payer: COMMERCIAL

## 2024-03-28 DIAGNOSIS — N18.31 CHRONIC KIDNEY DISEASE, STAGE 3A (MULTI): ICD-10-CM

## 2024-03-28 DIAGNOSIS — I12.9 HYPERTENSIVE CHRONIC KIDNEY DISEASE WITH STAGE 1 THROUGH STAGE 4 CHRONIC KIDNEY DISEASE, OR UNSPECIFIED CHRONIC KIDNEY DISEASE: ICD-10-CM

## 2024-03-28 DIAGNOSIS — N39.0 URINARY TRACT INFECTION, SITE NOT SPECIFIED: ICD-10-CM

## 2024-03-28 DIAGNOSIS — E55.9 VITAMIN D DEFICIENCY, UNSPECIFIED: ICD-10-CM

## 2024-03-28 LAB
25(OH)D3 SERPL-MCNC: 34 NG/ML (ref 30–100)
ALBUMIN SERPL BCP-MCNC: 4.6 G/DL (ref 3.4–5)
ANION GAP SERPL CALC-SCNC: 13 MMOL/L (ref 10–20)
APPEARANCE UR: CLEAR
BILIRUB UR STRIP.AUTO-MCNC: NEGATIVE MG/DL
BUN SERPL-MCNC: 17 MG/DL (ref 6–23)
CALCIUM SERPL-MCNC: 10.6 MG/DL (ref 8.6–10.3)
CHLORIDE SERPL-SCNC: 101 MMOL/L (ref 98–107)
CO2 SERPL-SCNC: 28 MMOL/L (ref 21–32)
COLOR UR: ABNORMAL
CREAT SERPL-MCNC: 1.25 MG/DL (ref 0.5–1.05)
CREAT UR-MCNC: 41.1 MG/DL (ref 20–320)
EGFRCR SERPLBLD CKD-EPI 2021: 45 ML/MIN/1.73M*2
ERYTHROCYTE [DISTWIDTH] IN BLOOD BY AUTOMATED COUNT: 13.8 % (ref 11.5–14.5)
GLUCOSE SERPL-MCNC: 102 MG/DL (ref 74–99)
GLUCOSE UR STRIP.AUTO-MCNC: NEGATIVE MG/DL
HCT VFR BLD AUTO: 41.7 % (ref 36–46)
HGB BLD-MCNC: 14.1 G/DL (ref 12–16)
KETONES UR STRIP.AUTO-MCNC: NEGATIVE MG/DL
LEUKOCYTE ESTERASE UR QL STRIP.AUTO: ABNORMAL
MCH RBC QN AUTO: 30.9 PG (ref 26–34)
MCHC RBC AUTO-ENTMCNC: 33.8 G/DL (ref 32–36)
MCV RBC AUTO: 91 FL (ref 80–100)
MICROALBUMIN UR-MCNC: 10.6 MG/L
MICROALBUMIN/CREAT UR: 25.8 UG/MG CREAT
NITRITE UR QL STRIP.AUTO: NEGATIVE
NRBC BLD-RTO: 0 /100 WBCS (ref 0–0)
PH UR STRIP.AUTO: 6 [PH]
PHOSPHATE SERPL-MCNC: 3.9 MG/DL (ref 2.5–4.9)
PLATELET # BLD AUTO: 289 X10*3/UL (ref 150–450)
POTASSIUM SERPL-SCNC: 4.8 MMOL/L (ref 3.5–5.3)
PROT UR STRIP.AUTO-MCNC: NEGATIVE MG/DL
PTH-INTACT SERPL-MCNC: 91.8 PG/ML (ref 18.5–88)
RBC # BLD AUTO: 4.57 X10*6/UL (ref 4–5.2)
RBC # UR STRIP.AUTO: ABNORMAL /UL
RBC #/AREA URNS AUTO: ABNORMAL /HPF
SODIUM SERPL-SCNC: 137 MMOL/L (ref 136–145)
SP GR UR STRIP.AUTO: 1.01
SQUAMOUS #/AREA URNS AUTO: ABNORMAL /HPF
UROBILINOGEN UR STRIP.AUTO-MCNC: <2 MG/DL
WBC # BLD AUTO: 9.9 X10*3/UL (ref 4.4–11.3)
WBC #/AREA URNS AUTO: ABNORMAL /HPF

## 2024-03-28 PROCEDURE — 36415 COLL VENOUS BLD VENIPUNCTURE: CPT

## 2024-03-28 PROCEDURE — 85027 COMPLETE CBC AUTOMATED: CPT

## 2024-03-28 PROCEDURE — 82570 ASSAY OF URINE CREATININE: CPT

## 2024-03-28 PROCEDURE — 83970 ASSAY OF PARATHORMONE: CPT

## 2024-03-28 PROCEDURE — 82043 UR ALBUMIN QUANTITATIVE: CPT

## 2024-03-28 PROCEDURE — 81001 URINALYSIS AUTO W/SCOPE: CPT

## 2024-03-28 PROCEDURE — 80069 RENAL FUNCTION PANEL: CPT

## 2024-03-28 PROCEDURE — 82306 VITAMIN D 25 HYDROXY: CPT

## 2024-04-05 ENCOUNTER — OFFICE VISIT (OUTPATIENT)
Dept: PRIMARY CARE | Facility: CLINIC | Age: 74
End: 2024-04-05
Payer: COMMERCIAL

## 2024-04-05 ENCOUNTER — TELEPHONE (OUTPATIENT)
Dept: GASTROENTEROLOGY | Facility: CLINIC | Age: 74
End: 2024-04-05

## 2024-04-05 VITALS
BODY MASS INDEX: 31.01 KG/M2 | TEMPERATURE: 97.1 F | WEIGHT: 197.6 LBS | OXYGEN SATURATION: 97 % | SYSTOLIC BLOOD PRESSURE: 146 MMHG | HEART RATE: 71 BPM | HEIGHT: 67 IN | DIASTOLIC BLOOD PRESSURE: 60 MMHG

## 2024-04-05 DIAGNOSIS — I10 BENIGN ESSENTIAL HYPERTENSION: ICD-10-CM

## 2024-04-05 DIAGNOSIS — Z02.89 ENCOUNTER FOR COMPLETION OF FORM WITH PATIENT: ICD-10-CM

## 2024-04-05 DIAGNOSIS — R10.9 LEFT SIDED ABDOMINAL PAIN OF UNKNOWN CAUSE: Primary | ICD-10-CM

## 2024-04-05 DIAGNOSIS — Z71.2 ENCOUNTER TO DISCUSS TEST RESULTS: ICD-10-CM

## 2024-04-05 DIAGNOSIS — E27.1 ADDISON'S DISEASE (MULTI): ICD-10-CM

## 2024-04-05 RX ORDER — OXYCODONE AND ACETAMINOPHEN 5; 325 MG/1; MG/1
1 TABLET ORAL EVERY 6 HOURS PRN
Qty: 5 TABLET | Refills: 0 | Status: SHIPPED | OUTPATIENT
Start: 2024-04-05 | End: 2024-04-12

## 2024-04-05 RX ORDER — HYDROCHLOROTHIAZIDE 12.5 MG/1
25 TABLET ORAL 2 TIMES DAILY
Qty: 90 TABLET | Refills: 1 | Status: SHIPPED | OUTPATIENT
Start: 2024-04-05 | End: 2024-05-06 | Stop reason: SDUPTHER

## 2024-04-05 RX ORDER — METOPROLOL SUCCINATE 25 MG/1
50 TABLET, EXTENDED RELEASE ORAL DAILY
Qty: 90 TABLET | Refills: 3 | Status: SHIPPED | OUTPATIENT
Start: 2024-04-05 | End: 2024-04-05 | Stop reason: SDUPTHER

## 2024-04-05 RX ORDER — METOPROLOL SUCCINATE 25 MG/1
50 TABLET, EXTENDED RELEASE ORAL DAILY
Qty: 90 TABLET | Refills: 3 | Status: SHIPPED | OUTPATIENT
Start: 2024-04-05

## 2024-04-05 ASSESSMENT — PATIENT HEALTH QUESTIONNAIRE - PHQ9
2. FEELING DOWN, DEPRESSED OR HOPELESS: NOT AT ALL
1. LITTLE INTEREST OR PLEASURE IN DOING THINGS: NOT AT ALL
SUM OF ALL RESPONSES TO PHQ9 QUESTIONS 1 AND 2: 0

## 2024-04-05 NOTE — ASSESSMENT & PLAN NOTE
"Left lateral pain point injection at the iliac crest with 40 mg of Depo-Medrol.  1.5\" 22 g. needle  Patient tolerated the injection without difficulty.  Ambulating without difficulty.  "

## 2024-04-05 NOTE — ASSESSMENT & PLAN NOTE
Form completed for pending colonoscopy.  Patient did fine in the past holding her Plavix for 7 days prior to procedure.  Resume after completion of procedure.

## 2024-04-05 NOTE — PROGRESS NOTES
"Subjective   Chief complaint: Daysi Resendiz is a 74 y.o. female who presents for Results (Patient is in office today to go over results US & CT Scan. Patient advises that she is still experiencing the same pain since January. Patient advises that she was advised that there was findings of nodules below her rib/ lungs and a growth on her bilateral kidneys.   ).    HPI:  Presents today with a complaint of left-sided abdominal discomfort.  She feels that it has been going on for the last month.  She reports that it is persistent and is very uncomfortable for her.  It is worse pain in the morning upon rising.  She has pain during the day but she gets through the day without difficulty.  She had a similar problem in the past and was given a steroid injection by Dr. Webb which helped.  She would like to try this again.  She will be seeing Dr. Dick in 4 days for nephrology.  She also has questions about imaging that was ordered for her.  An ultrasound was done March 11 which specified an upper pole left kidney mass and radiology recommended a CT.  She had a CT on March 19 which revealed a right renal cyst but mentioned a left \"base\" nodule that did not require follow-up.  There was no mention of an upper left kidney mass.    Patient also had blood work recently and would like to review the results together.  She sees Dr. Ruiz for endocrinology who has noticed that she has an elevated PTH and ACTH level.  She continues to be treated for AI.  Besides her left-sided pain, she is doing fine.    Medical form needs to be completed for a pending colonoscopy.  Instructions needed for holding her Plavix.  Has had to hold it in the past and has held it for one week and it was resumed after the last procedure.          Objective   /60 (BP Location: Left arm, Patient Position: Sitting, BP Cuff Size: Large adult)   Pulse 71   Temp 36.2 °C (97.1 °F) (Temporal)   Ht 1.702 m (5' 7\")   Wt 89.6 kg (197 lb 9.6 oz)   " "SpO2 97% Comment: RA  BMI 30.95 kg/m²   Physical Exam  General:  Alert, oriented, no acute distress  Eyes:  Sclerae white, PER, conjunctivae clear  ENT:  No nasal congestion.    Neck: Supple  Respiratory:  No dyspnea.  MS:  left lateral abdomen and lateral torso discomfort.    Injection today of 40 mg Depo-Medrol at the left iliac crest location after local cleansing with alcohol.  Patient tolerated the injection without concern.  No bleeding noted at site.  Antibiotic topical and sterile band-aid applied at the injection site. Patient reported no pain nor concern after injection.  Vascular:  No edema.  Skin warm and dry.  CNS:  No gross neurological deficits.  Gait within normal limits.    Psychiatric:  Affect is positive and appropriate.  No depression.  No anxiety.    Review of Systems   I have reviewed and reconciled the medication list with the patient today.   Current Outpatient Medications:   •  atorvastatin (Lipitor) 20 mg tablet, Take 1 tablet (20 mg) by mouth once daily at bedtime., Disp: 90 tablet, Rfl: 3  •  BD SafetyGlide Needle 25 gauge x 1\" needle, USE WITH SOLUCORTEF FOR ADRENAL CRISIS., Disp: , Rfl:   •  clopidogrel (Plavix) 75 mg tablet, Take 1 tablet (75 mg) by mouth once daily., Disp: 90 tablet, Rfl: 1  •  hydrocortisone (Cortef) 10 mg tablet, Take 1.5 tablets (15 mg) by mouth once daily in the morning.  TAKE ONE AND A HALF TABLETS BY MOUTH IN THE MORNING THEN TAKE HALF A TABLET AT 3PM THEN TAKE HALF A TABLET AT BEDTIME (Patient taking differently: Take 1.5 tablets (15 mg) by mouth once daily in the morning.  TAKE ONE AND A HALF TABLETS BY MOUTH IN THE MORNING THEN TAKE HALF A TABLET AT 3PM), Disp: 270 tablet, Rfl: 1  •  Solu-CORTEF Act-O-Vial, PF, 100 mg/2 mL injection, INJECT 100 MG( 2 ML) IM FOR ADRENAL CRISIS., Disp: , Rfl:   •  hydroCHLOROthiazide (Microzide) 12.5 mg tablet, Take 2 tablets (25 mg) by mouth 2 times a day., Disp: 90 tablet, Rfl: 1  •  metoprolol succinate XL (Toprol-XL) 25 mg " 24 hr tablet, Take 2 tablets (50 mg) by mouth once daily., Disp: 90 tablet, Rfl: 3  •  oxyCODONE-acetaminophen (Percocet) 5-325 mg tablet, Take 1 tablet by mouth every 6 hours if needed for severe pain (7 - 10) for up to 7 days., Disp: 5 tablet, Rfl: 0     Imaging:  CT abdomen pelvis w IV contrast    Result Date: 3/20/2024  Interpreted By:  Eduardo Langford, STUDY: CT ABDOMEN PELVIS W IV CONTRAST;  3/19/2024 1:16 pm   INDICATION: Signs/Symptoms:Severe abdominal pain.. May 10, 2023 CT pelvis and January 24, 2023 CT abdomen and pelvis and January 24, 2023 CT abdomen and pelvis   COMPARISON: January 24, 2023 CT abdomen and pelvis and May 10, 2023 pelvic ultrasound.   ACCESSION NUMBER(S): [id number]   ORDERING CLINICIAN: KARAN PADRON   TECHNIQUE: CT of the abdomen and pelvis was performed.  Standard contiguous axial images were obtained at 3 mm slice thickness through the abdomen and pelvis. Coronal and sagittal reconstructions at 3 mm slice thickness were performed.   75 milliliters of Omnipaque 350 were administered intravenously without immediate complication. The patient received 450 ml of intravenous Sodium Chloride).9% solution beginning 1 hour prior to the examination for 4 hours after the examination at 90ml/hour per nephrology request.   FINDINGS: LOWER CHEST: Stable juxtapleural 4 x 3 millimeter nodule series 204, image 9.   ABDOMEN:   LIVER: The liver demonstrates homogeneous attenuation without evidence of focal liver lesions.   BILE DUCTS: Normal caliber.   GALLBLADDER: No calcified stones. No wall thickening.   PANCREAS: The pancreas appears unremarkable without evidence of ductal dilatation or masses.   SPLEEN: The spleen is normal in size without focal lesions.   ADRENAL GLANDS: Normal right adrenal gland. The left adrenal gland is not identified.   KIDNEYS AND URETERS: There is a similar right renal exophytic cyst upper portion measuring 5.5 x 5.2 x 5.3 centimeter. No detected urinary tract  solid mass, calculus, or hydronephrosis.   PELVIS:   BLADDER: The urinary bladder is decompressed, limited for evaluation. No detected mass, or calculus. Stable metallic clip inseparable from the inferior wall of the urinary bladder sagittal series 203, image 62.   REPRODUCTIVE ORGANS: No detected pelvic mass. The uterus and ovaries are not identified.   BOWEL: The stomach and bowel are normal caliber without detected wall thickening or inflammatory change. There is normal appendix. There is mild colonic diverticulosis.   VESSELS: Mild atherosclerosis without evident aneurysm.   PERITONEUM/RETROPERITONEUM/LYMPH NODES: No ascites or free air, no fluid collection.  No abdominopelvic lymphadenopathy is present.   BONES AND ABDOMINAL WALL: No suspicious osseous lesions are identified. Degenerative discogenic disease is noted in the lower thoracic and lumbar spine. Stable probably benign striated lucency L2 vertebral body compatible with hemangioma. No acute body wall abnormality.       1.  No acute detected abnormality. Stable metallic clip within or adjacent to the wall at the anterior margin of the urinary bladder. Correlate with surgical history and symptomatology. 2. Stable probably benign cyst right kidney. 3. Stable probably benign 4 x 3 millimeter nodule left base. Given long-term stability, no additional follow-up required per Fleischner criteria. 4. Multifocal degenerative changes lumbar spine. 5. Colonic diverticulosis.   Incidental Finding:  A non-calcified pulmonary nodule/multiple non-calcified pulmonary nodules measuring less than 6 mm, likely benign.  (**-YCF-**)   Instructions:  No further follow-up is required, however, if the patient has high risk factors for primary lung malignancy, follow-up noncontrast CT scan chest in 12 months may be obtained. (Rudi Monahan et al., Guidelines for management of incidental pulmonary nodules detected on CT images: From the Fleischner Society 2017, Radiology. 2017  Jul;284 (1):228-243.) [email or web address]   Signed by: Eduardo Langford 3/20/2024 3:32 PM Dictation workstation:   [id number]    US PELVIS BLADDER-NB    Result Date: 3/12/2024  * * *Final Report* * * DATE OF EXAM: Mar 11 2024  3:53PM   [facility]   [id number]  -  US PELVIS BLADDER -NB  / ACCESSION #  [id number] PROCEDURE REASON: multiple diagnoses      * * * * Physician Interpretation * * * *  EXAMINATION:   RENAL ULTRASOUND CLINICAL HISTORY: Recurrent UTIs.  Left flank pain. TECHNIQUE:  Sonography of the kidneys and urinary bladder was performed.   Images were obtained and stored in a permanent archive. MQ:  UR_1 COMPARISON: None RESULT: Right Kidney:      -Renal length: 9 cm      -Parenchyma: Normal parenchymal echogenicity.  Normal parenchymal thickness.      -Collecting system: No hydronephrosis.      -Calculus: No echogenic, shadowing calculus.      -Lesion:  There is a 4.9 x 3.9 x 4.2 cm anechoic structure arising from the upper pole of the right kidney.  No solid components or internal vascularity are noted. Left Kidney:      -Renal length: 9.9 cm      -Parenchyma: Normal parenchymal echogenicity.  Normal parenchymal thickness.      -Collecting system: No hydronephrosis.      -Calculus: No echogenic, shadowing calculus.      -Lesion:  There is a 1.7 x 1.6 x 1.3 cm heterogeneous structure arising from the upper pole of the left kidney.  Wall thickening and internal vascularity are noted. Bladder: No focal bladder mass, calculus, or wall thickening is noted.   Prior to voiding, the urinary bladder measured approximately 4.7 x 5.6 x 7 cm, resulting in a prevoid volume of 120 mL.  After voiding, the urinary bladder measured 1.3 x 3.3 x 2.4 cm, resulting in a postvoid volume of approximately 8 mL. Other: Incidental note is made of increased echogenicity of the liver, compatible with hepatic steatosis.    IMPRESSION: 1.  No sonographic evidence of bladder calculus, mass, or wall thickening.  No significant post void residual.  2.  Heterogeneous structure arising from the upper pole of the left kidney.  This is incompletely characterized.  Consider further evaluation with renal mass protocol CT. 3.  Simple appearing right renal cyst. ACTIONABLE RESULT: FOLLOW-UP Acuity: Actionable Findings: Kidneys/Ureters/Bladder Routing Code:  GU_1 Recommendation: CT KIDNEY WO/W IVCON Time Frame: At the discretion of the clinical team. COMMUNICATION: Results will be communicated with the ordering provider via Epic staff message or phone message by Imaging Support Services within 2 business days of report finalization. --END OF FINDING-- : PSCB   Transcribe Date/Time: Mar 12 2024 12:08P Dictated by : CYNTHIA REA MD This examination was interpreted and the report reviewed and electronically signed by: CYNTHIA REA MD on Mar 12 2024 12:17PM  EST    US renal complete    Result Date: 3/12/2024  * * *Final Report* * * DATE OF EXAM: Mar 11 2024  3:53PM   [facility]   1055  -  US KIDNEY/BLADDER  / ACCESSION #  [id number] PROCEDURE REASON: multiple diagnoses      * * * * Physician Interpretation * * * *  EXAMINATION:   RENAL ULTRASOUND CLINICAL HISTORY: Recurrent UTIs.  Left flank pain. TECHNIQUE:  Sonography of the kidneys and urinary bladder was performed.   Images were obtained and stored in a permanent archive. MQ:  UR_1 COMPARISON: None RESULT: Right Kidney:      -Renal length: 9 cm      -Parenchyma: Normal parenchymal echogenicity.  Normal parenchymal thickness.      -Collecting system: No hydronephrosis.      -Calculus: No echogenic, shadowing calculus.      -Lesion:  There is a 4.9 x 3.9 x 4.2 cm anechoic structure arising from the upper pole of the right kidney.  No solid components or internal vascularity are noted. Left Kidney:      -Renal length: 9.9 cm      -Parenchyma: Normal parenchymal echogenicity.  Normal parenchymal thickness.      -Collecting system: No hydronephrosis.      -Calculus: No echogenic, shadowing calculus.       -Lesion:  There is a 1.7 x 1.6 x 1.3 cm heterogeneous structure arising from the upper pole of the left kidney.  Wall thickening and internal vascularity are noted. Bladder: No focal bladder mass, calculus, or wall thickening is noted.   Prior to voiding, the urinary bladder measured approximately 4.7 x 5.6 x 7 cm, resulting in a prevoid volume of 120 mL.  After voiding, the urinary bladder measured 1.3 x 3.3 x 2.4 cm, resulting in a postvoid volume of approximately 8 mL. Other: Incidental note is made of increased echogenicity of the liver, compatible with hepatic steatosis.    IMPRESSION: 1.  No sonographic evidence of bladder calculus, mass, or wall thickening.  No significant post void residual. 2.  Heterogeneous structure arising from the upper pole of the left kidney.  This is incompletely characterized.  Consider further evaluation with renal mass protocol CT. 3.  Simple appearing right renal cyst. ACTIONABLE RESULT: FOLLOW-UP Acuity: Actionable Findings: Kidneys/Ureters/Bladder Routing Code:  GU_1 Recommendation: CT KIDNEY WO/W IVCON Time Frame: At the discretion of the clinical team. COMMUNICATION: Results will be communicated with the ordering provider via Epic staff message or phone message by [facility] Support Services within 2 business days of report finalization. --END OF FINDING-- : MARYELLEN   Transcribe Date/Time: Mar 12 2024 12:08P Dictated by : CYNTHIA REA MD This examination was interpreted and the report reviewed and electronically signed by: CYNTHIA REA MD on Mar 12 2024 12:17PM  EST       Labs reviewed:    Lab Results   Component Value Date    WBC 9.9 03/28/2024    HGB 14.1 03/28/2024    HCT 41.7 03/28/2024     03/28/2024    CHOL 153 05/10/2022    TRIG 113 05/10/2022    HDL 49.0 05/10/2022    ALT 11 02/08/2024    AST 14 02/08/2024     03/28/2024    K 4.8 03/28/2024     03/28/2024    CREATININE 1.25 (H) 03/28/2024    BUN 17 03/28/2024    CO2 28 03/28/2024  "   TSH 2.90 02/08/2024    INR 1.0 06/24/2020    HGBA1C 5.5 06/08/2021       Assessment/Plan   Problem List Items Addressed This Visit       Hartford's disease (CMS/HCC)    Benign essential hypertension     Mild elevation in pressure today.  Possibly due to her persistent pain.  Will monitor.         Relevant Medications    hydroCHLOROthiazide (Microzide) 12.5 mg tablet    metoprolol succinate XL (Toprol-XL) 25 mg 24 hr tablet    Encounter for completion of form with patient     Form completed for pending colonoscopy.  Patient did fine in the past holding her Plavix for 7 days prior to procedure.  Resume after completion of procedure.           Encounter to discuss test results     Reviewed lab/testing results with the patient and provided patient education regarding the results.           Left sided abdominal pain of unknown cause - Primary     Left lateral pain point injection at the iliac crest with 40 mg of Depo-Medrol.  1.5\" 22 g. needle  Patient tolerated the injection without difficulty.  Ambulating without difficulty.         Relevant Medications    oxyCODONE-acetaminophen (Percocet) 5-325 mg tablet       Continue other medications as listed  Follow up in 3-6 months.       "

## 2024-04-09 ENCOUNTER — LAB (OUTPATIENT)
Dept: LAB | Facility: LAB | Age: 74
End: 2024-04-09
Payer: COMMERCIAL

## 2024-04-09 DIAGNOSIS — I12.9 HYPERTENSIVE CHRONIC KIDNEY DISEASE WITH STAGE 1 THROUGH STAGE 4 CHRONIC KIDNEY DISEASE, OR UNSPECIFIED CHRONIC KIDNEY DISEASE: Primary | ICD-10-CM

## 2024-04-09 DIAGNOSIS — N18.32 CHRONIC KIDNEY DISEASE, STAGE 3B (MULTI): ICD-10-CM

## 2024-04-09 LAB
C3 SERPL-MCNC: 173 MG/DL (ref 87–200)
C4 SERPL-MCNC: 30 MG/DL (ref 10–50)

## 2024-04-09 PROCEDURE — 86160 COMPLEMENT ANTIGEN: CPT

## 2024-04-09 PROCEDURE — 36415 COLL VENOUS BLD VENIPUNCTURE: CPT

## 2024-04-09 PROCEDURE — 86038 ANTINUCLEAR ANTIBODIES: CPT

## 2024-04-10 LAB — ANA SER QL HEP2 SUBST: NEGATIVE

## 2024-04-18 ENCOUNTER — TELEPHONE (OUTPATIENT)
Dept: PRIMARY CARE | Facility: CLINIC | Age: 74
End: 2024-04-18
Payer: COMMERCIAL

## 2024-04-18 NOTE — TELEPHONE ENCOUNTER
Patient called the office and stated that she spoke to her insurance company in regards to a MR that has been ordered from Dr. Dick. Patient states that her insurance company told her that Dr. Pike needs to do a prior auth for the MR.   I called and spoke to the patient and advised that if Dr. Dick's office ordered the test then the prior auth/pre-cert will go through his office not Dr. Pike's.   Advised patient that I will have Darcy contact her in regards to the MR because it sounds like something needs to be done with pre-cert.

## 2024-04-19 NOTE — TELEPHONE ENCOUNTER
Spoke to Darcy today and confirmed. Patient needs to contact Dr. Dick's office in regards to the pre-cert/prior auth that is needed.

## 2024-04-24 RX ORDER — METHYLPREDNISOLONE ACETATE 40 MG/ML
40 INJECTION, SUSPENSION INTRA-ARTICULAR; INTRALESIONAL; INTRAMUSCULAR; SOFT TISSUE ONCE
Status: SHIPPED | OUTPATIENT
Start: 2024-04-24

## 2024-05-01 ENCOUNTER — HOSPITAL ENCOUNTER (OUTPATIENT)
Dept: RADIOLOGY | Facility: HOSPITAL | Age: 74
Discharge: HOME | End: 2024-05-01
Payer: COMMERCIAL

## 2024-05-01 DIAGNOSIS — N28.89 OTHER SPECIFIED DISORDERS OF KIDNEY AND URETER: ICD-10-CM

## 2024-05-01 PROCEDURE — 74183 MRI ABD W/O CNTR FLWD CNTR: CPT

## 2024-05-01 PROCEDURE — A9575 INJ GADOTERATE MEGLUMI 0.1ML: HCPCS | Performed by: INTERNAL MEDICINE

## 2024-05-01 PROCEDURE — 74183 MRI ABD W/O CNTR FLWD CNTR: CPT | Performed by: STUDENT IN AN ORGANIZED HEALTH CARE EDUCATION/TRAINING PROGRAM

## 2024-05-01 PROCEDURE — 2500000004 HC RX 250 GENERAL PHARMACY W/ HCPCS (ALT 636 FOR OP/ED): Performed by: INTERNAL MEDICINE

## 2024-05-01 PROCEDURE — 2550000001 HC RX 255 CONTRASTS: Performed by: INTERNAL MEDICINE

## 2024-05-01 RX ORDER — GADOTERATE MEGLUMINE 376.9 MG/ML
0.2 INJECTION INTRAVENOUS
Status: COMPLETED | OUTPATIENT
Start: 2024-05-01 | End: 2024-05-01

## 2024-05-01 RX ADMIN — SODIUM CHLORIDE 375 ML: 9 INJECTION, SOLUTION INTRAVENOUS at 09:15

## 2024-05-01 RX ADMIN — GADOTERATE MEGLUMINE 18 ML: 376.9 INJECTION INTRAVENOUS at 10:30

## 2024-05-03 ENCOUNTER — ANESTHESIA (OUTPATIENT)
Dept: GASTROENTEROLOGY | Facility: HOSPITAL | Age: 74
End: 2024-05-03
Payer: COMMERCIAL

## 2024-05-03 ENCOUNTER — HOSPITAL ENCOUNTER (OUTPATIENT)
Dept: GASTROENTEROLOGY | Facility: HOSPITAL | Age: 74
Discharge: HOME | End: 2024-05-03
Payer: COMMERCIAL

## 2024-05-03 ENCOUNTER — ANESTHESIA EVENT (OUTPATIENT)
Dept: GASTROENTEROLOGY | Facility: HOSPITAL | Age: 74
End: 2024-05-03
Payer: COMMERCIAL

## 2024-05-03 VITALS
TEMPERATURE: 97.3 F | RESPIRATION RATE: 18 BRPM | SYSTOLIC BLOOD PRESSURE: 133 MMHG | HEART RATE: 57 BPM | DIASTOLIC BLOOD PRESSURE: 57 MMHG | OXYGEN SATURATION: 99 %

## 2024-05-03 DIAGNOSIS — Z86.010 HISTORY OF COLON POLYPS: ICD-10-CM

## 2024-05-03 PROCEDURE — 88305 TISSUE EXAM BY PATHOLOGIST: CPT | Performed by: PATHOLOGY

## 2024-05-03 PROCEDURE — 2500000004 HC RX 250 GENERAL PHARMACY W/ HCPCS (ALT 636 FOR OP/ED): Performed by: NURSE ANESTHETIST, CERTIFIED REGISTERED

## 2024-05-03 PROCEDURE — 7100000009 HC PHASE TWO TIME - INITIAL BASE CHARGE

## 2024-05-03 PROCEDURE — 2500000004 HC RX 250 GENERAL PHARMACY W/ HCPCS (ALT 636 FOR OP/ED): Performed by: STUDENT IN AN ORGANIZED HEALTH CARE EDUCATION/TRAINING PROGRAM

## 2024-05-03 PROCEDURE — 2500000001 HC RX 250 WO HCPCS SELF ADMINISTERED DRUGS (ALT 637 FOR MEDICARE OP): Performed by: STUDENT IN AN ORGANIZED HEALTH CARE EDUCATION/TRAINING PROGRAM

## 2024-05-03 PROCEDURE — 0753T DGTZ GLS MCRSCP SLD LEVEL IV: CPT | Mod: TC,ELYLAB | Performed by: STUDENT IN AN ORGANIZED HEALTH CARE EDUCATION/TRAINING PROGRAM

## 2024-05-03 PROCEDURE — 3700000001 HC GENERAL ANESTHESIA TIME - INITIAL BASE CHARGE

## 2024-05-03 PROCEDURE — 3700000002 HC GENERAL ANESTHESIA TIME - EACH INCREMENTAL 1 MINUTE

## 2024-05-03 PROCEDURE — 45385 COLONOSCOPY W/LESION REMOVAL: CPT | Performed by: STUDENT IN AN ORGANIZED HEALTH CARE EDUCATION/TRAINING PROGRAM

## 2024-05-03 PROCEDURE — 7100000010 HC PHASE TWO TIME - EACH INCREMENTAL 1 MINUTE

## 2024-05-03 RX ORDER — PROPOFOL 10 MG/ML
INJECTION, EMULSION INTRAVENOUS AS NEEDED
Status: DISCONTINUED | OUTPATIENT
Start: 2024-05-03 | End: 2024-05-03

## 2024-05-03 RX ORDER — SODIUM CHLORIDE, SODIUM LACTATE, POTASSIUM CHLORIDE, CALCIUM CHLORIDE 600; 310; 30; 20 MG/100ML; MG/100ML; MG/100ML; MG/100ML
20 INJECTION, SOLUTION INTRAVENOUS CONTINUOUS
Status: DISCONTINUED | OUTPATIENT
Start: 2024-05-03 | End: 2024-05-04 | Stop reason: HOSPADM

## 2024-05-03 RX ORDER — DEXTROMETHORPHAN/PSEUDOEPHED 2.5-7.5/.8
DROPS ORAL AS NEEDED
Status: COMPLETED | OUTPATIENT
Start: 2024-05-03 | End: 2024-05-03

## 2024-05-03 RX ADMIN — SIMETHICONE 40 MG: 20 EMULSION ORAL at 09:55

## 2024-05-03 RX ADMIN — SODIUM CHLORIDE, POTASSIUM CHLORIDE, SODIUM LACTATE AND CALCIUM CHLORIDE 20 ML/HR: 600; 310; 30; 20 INJECTION, SOLUTION INTRAVENOUS at 09:40

## 2024-05-03 RX ADMIN — PROPOFOL 200 MG: 10 INJECTION, EMULSION INTRAVENOUS at 09:50

## 2024-05-03 RX ADMIN — PROPOFOL 100 MG: 10 INJECTION, EMULSION INTRAVENOUS at 10:00

## 2024-05-03 RX ADMIN — PROPOFOL 100 MG: 10 INJECTION, EMULSION INTRAVENOUS at 10:03

## 2024-05-03 SDOH — HEALTH STABILITY: MENTAL HEALTH: CURRENT SMOKER: 1

## 2024-05-03 ASSESSMENT — PAIN - FUNCTIONAL ASSESSMENT
PAIN_FUNCTIONAL_ASSESSMENT: 0-10

## 2024-05-03 ASSESSMENT — COLUMBIA-SUICIDE SEVERITY RATING SCALE - C-SSRS
2. HAVE YOU ACTUALLY HAD ANY THOUGHTS OF KILLING YOURSELF?: NO
6. HAVE YOU EVER DONE ANYTHING, STARTED TO DO ANYTHING, OR PREPARED TO DO ANYTHING TO END YOUR LIFE?: NO
1. IN THE PAST MONTH, HAVE YOU WISHED YOU WERE DEAD OR WISHED YOU COULD GO TO SLEEP AND NOT WAKE UP?: NO

## 2024-05-03 ASSESSMENT — PAIN SCALES - GENERAL
PAINLEVEL_OUTOF10: 0 - NO PAIN
PAIN_LEVEL: 0

## 2024-05-03 NOTE — ANESTHESIA POSTPROCEDURE EVALUATION
Patient: Daysi Resendiz    Procedure Summary       Date: 05/03/24 Room / Location: [facility]    Anesthesia Start: 0946 Anesthesia Stop: 1010    Procedure: COLONOSCOPY Diagnosis: History of colon polyps    Scheduled Providers: Yann Costello DO; Canelo Billingsley DO; Han Galarza RN; Elvia Burnham Responsible Provider: Canelo Billingsley DO    Anesthesia Type: MAC ASA Status: 3            Anesthesia Type: MAC    Vitals Value Taken Time   /53 05/03/24 1010   Temp 36.5 05/03/24 1010   Pulse 61 05/03/24 1010   Resp 18 05/03/24 1010   SpO2 100 05/03/24 1010       Anesthesia Post Evaluation    Patient location during evaluation: PACU  Patient participation: complete - patient participated  Level of consciousness: awake  Pain score: 0  Pain management: adequate  Airway patency: patent  Cardiovascular status: acceptable and stable  Respiratory status: acceptable  Hydration status: acceptable  Postoperative Nausea and Vomiting: none      No notable events documented.

## 2024-05-03 NOTE — ANESTHESIA PREPROCEDURE EVALUATION
Patient: Daysi Resendiz    Procedure Information       Date/Time: 05/03/24 1020    Scheduled providers: Yann Costello DO; Canelo Billingsley DO    Procedure: COLONOSCOPY    Location: [facility]            Relevant Problems   Cardiac   (+) Benign essential hypertension   (+) Hyperlipidemia      Pulmonary   (+) Shortness of breath on exertion      Neuro   (+) Anxiety   (+) Bilateral carotid artery stenosis   (+) Carotid artery stenosis      /Renal   (+) Recurrent urinary tract infection      Musculoskeletal   (+) Osteoarthritis of left knee      HEENT   (+) Acute sinus infection   (+) Sinus congestion      ID   (+) COVID-19 virus infection   (+) Recurrent urinary tract infection       Clinical information reviewed:                   NPO Detail:  No data recorded     Physical Exam    Airway  Mallampati: II     Cardiovascular - normal exam     Dental - normal exam     Pulmonary - normal exam     Abdominal - normal exam         Anesthesia Plan    History of general anesthesia?: yes  History of complications of general anesthesia?: no    ASA 3     MAC     The patient is a current smoker.  Patient was previously instructed to abstain from smoking on day of procedure.  Patient did not smoke on day of procedure.    intravenous induction   Anesthetic plan and risks discussed with patient.  Use of blood products discussed with patient who.

## 2024-05-03 NOTE — DISCHARGE INSTRUCTIONS
Patient Instructions after a Colonoscopy      The anesthetics, sedatives or narcotics which were given to you today will be acting in your body for the next 24 hours, so you might feel a little sleepy or groggy.  This feeling should slowly wear off. Carefully read and follow the instructions.     You received sedation today:  - Do not drive or operate any machinery or power tools of any kind.   - No alcoholic beverages today, not even beer or wine.  - Do not make any important decisions or sign any legal documents.  - No over the counter medications that contain alcohol or that may cause drowsiness.  - Do not make any important decisions or sign any legal documents.    While it is common to experience mild to moderate abdominal distention, gas, or belching after your procedure, if any of these symptoms occur following discharge from the GI Lab or within one week of having your procedure, call the [facility] to be advised whether a visit to your nearest Urgent Care or Emergency Department is indicated.  Take this paper with you if you go.     - If you develop an allergic reaction to the medications that were given during your procedure such as difficulty breathing, rash, hives, severe nausea, vomiting or lightheadedness.  - If you experience chest pain, shortness of breath, severe abdominal pain, fevers and chills.  -If you develop signs and symptoms of bleeding such as blood in your spit, if your stools turn black, tarry, or bloody  - If you have not urinated within 8 hours following your procedure.  - If your IV site becomes painful, red, inflamed, or looks infected.    If you received a biopsy/polypectomy/sphincterotomy the following instructions apply below:    __ Do not use Aspirin containing products, non-steroidal medications or anti-coagulants for one week following your procedure. (Examples of these types of medications are: Advil, Arthrotec, Aleve, Coumadin, Ecotrin, Heparin, Ibuprofen,  Indocin, Motrin, Naprosyn, Nuprin, Plavix, Vioxx, and Voltarin, or their generic forms.  This list is not all-inclusive.  Check with your physician or pharmacist before resuming medications.)   __ Eat a soft diet today.  Avoid foods that are poorly digested for the next 24 hours.  These foods would include: nuts, beans, lettuce, red meats, and fried foods. Start with liquids and advance your diet as tolerated, gradually work up to eating solids.   __ Do not have a Barium Study or Enema for one week.    Your physician recommends the additional following instructions:    -You have a contact number available for emergencies. The signs and symptoms of potential delayed complications were discussed with you. You may return to normal activities tomorrow.  -Resume your previous diet.  -Continue your present medications.   -We are waiting for your pathology results.  -Your physician has recommended a repeat colonoscopy (date to be determined after pending pathology results are reviewed) for surveillance based on pathology results.  -The findings and recommendations have been discussed with you.  -The findings and recommendations were discussed with your family.  - Please see Medication Reconciliation Form for new medication/medications prescribed.       If you experience any problems or have any questions following discharge from the GI Lab, please call:  Before 5p.m.  [phone]  After 5p.m.    [phone]    Nurse Signature                                                                        Date___________________                                                                            Patient/Responsible Party Signature                                        Date___________________High Fiber Diet    About this topic  Dietary fiber helps many illnesses. It can help you if you cannot have a bowel movement or if you have loose stools. Fiber can also lower your risk of diabetes and heart disease. Fiber can help  with weight loss by helping you feel ramirez after meals.  You can find fiber in fruits, vegetables, nuts and seeds, whole grains, and legumes. The fiber is the part of the plant food that your body cannot break down and absorb. It passes through your stomach, small bowel, colon, and out your body.  There are two kinds of fiber: Insoluble and soluble fiber. Insoluble fiber helps you pass foods through your digestive system. Insoluble fiber can help you with hard stools. Soluble fiber draws water in and turns it into a gel-like form making digestion slow down. Both are important.    What will the results be?  A high fiber diet can help you with bowel problems like stools that are too hard or too loose. It can also help prevent hemorrhoids and other colon problems. A high fiber diet can also help control your weight and lower blood sugar and cholesterol levels.  What changes to diet are needed?  The amount of fiber you need is based on your age, gender, and health.  Try to get 20 to 35 grams of fiber in your diet each day. Most people in the [place] only eat 15 grams of fiber daily.  Drink at least 8 cups (1920 mL) of fluid each day.  When is this diet used?  Your doctor may talk with you about this diet if you have belly problems.  Who should use this diet?  Older children, young people, and adults can have this diet.  Who should not use this diet?  Some people should not use this diet. Check with your doctor if you have:  Diverticulitis  Active Crohn's disease  Ulcerative colitis  Bowel inflammation  Certain types of GI surgery  Talk to your child's doctor before starting your child on a high fiber diet.  What foods are good to eat?  To get the most from fiber in your diet, eat a wide variety of high fiber foods. Some examples are:  Vegetables like:  Spinach  Peas  Artichoke  Sweet potatoes with skin  Broccoli  Fruits like:  Raspberries  Blueberries  Blackberries  Apples with skin  Dried fruits  Grains like:  Oat  bran  Barley  Whole wheat products  Wheat bran  Dried beans and nuts like:  Sunflower seeds  Almonds  Black beans  Chickpeas  What problems could happen?  Sudden increase of fiber intake can lead to gas, pain, fullness in your belly, and loose stools. Increase fiber gradually while drinking plenty of fluids.  Do not eat too much fiber. Your body will not take in vitamins and minerals as well if you eat too much fiber.  When do I need to call the doctor?  Health problem is not better or you are feeling worse.  Helpful tips  Start slow as you add more fiber to your diet. This may help prevent gas or cramps.  Try to eat the same amount of fiber each day. Aim to get your fiber from nutritious foods. Supplements do not offer the same benefits as food.  Read food labels with care to learn how much fiber is in the food you are eating.  If possible, do not peel fruits or vegetables before you eat them. Eating the peel gives you more fiber.  Where can I learn more?  Eat Right  https://www.eatright.org/food/vitamins-and-supplements/types-of-vitamins-and-nutrients/easy-ways-to-boost-fiber-in-your-daily-diet  Last Reviewed Date  2021-09-23

## 2024-05-03 NOTE — H&P
"Outpatient Hospital Procedure H&P    Patient Profile  Name Daysi Resendiz  Date of Birth 1950  MRN [id number]  PCP Apple Tello        Diagnosis: Abdominal pain, history of polyps.  Procedure(s):  Colonoscopy.    Allergies  Allergies   Allergen Reactions    Bee Venom Protein (Honey Bee) Anaphylaxis    Labetalol Other     \"RAPID HEARTBEAT\"    Amoxicillin Itching    Aspirin Hives    Influenza Virus Vaccine, Live Attenuated GI Upset    Influenza Virus Vaccines GI Upset    Losartan-Hydrochlorothiazide Other     DECREASE KIDNEY FUNCTION    Methylprednisolone Hives    Metoclopramide Other     raised B/P    Midazolam Other     had stroke    Pneumoc 13-Yanet Conj-Dip Cr(Pf) Other     RAISED BLOOD PRESSURE    Pneumococcal 23-Yanet Ps Vaccine Other     \"RAISED BLOOD PRESSURE\"    Pneumococcal 23-Valent Polysaccharide Vaccine Other     \"RAISED BLOOD PRESSURE\"    Sulfa (Sulfonamide Antibiotics) Other     \"FEELS LIKE BUNCH OF BEE STINGS\"       Past Medical History   Past Medical History:   Diagnosis Date    Raghu's disease (Multi)     Anemia     Arthritis     Asymptomatic menopausal state     Post-menopausal    Carotid artery stenosis     Cerebral infarction due to unspecified occlusion or stenosis of unspecified cerebral artery (Multi) 12/20/2021    Right pontine stroke    Cerebral vascular accident (Multi)     MILD LEFT SIDE WEAKNESS    CKD (chronic kidney disease)     STAGE 3A    Claustrophobia     COVID-19     VACCINATED    Decreased coordination     Frequency-urgency syndrome     Gait disturbance     Gout     H/O adrenal disorder     Hyperlipidemia     Hypertension     Other conditions influencing health status     Stroke syndrome    Other tear of lateral meniscus, current injury, unspecified knee, initial encounter 02/21/2020    Lateral meniscal tear    Personal history of other specified conditions 12/29/2021    History of headache    Personal history of other specified conditions 01/10/2022    History of " "fever    Personal history of other specified conditions 12/29/2021    History of fatigue    Personal history of transient ischemic attack (TIA), and cerebral infarction without residual deficits 12/20/2021    History of transient ischemic attack    Shortness of breath     Unilateral primary osteoarthritis, left knee 03/25/2020    Left knee DJD    Unspecified kidney failure     Renal failure    Urinary tract infection     Urine retention     Venous stasis     BILATERAL    Vertigo     Wears dentures        Medication Reviewed - yes  Prior to Admission medications    Medication Sig Start Date End Date Taking? Authorizing Provider   atorvastatin (Lipitor) 20 mg tablet Take 1 tablet (20 mg) by mouth once daily at bedtime. 12/15/23 12/14/24  Apple Tello MD   BD SafetyGlide Needle 25 gauge x 1\" needle USE WITH SOLUCORTEF FOR ADRENAL CRISIS. 12/13/23   Historical Provider, MD   clopidogrel (Plavix) 75 mg tablet Take 1 tablet (75 mg) by mouth once daily. 11/17/23   Apple Tello MD   cyanocobalamin, vitamin B-12, (VITAMIN B-12 INJ) Inject as directed if needed. EVERY 3-6 MONTHS WHEN NEEDED BASED OFF LAB WORK    Historical Provider, MD   hydroCHLOROthiazide (Microzide) 12.5 mg tablet Take 2 tablets (25 mg) by mouth 2 times a day. 4/5/24   Apple Tello MD   hydrocortisone (Cortef) 10 mg tablet Take 1.5 tablets (15 mg) by mouth once daily in the morning.  TAKE ONE AND A HALF TABLETS BY MOUTH IN THE MORNING THEN TAKE HALF A TABLET AT 3PM THEN TAKE HALF A TABLET AT BEDTIME  Patient taking differently: Take 1.5 tablets (15 mg) by mouth once daily in the morning.  TAKE ONE AND A HALF TABLETS BY MOUTH IN THE MORNING THEN TAKE HALF A TABLET AT 3PM 11/17/23   Apple Tello MD   metoprolol succinate XL (Toprol-XL) 25 mg 24 hr tablet Take 2 tablets (50 mg) by mouth once daily. 4/5/24   Apple Tello MD   Solu-CORTEF Act-O-Vial, PF, 100 mg/2 mL injection if needed. FOR " EMERGENCY 12/13/23   Historical Provider, MD       Physical Exam  Vitals:    05/03/24 0929   BP: 159/86   Pulse: 77   Resp: 17   Temp: 35.6 °C (96.1 °F)   SpO2: 99%      Weight There were no vitals filed for this visit.  BMI There is no height or weight on file to calculate BMI.    General: A&Ox3, NAD.  HEENT: AT/NC.   CV: RRR. No murmur.  Resp: CTA bilaterally. No wheezing, rhonchi or rales.   GI: Soft, NT/ND.   Extrem: No edema. Pulses intact.  Skin: No Jaundice.   Neuro: No focal deficits.   Psych: Normal mood and affect.        Sedation Plan: Deep Sedation.  Procedure Plan - pre-procedural (re)assesment completed by physician:  discharge/transfer patient when discharge criteria met    Yann Costello DO  5/3/2024 9:44 AM

## 2024-05-03 NOTE — Clinical Note
Patient tolerated procedure well. Appears comfortable with no complaints of pain. VS stable. Arousable prior to transport. Patient transported to [facility] via cart.  Report called per CRNA.  Handoff completed.

## 2024-05-06 ENCOUNTER — TELEPHONE (OUTPATIENT)
Dept: PRIMARY CARE | Facility: CLINIC | Age: 74
End: 2024-05-06

## 2024-05-06 ENCOUNTER — OFFICE VISIT (OUTPATIENT)
Dept: PRIMARY CARE | Facility: CLINIC | Age: 74
End: 2024-05-06
Payer: COMMERCIAL

## 2024-05-06 VITALS
TEMPERATURE: 97.9 F | SYSTOLIC BLOOD PRESSURE: 130 MMHG | OXYGEN SATURATION: 97 % | DIASTOLIC BLOOD PRESSURE: 70 MMHG | WEIGHT: 196.4 LBS | HEART RATE: 70 BPM | BODY MASS INDEX: 30.83 KG/M2 | HEIGHT: 67 IN

## 2024-05-06 DIAGNOSIS — I10 BENIGN ESSENTIAL HYPERTENSION: ICD-10-CM

## 2024-05-06 DIAGNOSIS — R10.9 LEFT FLANK PAIN: ICD-10-CM

## 2024-05-06 DIAGNOSIS — R10.10 PAIN OF UPPER ABDOMEN: ICD-10-CM

## 2024-05-06 DIAGNOSIS — R39.9 UTI SYMPTOMS: ICD-10-CM

## 2024-05-06 DIAGNOSIS — Z71.2 ENCOUNTER TO DISCUSS TEST RESULTS: ICD-10-CM

## 2024-05-06 DIAGNOSIS — R10.12 LEFT UPPER QUADRANT ABDOMINAL PAIN: ICD-10-CM

## 2024-05-06 DIAGNOSIS — R10.32 LLQ ABDOMINAL PAIN: Primary | ICD-10-CM

## 2024-05-06 LAB
APPEARANCE UR: ABNORMAL
BACTERIA #/AREA URNS AUTO: ABNORMAL /HPF
BILIRUB UR STRIP.AUTO-MCNC: NEGATIVE MG/DL
COLOR UR: YELLOW
GLUCOSE UR STRIP.AUTO-MCNC: NEGATIVE MG/DL
KETONES UR STRIP.AUTO-MCNC: NEGATIVE MG/DL
LEUKOCYTE ESTERASE UR QL STRIP.AUTO: ABNORMAL
NITRITE UR QL STRIP.AUTO: POSITIVE
PH UR STRIP.AUTO: 5 [PH]
PROT UR STRIP.AUTO-MCNC: NEGATIVE MG/DL
RBC # UR STRIP.AUTO: NEGATIVE /UL
RBC #/AREA URNS AUTO: ABNORMAL /HPF
SP GR UR STRIP.AUTO: 1.01
SQUAMOUS #/AREA URNS AUTO: ABNORMAL /HPF
UROBILINOGEN UR STRIP.AUTO-MCNC: <2 MG/DL
WBC #/AREA URNS AUTO: ABNORMAL /HPF

## 2024-05-06 PROCEDURE — 1036F TOBACCO NON-USER: CPT | Performed by: FAMILY MEDICINE

## 2024-05-06 PROCEDURE — 87086 URINE CULTURE/COLONY COUNT: CPT

## 2024-05-06 PROCEDURE — 1159F MED LIST DOCD IN RCRD: CPT | Performed by: FAMILY MEDICINE

## 2024-05-06 PROCEDURE — 1160F RVW MEDS BY RX/DR IN RCRD: CPT | Performed by: FAMILY MEDICINE

## 2024-05-06 PROCEDURE — 3075F SYST BP GE 130 - 139MM HG: CPT | Performed by: FAMILY MEDICINE

## 2024-05-06 PROCEDURE — 87186 SC STD MICRODIL/AGAR DIL: CPT

## 2024-05-06 PROCEDURE — 3078F DIAST BP <80 MM HG: CPT | Performed by: FAMILY MEDICINE

## 2024-05-06 PROCEDURE — 1157F ADVNC CARE PLAN IN RCRD: CPT | Performed by: FAMILY MEDICINE

## 2024-05-06 PROCEDURE — 81001 URINALYSIS AUTO W/SCOPE: CPT

## 2024-05-06 PROCEDURE — 1124F ACP DISCUSS-NO DSCNMKR DOCD: CPT | Performed by: FAMILY MEDICINE

## 2024-05-06 PROCEDURE — 99214 OFFICE O/P EST MOD 30 MIN: CPT | Performed by: FAMILY MEDICINE

## 2024-05-06 RX ORDER — CLOPIDOGREL BISULFATE 75 MG/1
75 TABLET ORAL DAILY
Qty: 90 TABLET | Refills: 1 | Status: SHIPPED | OUTPATIENT
Start: 2024-05-06

## 2024-05-06 RX ORDER — HYDROCHLOROTHIAZIDE 12.5 MG/1
25 TABLET ORAL 2 TIMES DAILY
Qty: 90 TABLET | Refills: 1 | Status: SHIPPED | OUTPATIENT
Start: 2024-05-06

## 2024-05-06 RX ORDER — CEPHALEXIN 500 MG/1
500 CAPSULE ORAL 2 TIMES DAILY
Qty: 14 CAPSULE | Refills: 0 | Status: SHIPPED | OUTPATIENT
Start: 2024-05-06 | End: 2024-05-13

## 2024-05-06 ASSESSMENT — PATIENT HEALTH QUESTIONNAIRE - PHQ9
SUM OF ALL RESPONSES TO PHQ9 QUESTIONS 1 AND 2: 0
2. FEELING DOWN, DEPRESSED OR HOPELESS: NOT AT ALL
1. LITTLE INTEREST OR PLEASURE IN DOING THINGS: NOT AT ALL

## 2024-05-06 NOTE — ASSESSMENT & PLAN NOTE
Patient will try to leave a urine sample for us to send out.    Will treat presumptively.  Antibiotic ordered.    Patient has allergy to PCN but has tolerated Cephalexin in the past.  Rx ordered.

## 2024-05-06 NOTE — PROGRESS NOTES
"Subjective   Chief complaint: Daysi Resendiz is a 74 y.o. female who presents for Results (Patient is in office today for a follow-up on results) and UTI (Patient advises that she may thinks that she may have  a UTI. She has pain in her L side and L lower back. Patient also advises that her urine has an odor. Patient is unsure if its due to the dye from her MRI and a colonoscopy in the last week. ).    HPI:  Here to follow-up on her recent testing results.  Recently had an MRI of her abdomen which revealed benign renal cysts.  No solid renal mass.  Radiologist recommended a follow-up in 2 years based on her age and lesion size her guidelines.  Thinks she may have a UTI because of the left flank symptoms.  She believes it feels like it did the last time she had a UTI.  Also had a colonoscopy and had some polyps biopsied.    Still having pain at the left upper and lower abdomen and around her left flank.  All day her pain is 5/10.  Upon waking and getting up it can be 10/10.  She takes 2 Tylenol Arthritis at 0700 and goes back to bed.  Thinks it can be arthritis with her pain.  Bowel movements are fine; has a diet from GI to increase her fiber.    No other new concerns.      Will see Dr. Baxter on May 20, 2024 for Rheumatology.  Will see Dr. Newsome for her Twiggs's also on that day.        Objective   /70 (BP Location: Left arm, Patient Position: Sitting, BP Cuff Size: Large adult)   Pulse 70   Temp 36.6 °C (97.9 °F) (Temporal)   Ht 1.702 m (5' 7\")   Wt 89.1 kg (196 lb 6.4 oz)   SpO2 97% Comment: RA  BMI 30.76 kg/m²   Physical Exam  General:  Alert, oriented, no acute distress  Eyes:  Sclerae white, PER, conjunctivae clear  ENT:  No nasal congestion.    Neck: Supple  Respiratory:  Normal breath sounds.  No wheezing, rhonchi nor crackles.  No dyspnea.  Cardiovascular:  S1 and S2 positive.  Regular rate and rhythm.  No gallops.  No murmurs.  Abdomen: area of discomfort is anterior superior left hip, left " "upper abdomen and around left flank. No splinting,  no guarding.  CNS:  No gross neurological deficits.  Gait within normal limits.    Psychiatric:  Affect is positive and appropriate.  No depression.  No anxiety.    Review of Systems   I have reviewed and reconciled the medication list with the patient today.   Current Outpatient Medications:     atorvastatin (Lipitor) 20 mg tablet, Take 1 tablet (20 mg) by mouth once daily at bedtime., Disp: 90 tablet, Rfl: 3    BD SafetyGlide Needle 25 gauge x 1\" needle, USE WITH SOLUCORTEF FOR ADRENAL CRISIS., Disp: , Rfl:     cyanocobalamin, vitamin B-12, (VITAMIN B-12 INJ), Inject as directed if needed. EVERY 3-6 MONTHS WHEN NEEDED BASED OFF LAB WORK, Disp: , Rfl:     diphenhydrAMINE-acetaminophen (Tylenol PM)  mg per tablet, Take 1 tablet by mouth as needed at bedtime for sleep., Disp: , Rfl:     hydrocortisone (Cortef) 10 mg tablet, Take 1.5 tablets (15 mg) by mouth once daily in the morning.  TAKE ONE AND A HALF TABLETS BY MOUTH IN THE MORNING THEN TAKE HALF A TABLET AT 3PM THEN TAKE HALF A TABLET AT BEDTIME (Patient taking differently: Take 1.5 tablets (15 mg) by mouth once daily in the morning.  TAKE ONE AND A HALF TABLETS BY MOUTH IN THE MORNING THEN TAKE HALF A TABLET AT 3PM), Disp: 270 tablet, Rfl: 1    metoprolol succinate XL (Toprol-XL) 25 mg 24 hr tablet, Take 2 tablets (50 mg) by mouth once daily., Disp: 90 tablet, Rfl: 3    Solu-CORTEF Act-O-Vial, PF, 100 mg/2 mL injection, if needed. FOR EMERGENCY, Disp: , Rfl:     cephalexin (Keflex) 500 mg capsule, Take 1 capsule (500 mg) by mouth 2 times a day for 7 days., Disp: 14 capsule, Rfl: 0    clopidogrel (Plavix) 75 mg tablet, Take 1 tablet (75 mg) by mouth once daily., Disp: 90 tablet, Rfl: 1    hydroCHLOROthiazide (Microzide) 12.5 mg tablet, Take 2 tablets (25 mg) by mouth 2 times a day., Disp: 90 tablet, Rfl: 1    Current Facility-Administered Medications:     methylPREDNISolone acetate (DEPO-Medrol) injection " 40 mg, 40 mg, intramuscular, Once, Apple Tello MD     Imaging:  MR abdomen w and wo IV contrast    Result Date: 5/3/2024  Interpreted By:  Reginald Stephens, STUDY: MR ABDOMEN W AND WO IV CONTRAST;  5/1/2024 10:45 am   INDICATION: Signs/Symptoms:RENAL MASS.   COMPARISON: CT abdomen and pelvis 03/19/2024   ACCESSION NUMBER(S): [id number]   ORDERING CLINICIAN: HYUN LÓPEZ   TECHNIQUE: MRI renal mass protocol pre and dynamically post contrast. 18 mL of Dotarem were administered intravenously without immediate complication.   FINDINGS: Kidneys: Multifocal parenchymal scarring bilaterally. Exophytic cyst in the right upper pole measuring 5.5 x 5.3 x 5.2 cm containing thin internal enhancing septa (Bosniak 2). Additional subcentimeter simple cysts bilaterally. No solid mass. No hydronephrosis.   Liver: No mass. Hepatic steatosis.   Biliary: No intrahepatic or extrahepatic bile duct dilation. No cholelithiasis or acute inflammation.   Spleen: No mass. No splenomegaly.   Pancreas: Cystic lesion in the pancreatic neck measuring 0.8 cm (series 3, image 14). No main duct dilation or enhancing nodular components.   Adrenals: Normal.   GI tract: No bowel wall thickening or dilation.   Lymph nodes: No lymphadenopathy.   Mesentery/peritoneum: No ascites or fluid collection.   Vasculature: No abdominal aortic aneurysm.   Bones/Lower chest: No suspicious marrow replacing process. L2 vertebral focal fat.No pleural effusion.       Benign renal cysts including dominant 5.5 cm right upper pole Bosniak 2 cyst with thin internal septa. No solid renal mass.   Pancreatic neck cystic lesion measuring 0.8 cm without main duct dilation, likely sidebranch IPMN. No worrisome features. Consider follow-up in 2 years based on lesion size and patient's age per ACR guidelines.   MACRO None   Signed by: Reginald Stephens 5/3/2024 1:58 PM Dictation workstation:   [id number]    Colonoscopy Screening; High Risk Patient    Result Date:  5/3/2024  Table formatting from the original result was not included. Impression Fair prep. 4 mm polyp in the cecum was removed with cold snare. Diverticulosis in the descending colon and sigmoid colon. Hemorrhoids. Findings One 4 mm sessile polyp in the cecum; performed cold snare with complete en bloc removal and retrieved specimen Multiple small and large diverticula in the descending colon and sigmoid colon Internal hemorrhoids observed during retroflexion  Recommendation  Await pathology results  Follow up with PCP  Follow up with me in clinic  Repeat Colonoscopy in 2 years  Daily bowel regimen. Two day prep for next colonoscopy.  Indication History of colon polyps Medications See Anesthesia Record. Preprocedure A history and physical has been performed, and patient medication allergies have been reviewed. The patient's tolerance of previous anesthesia has been reviewed. The risks and benefits of the procedure and the sedation options and risks were discussed with the patient. All questions were answered and informed consent obtained. Details of the Procedure The patient underwent monitored anesthesia care, which was administered by an anesthesia professional. The patient's blood pressure, ECG, ETCO2, heart rate, level of consciousness, oxygen and respirations were monitored throughout the procedure. A digital rectal exam was performed. The scope was introduced through the anus and advanced to the cecum. Retroflexion was performed in the rectum. The patient experienced no blood loss. The procedure was not difficult. The patient tolerated the procedure well. There were no apparent adverse events. Events Procedure Events Event Event Time ENDO SCOPE IN TIME 5/3/2024  9:52 AM ENDO CECUM REACHED 5/3/2024 10:01 AM ENDO SCOPE OUT TIME 5/3/2024 10:09 AM Specimens ID Type Source Tests Collected by Time 1 :  Tissue COLON -CECUM POLYP SURGICAL PATHOLOGY EXAM Yann Costello DO 5/3/2024 1004 Procedure Location [facility]  [facility] [address] [facility] [address] [phone] Referring Provider Yann Costello  [address] [place] [address] Procedure Provider Yann Costello DO        Labs reviewed:    Lab Results   Component Value Date    WBC 9.9 03/28/2024    HGB 14.1 03/28/2024    HCT 41.7 03/28/2024     03/28/2024    CHOL 153 05/10/2022    TRIG 113 05/10/2022    HDL 49.0 05/10/2022    ALT 11 02/08/2024    AST 14 02/08/2024     03/28/2024    K 4.8 03/28/2024     03/28/2024    CREATININE 1.25 (H) 03/28/2024    BUN 17 03/28/2024    CO2 28 03/28/2024    TSH 2.90 02/08/2024    INR 1.0 06/24/2020    HGBA1C 5.5 06/08/2021       Assessment/Plan   Problem List Items Addressed This Visit       Benign essential hypertension     Acceptable.  Continue present management.         Relevant Medications    clopidogrel (Plavix) 75 mg tablet    hydroCHLOROthiazide (Microzide) 12.5 mg tablet    Encounter to discuss test results     Reviewed lab/testing results with the patient and provided patient education regarding the results.           Left flank pain     Will treat presumptively for UTI.  UA, C+S ordered.         Left upper quadrant abdominal pain     Reviewed MRI result with patient.    Ordered by Dr. Dick.  Will request any additional information that he can give us after he reviews the results.         LLQ abdominal pain - Primary     Discomfort is at the anterior superior iliac area.           UTI symptoms     Patient will try to leave a urine sample for us to send out.    Will treat presumptively.  Antibiotic ordered.    Patient has allergy to PCN but has tolerated Cephalexin in the past.  Rx ordered.         Relevant Medications    cephalexin (Keflex) 500 mg capsule    Other Relevant Orders    Urinalysis with Reflex Microscopic    Urine Culture       Continue current medications as listed

## 2024-05-06 NOTE — TELEPHONE ENCOUNTER
Called and spoke with Windy at Dr. Dick office she advised that they did not get results yet of MR abdomen, will fax results to their office [phone] then they will call patient.

## 2024-05-06 NOTE — ASSESSMENT & PLAN NOTE
Reviewed MRI result with patient.    Ordered by Dr. Dick.  Will request any additional information that he can give us after he reviews the results.

## 2024-05-08 ENCOUNTER — APPOINTMENT (OUTPATIENT)
Dept: PRIMARY CARE | Facility: CLINIC | Age: 74
End: 2024-05-08
Payer: COMMERCIAL

## 2024-05-08 LAB
BACTERIA UR CULT: ABNORMAL
BACTERIA UR CULT: ABNORMAL

## 2024-05-08 NOTE — RESULT ENCOUNTER NOTE
Her chart says Cephalexin was ordered on Monday.  She should be able to pick it up if she hasn't started it yet.  It should take care of the infection.  If she feels it did not help, we can recheck a UA.

## 2024-05-09 ENCOUNTER — OFFICE VISIT (OUTPATIENT)
Dept: PRIMARY CARE | Facility: CLINIC | Age: 74
End: 2024-05-09
Payer: COMMERCIAL

## 2024-05-09 ENCOUNTER — APPOINTMENT (OUTPATIENT)
Dept: PRIMARY CARE | Facility: CLINIC | Age: 74
End: 2024-05-09
Payer: COMMERCIAL

## 2024-05-09 VITALS
WEIGHT: 199 LBS | HEART RATE: 65 BPM | SYSTOLIC BLOOD PRESSURE: 118 MMHG | DIASTOLIC BLOOD PRESSURE: 62 MMHG | BODY MASS INDEX: 31.23 KG/M2 | HEIGHT: 67 IN

## 2024-05-09 DIAGNOSIS — R39.9 UTI SYMPTOMS: ICD-10-CM

## 2024-05-09 DIAGNOSIS — I87.393 CHRONIC VENOUS HYPERTENSION WITH COMPLICATION INVOLVING BOTH SIDES: Primary | ICD-10-CM

## 2024-05-09 DIAGNOSIS — I87.8 VENOUS STASIS OF BOTH LOWER EXTREMITIES: ICD-10-CM

## 2024-05-09 DIAGNOSIS — E89.6: ICD-10-CM

## 2024-05-09 DIAGNOSIS — E66.3 OVERWEIGHT WITH BODY MASS INDEX (BMI) OF 28 TO 28.9 IN ADULT: ICD-10-CM

## 2024-05-09 DIAGNOSIS — I83.893 VARICOSE VEINS OF BOTH LEGS WITH EDEMA: ICD-10-CM

## 2024-05-09 DIAGNOSIS — R10.9 LEFT FLANK PAIN: ICD-10-CM

## 2024-05-09 PROBLEM — R40.4 STARING EPISODES: Status: RESOLVED | Noted: 2023-11-10 | Resolved: 2024-05-09

## 2024-05-09 PROCEDURE — 1157F ADVNC CARE PLAN IN RCRD: CPT | Performed by: INTERNAL MEDICINE

## 2024-05-09 PROCEDURE — 3074F SYST BP LT 130 MM HG: CPT | Performed by: INTERNAL MEDICINE

## 2024-05-09 PROCEDURE — 3078F DIAST BP <80 MM HG: CPT | Performed by: INTERNAL MEDICINE

## 2024-05-09 PROCEDURE — 1159F MED LIST DOCD IN RCRD: CPT | Performed by: INTERNAL MEDICINE

## 2024-05-09 PROCEDURE — 99214 OFFICE O/P EST MOD 30 MIN: CPT | Performed by: INTERNAL MEDICINE

## 2024-05-09 PROCEDURE — 1160F RVW MEDS BY RX/DR IN RCRD: CPT | Performed by: INTERNAL MEDICINE

## 2024-05-09 PROCEDURE — 1036F TOBACCO NON-USER: CPT | Performed by: INTERNAL MEDICINE

## 2024-05-09 ASSESSMENT — PATIENT HEALTH QUESTIONNAIRE - PHQ9
1. LITTLE INTEREST OR PLEASURE IN DOING THINGS: NOT AT ALL
SUM OF ALL RESPONSES TO PHQ9 QUESTIONS 1 AND 2: 0
2. FEELING DOWN, DEPRESSED OR HOPELESS: NOT AT ALL

## 2024-05-09 ASSESSMENT — LIFESTYLE VARIABLES
HAS A RELATIVE, FRIEND, DOCTOR, OR ANOTHER HEALTH PROFESSIONAL EXPRESSED CONCERN ABOUT YOUR DRINKING OR SUGGESTED YOU CUT DOWN: NO
SKIP TO QUESTIONS 9-10: 1
HOW MANY STANDARD DRINKS CONTAINING ALCOHOL DO YOU HAVE ON A TYPICAL DAY: 1 OR 2
AUDIT-C TOTAL SCORE: 1
HAVE YOU OR SOMEONE ELSE BEEN INJURED AS A RESULT OF YOUR DRINKING: NO
HOW OFTEN DO YOU HAVE SIX OR MORE DRINKS ON ONE OCCASION: NEVER
HOW OFTEN DO YOU HAVE A DRINK CONTAINING ALCOHOL: MONTHLY OR LESS
AUDIT TOTAL SCORE: 1

## 2024-05-09 NOTE — PROGRESS NOTES
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  She was scheduled to have appropriate procedures which image guided foam sclerotherapy in both legs but they were postponed because patient was undergoing some investigations due to her left flank pain and also to exclude abnormalities which included to rule out pathology in the pancreas.  She also follows with a nephrologist for her chronic kidney disease which is stable.    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  Patient's left flank pain is still present but much improved.  She has had some left leg pain and left leg cramps which are improved may be with the compression stockings also with conservative management.    At this time patient believes that she should be able to reschedule the procedures if her nephrologist approves it.  She will be seeing the nephrologist next week.    Symptoms:  bulging veins,~dilated veins,~leg pain,~leg swelling~and~muscle cramps. The patient is currently experiencing symptoms. Symptoms are located on both sides, the left side more than the right. The patient describes the pain as aching, and heavy.  Onset was gradual 20 year(s) ago. The symptoms occur intermittently. The episodes occur daily. She describes this as moderate in severity~and~worsening. Exacerbating factors:  leg dependency,~prolonged sitting~and~prolonged standing. Relieving factors:  elevation,~exercises~and~support stockings. Current treatment includes compression stockings, some walking and leg elevation. By report, there is fair compliance with treatment, fair tolerance of treatment and some symptom control. Initial  "diagnosis of varicose veins was More than 30 year(s) ago. Initial presentation included bulging veins and dilated veins. Since diagnosis the disease has been worsening. Recently, the disease has been worsening. Disease complications:  chronic edema~and some skin changes.  Pertinent medical history:  no deep venous thrombosis,~no hypercoagulable state,~no pulmonary embolism~and~no thrombophlebitis. Risk factors:  family history of varicose veins~and~obesity. She was previously evaluated by a colleague 20 year(s) ago. Presenting symptoms included bulging veins, dilated veins, leg pain and muscle cramps. Past treatment has included compression stockings, leg elevation, surgical excision and vein stripping.      ROS:  Respiratory:  No shortness of breath.  No cough, sinus congestion     Cardiovascular:     No chest pain.  Denies claudication.  No cyanosis.  Denies palpitations,     Neurologic:     No tingling/Numbness.  No loss of strength.     Social History:  Tobacco Use:  None    Current Outpatient Medications on File Prior to Visit   Medication Sig Dispense Refill    atorvastatin (Lipitor) 20 mg tablet Take 1 tablet (20 mg) by mouth once daily at bedtime. 90 tablet 3    BD SafetyGlide Needle 25 gauge x 1\" needle USE WITH SOLUCORTEF FOR ADRENAL CRISIS.      cephalexin (Keflex) 500 mg capsule Take 1 capsule (500 mg) by mouth 2 times a day for 7 days. 14 capsule 0    clopidogrel (Plavix) 75 mg tablet Take 1 tablet (75 mg) by mouth once daily. 90 tablet 1    cyanocobalamin, vitamin B-12, (VITAMIN B-12 INJ) Inject as directed if needed. EVERY 3-6 MONTHS WHEN NEEDED BASED OFF LAB WORK      diphenhydrAMINE-acetaminophen (Tylenol PM)  mg per tablet Take 1 tablet by mouth as needed at bedtime for sleep.      hydroCHLOROthiazide (Microzide) 12.5 mg tablet Take 2 tablets (25 mg) by mouth 2 times a day. (Patient taking differently: Take 2 tablets (25 mg) by mouth once daily.) 90 tablet 1    hydrocortisone (Cortef) 10 mg " "tablet Take 1.5 tablets (15 mg) by mouth once daily in the morning.  TAKE ONE AND A HALF TABLETS BY MOUTH IN THE MORNING THEN TAKE HALF A TABLET AT 3PM THEN TAKE HALF A TABLET AT BEDTIME (Patient taking differently: Take 1.5 tablets (15 mg) by mouth once daily in the morning.  TAKE ONE AND A HALF TABLETS BY MOUTH IN THE MORNING THEN TAKE HALF A TABLET AT 3PM) 270 tablet 1    metoprolol succinate XL (Toprol-XL) 25 mg 24 hr tablet Take 2 tablets (50 mg) by mouth once daily. 90 tablet 3    Solu-CORTEF Act-O-Vial, PF, 100 mg/2 mL injection if needed. FOR EMERGENCY      [DISCONTINUED] clopidogrel (Plavix) 75 mg tablet Take 1 tablet (75 mg) by mouth once daily. 90 tablet 1    [DISCONTINUED] hydroCHLOROthiazide (Microzide) 12.5 mg tablet Take 2 tablets (25 mg) by mouth 2 times a day. 90 tablet 1     Current Facility-Administered Medications on File Prior to Visit   Medication Dose Route Frequency Provider Last Rate Last Admin    methylPREDNISolone acetate (DEPO-Medrol) injection 40 mg  40 mg intramuscular Once Apple Tello MD            Allergies   Allergen Reactions    Bee Venom Protein (Honey Bee) Anaphylaxis    Labetalol Other     \"RAPID HEARTBEAT\"    Amoxicillin Itching    Aspirin Hives    Influenza Virus Vaccine, Live Attenuated GI Upset    Influenza Virus Vaccines GI Upset    Losartan-Hydrochlorothiazide Other     DECREASE KIDNEY FUNCTION    Methylprednisolone Hives    Metoclopramide Other     raised B/P    Midazolam Other     had stroke    Pneumoc 13-Yanet Conj-Dip Cr(Pf) Other     RAISED BLOOD PRESSURE    Pneumococcal 23-Yanet Ps Vaccine Other     \"RAISED BLOOD PRESSURE\"    Pneumococcal 23-Valent Polysaccharide Vaccine Other     \"RAISED BLOOD PRESSURE\"    Sulfa (Sulfonamide Antibiotics) Other     \"FEELS LIKE BUNCH OF BEE STINGS\"        Examination:    Visit Vitals  /62   Pulse 65   Ht 1.702 m (5' 7\")   Wt 90.3 kg (199 lb)   BMI 31.17 kg/m²   Smoking Status Former   BSA 2.07 m²        Normal-built, " well-nourished  with no apparent distress. Alert oriented  Skin:  Normal turgor.  No rash.  Head:  Normocephalic, atraumatic.  Eyes:  Pupils are equal, round,.  No pallor of conjunctivae.  Mouth has moist oral mucosa.  Neck:  Supple.  No JVD.  No clubbing, has peripheral osteoarthritis present  There is puffiness of joints left lateral ankle, left knee mostly on the lateral side noted.  Chest:  Vesicular breathing Bilaterally good air entry.  No wheezing.  No crackles.  Heart:  Regular rate and rhythm.  S1, S2 positive.  No murmur.  Extremities:  Bilaterally has chronic venous stasis present.  Bilaterally 2+ dorsalis pedis pulses.  No calf tenderness. Homans sign is negative.  Neuro Exam: No focal signs. Gait is normal.     Venous Exam:  Varicose veins in left calf [ present.  Varicose veins in left thigh [ present.  Varicose veins in right calf [ present.  Varicose veins in right thigh [ present.  Reticular veins in left calf [ present.  Reticular veins in left thigh [ present.  Reticular veins in right calf [ present.  Reticular veins in right thigh [ present.  Telangiectasias in left calf [ present.  Telangiectasias in left thigh [ present.  Telangiectasias in right calf [ present.  Telangiectasias in right thigh [ present.  Right leg trace edema [ present.  Left leg 1+ edema [ present.  Hyperpigmentation in left leg [ present.  Hyperpigmentation in right leg [ present.  Lipodermatosclerosis in right leg absent ].  Lipodermatosclerosis in left leg [ absent ].  Dermatitis in left leg [ present.  Dermatitis in right leg [ present.  Corona phlebectatica in left leg [ present.  Corona phlebectatica in right leg [ present.  Atrophe annie in left leg absent ].  Atrophe annie in right leg  absent ].  Ulcer(s) in left leg  absent ].  Ulcer(s) in right leg  absent ].     Right leg CEAP [id number]     Left leg CEAP C4racS       Diagnosis/ Problems    Problem List Items Addressed This Visit       Varicose veins of leg with  swelling    Venous stasis of both lower extremities    Chronic venous hypertension with complication involving both sides - Primary    Overweight with body mass index (BMI) of 28 to 28.9 in adult    UTI symptoms    Post-adrenalectomy adrenal insufficiency (Multi)    Left flank pain          Plan     Chronic venous insufficiency of bilateral lower extremities with other complications   Patient will benefit from adjunctive image guided foam sclerotherapy X 2 in each leg    Discussions   Patient's detail ultrasound evaluation including the illustration of the refluxing superficial venous system of both legs were reviewed in detail with the patient.  Patient has b tributaries reflux identified giving rise to the symptoms as we discussed in the past.  Because of the progressive nature of venous disease and patient's continued symptoms in spite of doing the conservative management including lifestyle modification and wearing the graduated compression stockings further options were discussed with the patient. Closure of the tributaries by ultrasound-guided foam sclerotherapy x 2 was discussed. Risks, benefits, goals and alternatives and reasonable expectations were discussed for at least 20 min. All risks were discussed, including, but not limited to hyperpigmentation, skin necrosis, recurrence/progression of the disease/symptoms, infection, DVT, SVT and all other possible complications. All questions were answered to patient satisfaction. Patients understands and wishes to proceed. Handouts were given to the patient regarding the procedures and also the explanation including the measures taken to prevent the possible complications  which includes wearing the graduated compression stockings immediately after procedure and overnight that day and also walking every hour about 10 minutes during her waking time on the day of the procedure.  She will continue to wear the graduated compression stockings during the daytime for  minimum 2 weeks and also she will be active including no traveling or prolonged nonambulatory status for 2 weeks to prevent DVT and other complications.    We discussed the recent documentation that the if the BMI is more than 40 the success of venous procedures are limited. I encouraged the patient that he should the try to improve his health by loosing weight if possible prior to the venous procedures. Given the patient's significant obesity with a BMI of 31.17 I advised the patient that she should work with the primary care physician and other appropriate consults to lose weight.    Because of patient's multiple allergies patient will have the Varithena sclerotherapy in both legs and both sessions which are being approved.    Patient will call us next week after she discuss her condition with the nephrologist to schedule the procedures.    Patient has recent diagnosis of UTI and she wanted me to review the culture sensitivity report which are reviewed and patient is on Keflex which she will continue and she will follow-up with the PCP for the reevaluation as appropriate.    Counseling.  The patient was counseled regarding instructions for management, risk factor reductions, prognosis, patient and family education, impressions, risks and benefits of treatment options and importance of compliance with treatment. Total time of encounter was 26 minutes and 21 minutes was spent counseling.

## 2024-05-14 LAB
LABORATORY COMMENT REPORT: NORMAL
PATH REPORT.FINAL DX SPEC: NORMAL
PATH REPORT.GROSS SPEC: NORMAL
PATH REPORT.TOTAL CANCER: NORMAL

## 2024-05-23 ENCOUNTER — TELEPHONE (OUTPATIENT)
Dept: PRIMARY CARE | Facility: CLINIC | Age: 74
End: 2024-05-23
Payer: COMMERCIAL

## 2024-05-23 DIAGNOSIS — R39.9 UTI SYMPTOMS: Primary | ICD-10-CM

## 2024-05-23 RX ORDER — NITROFURANTOIN 25; 75 MG/1; MG/1
100 CAPSULE ORAL 2 TIMES DAILY
Qty: 10 CAPSULE | Refills: 0 | Status: SHIPPED | OUTPATIENT
Start: 2024-05-23 | End: 2024-05-28

## 2024-05-23 NOTE — TELEPHONE ENCOUNTER
Patient calling to state that the antibiotic given to her for the UTI symptoms has not helped.  Patient states that she is still experiencing symptoms.  Patient states that her urine is cloudy and has a odor.  Patient is asking if there is another medication that could be called to her pharmacy.  Please advise

## 2024-06-18 ENCOUNTER — APPOINTMENT (OUTPATIENT)
Dept: CARDIOLOGY | Facility: CLINIC | Age: 74
End: 2024-06-18
Payer: COMMERCIAL

## 2024-07-02 ENCOUNTER — LAB (OUTPATIENT)
Dept: LAB | Facility: LAB | Age: 74
End: 2024-07-02
Payer: COMMERCIAL

## 2024-07-02 ENCOUNTER — TELEPHONE (OUTPATIENT)
Dept: PRIMARY CARE | Facility: CLINIC | Age: 74
End: 2024-07-02

## 2024-07-02 ENCOUNTER — APPOINTMENT (OUTPATIENT)
Dept: CARDIOLOGY | Facility: CLINIC | Age: 74
End: 2024-07-02
Payer: COMMERCIAL

## 2024-07-02 VITALS
BODY MASS INDEX: 31.55 KG/M2 | HEART RATE: 72 BPM | DIASTOLIC BLOOD PRESSURE: 64 MMHG | SYSTOLIC BLOOD PRESSURE: 118 MMHG | HEIGHT: 67 IN | WEIGHT: 201 LBS

## 2024-07-02 DIAGNOSIS — I65.23 BILATERAL CAROTID ARTERY STENOSIS: ICD-10-CM

## 2024-07-02 DIAGNOSIS — Z87.891 FORMER SMOKER: ICD-10-CM

## 2024-07-02 DIAGNOSIS — R39.9 UTI SYMPTOMS: Primary | ICD-10-CM

## 2024-07-02 DIAGNOSIS — I10 BENIGN ESSENTIAL HYPERTENSION: ICD-10-CM

## 2024-07-02 DIAGNOSIS — N30.00 ACUTE CYSTITIS WITHOUT HEMATURIA: ICD-10-CM

## 2024-07-02 DIAGNOSIS — E78.2 MIXED HYPERLIPIDEMIA: ICD-10-CM

## 2024-07-02 LAB
ANION GAP SERPL CALC-SCNC: 13 MMOL/L (ref 10–20)
BUN SERPL-MCNC: 25 MG/DL (ref 6–23)
CALCIUM SERPL-MCNC: 9.9 MG/DL (ref 8.6–10.3)
CHLORIDE SERPL-SCNC: 104 MMOL/L (ref 98–107)
CO2 SERPL-SCNC: 28 MMOL/L (ref 21–32)
CREAT SERPL-MCNC: 1.22 MG/DL (ref 0.5–1.05)
EGFRCR SERPLBLD CKD-EPI 2021: 46 ML/MIN/1.73M*2
GLUCOSE SERPL-MCNC: 120 MG/DL (ref 74–99)
POTASSIUM SERPL-SCNC: 4.1 MMOL/L (ref 3.5–5.3)
SODIUM SERPL-SCNC: 141 MMOL/L (ref 136–145)

## 2024-07-02 PROCEDURE — 1036F TOBACCO NON-USER: CPT | Performed by: INTERNAL MEDICINE

## 2024-07-02 PROCEDURE — 80048 BASIC METABOLIC PNL TOTAL CA: CPT

## 2024-07-02 PROCEDURE — 99214 OFFICE O/P EST MOD 30 MIN: CPT | Performed by: INTERNAL MEDICINE

## 2024-07-02 PROCEDURE — 3078F DIAST BP <80 MM HG: CPT | Performed by: INTERNAL MEDICINE

## 2024-07-02 PROCEDURE — 1157F ADVNC CARE PLAN IN RCRD: CPT | Performed by: INTERNAL MEDICINE

## 2024-07-02 PROCEDURE — 1159F MED LIST DOCD IN RCRD: CPT | Performed by: INTERNAL MEDICINE

## 2024-07-02 PROCEDURE — 36415 COLL VENOUS BLD VENIPUNCTURE: CPT

## 2024-07-02 PROCEDURE — 3074F SYST BP LT 130 MM HG: CPT | Performed by: INTERNAL MEDICINE

## 2024-07-02 RX ORDER — DEXTROMETHORPHAN HYDROBROMIDE, GUAIFENESIN 5; 100 MG/5ML; MG/5ML
1300 LIQUID ORAL DAILY
COMMUNITY

## 2024-07-02 RX ORDER — NITROFURANTOIN 25; 75 MG/1; MG/1
100 CAPSULE ORAL 2 TIMES DAILY
Qty: 10 CAPSULE | Refills: 0 | Status: SHIPPED | OUTPATIENT
Start: 2024-07-02 | End: 2024-07-07

## 2024-07-02 ASSESSMENT — ENCOUNTER SYMPTOMS
NEUROLOGICAL NEGATIVE: 1
RESPIRATORY NEGATIVE: 1
CARDIOVASCULAR NEGATIVE: 1
CONSTITUTIONAL NEGATIVE: 1

## 2024-07-02 NOTE — TELEPHONE ENCOUNTER
The patient called the office and stated that she had seen the urologist at [facility]. Results are in her chart. She states she has another UTI and her urologist wanted her to follow up with you to get a referral for infectious disease doctor and antibiotics for the UTI. The patient states her lower back is still hurting. Please advise .

## 2024-07-02 NOTE — PROGRESS NOTES
CARDIOLOGY OFFICE VISIT      CHIEF COMPLAINT  Chief Complaint   Patient presents with    Follow-up     1 yr        HISTORY OF PRESENT ILLNESS  Daysi Resendiz is a 74 y.o. year old female patient with history of [place]'s disease, on chronic steroid replacement, hypertension and CKD.  She has some intermittent chest pain for which she had a stress test in September 2022 that was normal.  Echo shows normal LV function with mild mitral and tricuspid regurgitation.  Carotid Dopplers showed less than 50% stenosis in both internal carotid arteries.  She states she has been doing well was recently diagnosed with some mass on the upper pole of the kidney blood pressure has been running high for which she was started on HCTZ which she has been tolerating.  She denies orthopnea proximal nocturnal dyspnea.    ASSESSMENT AND PLAN  1.  Chest pain: This appears to be atypical chest pain with normal stress test and no further recurrences.  2.  History of recurrent TIA for which she is on chronic Plavix therapy and follow-up with her primary care physician.  3.  Hypertension: Blood pressure is well-controlled on current medications.  Will repeat a BMP today since she has not had repeat since she was started on the HCTZ.  4.  [place]'s disease: On chronic steroid replacement therapy.    Problem List Items Addressed This Visit       Benign essential hypertension    Bilateral carotid artery stenosis    Hyperlipidemia    Former smoker    BMI 31.0-31.9,adult       Recent Cardiovascular Testing:    Echo-  Stress-  Cath-  Carotid Ultrasound-    Past Medical History  Past Medical History:   Diagnosis Date    [place]'s disease (Multi)     Anemia     Arthritis     Asymptomatic menopausal state     Post-menopausal    Carotid artery stenosis     Cerebral infarction due to unspecified occlusion or stenosis of unspecified cerebral artery (Multi) 12/20/2021    Right pontine stroke    Cerebral vascular accident (Multi)     MILD LEFT SIDE WEAKNESS     CKD (chronic kidney disease)     STAGE 3A    Claustrophobia     COVID-19     VACCINATED    Decreased coordination     Frequency-urgency syndrome     Gait disturbance     Gout     H/O adrenal disorder     Hyperlipidemia     Hypertension     Other conditions influencing health status     Stroke syndrome    Other tear of lateral meniscus, current injury, unspecified knee, initial encounter 02/21/2020    Lateral meniscal tear    Personal history of other specified conditions 12/29/2021    History of headache    Personal history of other specified conditions 01/10/2022    History of fever    Personal history of other specified conditions 12/29/2021    History of fatigue    Personal history of transient ischemic attack (TIA), and cerebral infarction without residual deficits 12/20/2021    History of transient ischemic attack    Shortness of breath     Unilateral primary osteoarthritis, left knee 03/25/2020    Left knee DJD    Unspecified kidney failure     Renal failure    Urinary tract infection     Urine retention     Venous stasis     BILATERAL    Vertigo     Wears dentures        Social History  Social History     Tobacco Use    Smoking status: Former     Types: Cigarettes    Smokeless tobacco: Never   Vaping Use    Vaping status: Never Used   Substance Use Topics    Alcohol use: Not Currently    Drug use: Never       Family History     Family History   Problem Relation Name Age of Onset    Hypertension Mother      Atrial fibrillation Mother      Other (pacemaker) Mother      Heart failure Mother      Thyroid disease Mother      Thyroid disease Father      Hypertension Father      Diabetes Father      Lymphoma Father      Skin cancer Father      Colon cancer Sister      Hypertension Brother      Other (bladder cancer) Brother      Coronary artery disease Brother      Thyroid disease Daughter      Other (genital cancer) Paternal Grandmother          Allergies:  Allergies   Allergen Reactions    Bee Venom Protein  "(Honey Bee) Anaphylaxis    Labetalol Other     \"RAPID HEARTBEAT\"    Amoxicillin Itching    Aspirin Hives    Influenza Virus Vaccine, Live Attenuated GI Upset    Influenza Virus Vaccines GI Upset    Losartan-Hydrochlorothiazide Other     DECREASE KIDNEY FUNCTION    Methylprednisolone Hives    Metoclopramide Other     raised B/P    Midazolam Other     had stroke    Pneumoc 13-Yanet Conj-Dip Cr(Pf) Other     RAISED BLOOD PRESSURE    Pneumococcal 23-Yanet Ps Vaccine Other     \"RAISED BLOOD PRESSURE\"    Pneumococcal 23-Valent Polysaccharide Vaccine Other     \"RAISED BLOOD PRESSURE\"    Sulfa (Sulfonamide Antibiotics) Other     \"FEELS LIKE BUNCH OF BEE STINGS\"        Outpatient Medications:  Current Outpatient Medications   Medication Instructions    acetaminophen (TYLENOL 8 HOUR) 1,300 mg, oral, Daily, Do not crush, chew, or split.  Take in the morning.    atorvastatin (LIPITOR) 20 mg, oral, Nightly    BD SafetyGlide Needle 25 gauge x 1\" needle USE WITH SOLUCORTEF FOR ADRENAL CRISIS.    clopidogrel (PLAVIX) 75 mg, oral, Daily    cyanocobalamin, vitamin B-12, (VITAMIN B-12 INJ) injection, As needed, EVERY 3-6 MONTHS WHEN NEEDED BASED OFF LAB WORK    diphenhydrAMINE-acetaminophen (Tylenol PM)  mg per tablet 1 tablet, oral, Nightly PRN    hydroCHLOROthiazide (MICROZIDE) 25 mg, oral, 2 times daily    hydrocortisone (CORTEF) 15 mg, oral, Every morning,  TAKE ONE AND A HALF TABLETS BY MOUTH IN THE MORNING THEN TAKE HALF A TABLET AT 3PM THEN TAKE HALF A TABLET AT BEDTIME    metoprolol succinate XL (TOPROL-XL) 50 mg, oral, Daily    Solu-CORTEF Act-O-Vial, PF, 100 mg/2 mL injection if needed. FOR EMERGENCY        Recent Lab Results:    CBC:   Lab Results   Component Value Date    WBC 9.9 03/28/2024    RBC 4.57 03/28/2024    HGB 14.1 03/28/2024    HCT 41.7 03/28/2024     03/28/2024        CMP:    Lab Results   Component Value Date     03/28/2024    K 4.8 03/28/2024     03/28/2024    CO2 28 03/28/2024    BUN 17 " "03/28/2024    CREATININE 1.25 (H) 03/28/2024    GLUCOSE 102 (H) 03/28/2024    CALCIUM 10.6 (H) 03/28/2024       Magnesium:    No results found for: \"MG\"    Lipid Profile:    Lab Results   Component Value Date    TRIG 113 05/10/2022    HDL 49.0 05/10/2022       TSH:    Lab Results   Component Value Date    TSH 2.90 02/08/2024       BNP:   Lab Results   Component Value Date     (H) 06/24/2020        PT/INR:    Lab Results   Component Value Date    PROTIME 11.9 06/24/2020    INR 1.0 06/24/2020       HgBA1c:    Lab Results   Component Value Date    HGBA1C 5.5 06/08/2021       BMP:  Lab Results   Component Value Date     03/28/2024     02/08/2024     10/23/2023    K 4.8 03/28/2024    K 4.2 02/08/2024    K 4.2 10/23/2023     03/28/2024     02/08/2024     10/23/2023    CO2 28 03/28/2024    CO2 30 02/08/2024    CO2 30 10/23/2023    BUN 17 03/28/2024    BUN 21 02/08/2024    BUN 23 10/23/2023    CREATININE 1.25 (H) 03/28/2024    CREATININE 1.28 (H) 02/08/2024    CREATININE 1.18 (H) 10/23/2023       CBC:  Lab Results   Component Value Date    WBC 9.9 03/28/2024    WBC 9.3 02/08/2024    WBC 9.0 09/18/2023    WBC 8.1 07/19/2023    WBC 12.5 (H) 05/17/2023    RBC 4.57 03/28/2024    RBC 4.53 02/08/2024    RBC 4.40 09/18/2023    RBC 4.27 07/19/2023    RBC 4.57 05/17/2023    HGB 14.1 03/28/2024    HGB 13.6 02/08/2024    HGB 13.5 09/18/2023    HGB 13.0 07/19/2023    HGB 14.0 05/17/2023    HCT 41.7 03/28/2024    HCT 40.9 02/08/2024    HCT 40.8 09/18/2023    HCT 39.0 07/19/2023    HCT 41.8 05/17/2023    MCV 91 03/28/2024    MCV 90 02/08/2024    MCV 93 09/18/2023    MCV 91 07/19/2023    MCV 91 05/17/2023    MCH 30.9 03/28/2024    MCH 30.0 02/08/2024    MCHC 33.8 03/28/2024    MCHC 33.3 02/08/2024    MCHC 33.1 09/18/2023    MCHC 33.3 07/19/2023    MCHC 33.5 05/17/2023    RDW 13.8 03/28/2024    RDW 13.4 02/08/2024    RDW 13.1 09/18/2023    RDW 13.5 07/19/2023    RDW 13.3 05/17/2023     " "03/28/2024     02/08/2024     09/18/2023     07/19/2023     05/17/2023       Cardiac Enzymes:    No results found for: \"TROPHS\"    Hepatic Function Panel:    Lab Results   Component Value Date    ALKPHOS 70 02/08/2024    ALT 11 02/08/2024    AST 14 02/08/2024    PROT 6.6 02/08/2024    BILITOT 0.6 02/08/2024    BILIDIR 0.1 11/10/2020         REVIEW OF SYSTEMS  Review of Systems   Constitutional: Negative.   Cardiovascular: Negative.    Respiratory: Negative.     Neurological: Negative.        VITALS  Vitals:    07/02/24 1208   BP: 118/64   Pulse: 72     Wt Readings from Last 4 Encounters:   07/02/24 91.2 kg (201 lb)   05/09/24 90.3 kg (199 lb)   05/06/24 89.1 kg (196 lb 6.4 oz)   04/05/24 89.6 kg (197 lb 9.6 oz)       PHYSICAL EXAM  Constitutional:       Appearance: Healthy appearance. Not in distress.   Eyes:      Conjunctiva/sclera: Conjunctivae normal.      Pupils: Pupils are equal, round, and reactive to light.   Neck:      Vascular: No JVR. JVD normal.   Pulmonary:      Effort: Pulmonary effort is normal.      Breath sounds: Normal breath sounds. No wheezing. No rhonchi. No rales.   Chest:      Chest wall: Not tender to palpatation.   Cardiovascular:      PMI at left midclavicular line. Normal rate. Regular rhythm. Normal S1. Normal S2.       Murmurs: There is no murmur.      No gallop.  No click. No rub.   Pulses:     Intact distal pulses.   Edema:     Peripheral edema absent.   Abdominal:      Tenderness: There is no abdominal tenderness.   Musculoskeletal: Normal range of motion.         General: No tenderness.      Cervical back: Normal range of motion. Skin:     General: Skin is warm and dry.   Neurological:      General: No focal deficit present.      Mental Status: Alert and oriented to person, place and time.             "

## 2024-07-02 NOTE — TELEPHONE ENCOUNTER
I ordered the antibiotic at her pharmacy.  We can have a virtual visit by phone so I can document the reason for the referral please.  Can be double booked.

## 2024-07-02 NOTE — PATIENT INSTRUCTIONS
Continue same medications and treatments.   Patient educated on proper medication use.   Patient educated on risk factor modification.   Please bring any lab results from other providers / physicians to your next appointment.     Please bring all medicines, vitamins, and herbal supplements with you when you come to the office.     Prescriptions will not be filled unless you are compliant with your follow up appointments or have a follow up appointment scheduled as per instruction of your physician. Refills should be requested at the time of your visit.  Bmp today  1 year visit

## 2024-07-03 NOTE — TELEPHONE ENCOUNTER
Patient aware and she did receive the antibiotics . She will discuss the order for infectious disease doctor at her next office visit in August soi she does not have to do 2 appointments . She states she is okay waiting .

## 2024-07-22 DIAGNOSIS — I10 BENIGN ESSENTIAL HYPERTENSION: ICD-10-CM

## 2024-07-28 RX ORDER — HYDROCHLOROTHIAZIDE 12.5 MG/1
12.5 TABLET ORAL 2 TIMES DAILY
Qty: 90 TABLET | Refills: 1 | Status: SHIPPED | OUTPATIENT
Start: 2024-07-28

## 2024-08-05 ENCOUNTER — APPOINTMENT (OUTPATIENT)
Dept: PRIMARY CARE | Facility: CLINIC | Age: 74
End: 2024-08-05
Payer: COMMERCIAL

## 2024-08-12 DIAGNOSIS — I10 BENIGN ESSENTIAL HYPERTENSION: ICD-10-CM

## 2024-08-12 RX ORDER — HYDROCHLOROTHIAZIDE 12.5 MG/1
12.5 TABLET ORAL 2 TIMES DAILY
Qty: 180 TABLET | Refills: 1 | Status: SHIPPED | OUTPATIENT
Start: 2024-08-12

## 2024-08-23 ENCOUNTER — OFFICE VISIT (OUTPATIENT)
Dept: PRIMARY CARE | Facility: CLINIC | Age: 74
End: 2024-08-23
Payer: COMMERCIAL

## 2024-08-23 VITALS
OXYGEN SATURATION: 97 % | WEIGHT: 199 LBS | BODY MASS INDEX: 31.17 KG/M2 | SYSTOLIC BLOOD PRESSURE: 132 MMHG | TEMPERATURE: 96.1 F | DIASTOLIC BLOOD PRESSURE: 80 MMHG | HEART RATE: 70 BPM

## 2024-08-23 DIAGNOSIS — R39.9 URINARY SYMPTOM OR SIGN: ICD-10-CM

## 2024-08-23 DIAGNOSIS — E27.1 ADDISON'S DISEASE (MULTI): ICD-10-CM

## 2024-08-23 DIAGNOSIS — N12 PYELONEPHRITIS: Primary | ICD-10-CM

## 2024-08-23 LAB
APPEARANCE UR: CLEAR
BACTERIA #/AREA URNS AUTO: ABNORMAL /HPF
BILIRUB UR STRIP.AUTO-MCNC: NEGATIVE MG/DL
COLOR UR: ABNORMAL
GLUCOSE UR STRIP.AUTO-MCNC: NORMAL MG/DL
KETONES UR STRIP.AUTO-MCNC: NEGATIVE MG/DL
LEUKOCYTE ESTERASE UR QL STRIP.AUTO: ABNORMAL
MUCOUS THREADS #/AREA URNS AUTO: ABNORMAL /LPF
NITRITE UR QL STRIP.AUTO: ABNORMAL
PH UR STRIP.AUTO: 6 [PH]
POC APPEARANCE, URINE: ABNORMAL
POC BILIRUBIN, URINE: NEGATIVE
POC BLOOD, URINE: ABNORMAL
POC COLOR, URINE: ABNORMAL
POC GLUCOSE, URINE: NEGATIVE MG/DL
POC KETONES, URINE: ABNORMAL MG/DL
POC LEUKOCYTES, URINE: ABNORMAL
POC NITRITE,URINE: POSITIVE
POC PH, URINE: 5 PH
POC PROTEIN, URINE: ABNORMAL MG/DL
POC SPECIFIC GRAVITY, URINE: 1.01
POC UROBILINOGEN, URINE: 0.2 EU/DL
PROT UR STRIP.AUTO-MCNC: NEGATIVE MG/DL
RBC # UR STRIP.AUTO: NEGATIVE /UL
RBC #/AREA URNS AUTO: ABNORMAL /HPF
SP GR UR STRIP.AUTO: 1.01
SQUAMOUS #/AREA URNS AUTO: ABNORMAL /HPF
UROBILINOGEN UR STRIP.AUTO-MCNC: NORMAL MG/DL
WBC #/AREA URNS AUTO: ABNORMAL /HPF

## 2024-08-23 PROCEDURE — 99214 OFFICE O/P EST MOD 30 MIN: CPT | Performed by: INTERNAL MEDICINE

## 2024-08-23 PROCEDURE — 1123F ACP DISCUSS/DSCN MKR DOCD: CPT | Performed by: INTERNAL MEDICINE

## 2024-08-23 PROCEDURE — 1159F MED LIST DOCD IN RCRD: CPT | Performed by: INTERNAL MEDICINE

## 2024-08-23 PROCEDURE — 1157F ADVNC CARE PLAN IN RCRD: CPT | Performed by: INTERNAL MEDICINE

## 2024-08-23 PROCEDURE — 81001 URINALYSIS AUTO W/SCOPE: CPT

## 2024-08-23 PROCEDURE — 87086 URINE CULTURE/COLONY COUNT: CPT

## 2024-08-23 PROCEDURE — 3079F DIAST BP 80-89 MM HG: CPT | Performed by: INTERNAL MEDICINE

## 2024-08-23 PROCEDURE — 81002 URINALYSIS NONAUTO W/O SCOPE: CPT | Performed by: INTERNAL MEDICINE

## 2024-08-23 PROCEDURE — 3075F SYST BP GE 130 - 139MM HG: CPT | Performed by: INTERNAL MEDICINE

## 2024-08-23 PROCEDURE — 1158F ADVNC CARE PLAN TLK DOCD: CPT | Performed by: INTERNAL MEDICINE

## 2024-08-23 PROCEDURE — 87186 SC STD MICRODIL/AGAR DIL: CPT

## 2024-08-23 RX ORDER — CIPROFLOXACIN 500 MG/1
500 TABLET ORAL 2 TIMES DAILY
Qty: 14 TABLET | Refills: 0 | Status: SHIPPED | OUTPATIENT
Start: 2024-08-23 | End: 2024-08-28 | Stop reason: HOSPADM

## 2024-08-23 RX ORDER — GABAPENTIN 100 MG/1
100 CAPSULE ORAL NIGHTLY
COMMUNITY

## 2024-08-23 ASSESSMENT — ENCOUNTER SYMPTOMS: DEPRESSION: 0

## 2024-08-24 ENCOUNTER — HOSPITAL ENCOUNTER (INPATIENT)
Facility: HOSPITAL | Age: 74
DRG: 690 | End: 2024-08-24
Attending: EMERGENCY MEDICINE | Admitting: FAMILY MEDICINE
Payer: COMMERCIAL

## 2024-08-24 ENCOUNTER — APPOINTMENT (OUTPATIENT)
Dept: RADIOLOGY | Facility: HOSPITAL | Age: 74
DRG: 690 | End: 2024-08-24
Payer: COMMERCIAL

## 2024-08-24 DIAGNOSIS — R10.31 RIGHT LOWER QUADRANT ABDOMINAL PAIN: Primary | ICD-10-CM

## 2024-08-24 DIAGNOSIS — R39.9 UTI SYMPTOMS: ICD-10-CM

## 2024-08-24 DIAGNOSIS — I10 BENIGN ESSENTIAL HYPERTENSION: ICD-10-CM

## 2024-08-24 DIAGNOSIS — N30.90 CYSTITIS: ICD-10-CM

## 2024-08-24 LAB
ALBUMIN SERPL BCP-MCNC: 4.2 G/DL (ref 3.4–5)
ALP SERPL-CCNC: 60 U/L (ref 33–136)
ALT SERPL W P-5'-P-CCNC: 10 U/L (ref 7–45)
ANION GAP SERPL CALC-SCNC: 13 MMOL/L (ref 10–20)
APPEARANCE UR: CLEAR
AST SERPL W P-5'-P-CCNC: 13 U/L (ref 9–39)
BASOPHILS # BLD AUTO: 0.07 X10*3/UL (ref 0–0.1)
BASOPHILS NFR BLD AUTO: 0.8 %
BILIRUB SERPL-MCNC: 0.6 MG/DL (ref 0–1.2)
BILIRUB UR STRIP.AUTO-MCNC: NEGATIVE MG/DL
BUN SERPL-MCNC: 23 MG/DL (ref 6–23)
CALCIUM SERPL-MCNC: 9.9 MG/DL (ref 8.6–10.3)
CHLORIDE SERPL-SCNC: 102 MMOL/L (ref 98–107)
CO2 SERPL-SCNC: 25 MMOL/L (ref 21–32)
COLOR UR: ABNORMAL
CREAT SERPL-MCNC: 1.08 MG/DL (ref 0.5–1.05)
EGFRCR SERPLBLD CKD-EPI 2021: 53 ML/MIN/1.73M*2
EOSINOPHIL # BLD AUTO: 0.17 X10*3/UL (ref 0–0.4)
EOSINOPHIL NFR BLD AUTO: 1.9 %
ERYTHROCYTE [DISTWIDTH] IN BLOOD BY AUTOMATED COUNT: 12.9 % (ref 11.5–14.5)
GLUCOSE SERPL-MCNC: 102 MG/DL (ref 74–99)
GLUCOSE UR STRIP.AUTO-MCNC: NORMAL MG/DL
HCT VFR BLD AUTO: 39.8 % (ref 36–46)
HGB BLD-MCNC: 13.4 G/DL (ref 12–16)
HOLD SPECIMEN: NORMAL
IMM GRANULOCYTES # BLD AUTO: 0.04 X10*3/UL (ref 0–0.5)
IMM GRANULOCYTES NFR BLD AUTO: 0.4 % (ref 0–0.9)
KETONES UR STRIP.AUTO-MCNC: NEGATIVE MG/DL
LACTATE SERPL-SCNC: 1.6 MMOL/L (ref 0.4–2)
LEUKOCYTE ESTERASE UR QL STRIP.AUTO: ABNORMAL
LYMPHOCYTES # BLD AUTO: 2.63 X10*3/UL (ref 0.8–3)
LYMPHOCYTES NFR BLD AUTO: 29.3 %
MAGNESIUM SERPL-MCNC: 1.52 MG/DL (ref 1.6–2.4)
MCH RBC QN AUTO: 30.7 PG (ref 26–34)
MCHC RBC AUTO-ENTMCNC: 33.7 G/DL (ref 32–36)
MCV RBC AUTO: 91 FL (ref 80–100)
MONOCYTES # BLD AUTO: 0.65 X10*3/UL (ref 0.05–0.8)
MONOCYTES NFR BLD AUTO: 7.2 %
NEUTROPHILS # BLD AUTO: 5.41 X10*3/UL (ref 1.6–5.5)
NEUTROPHILS NFR BLD AUTO: 60.4 %
NITRITE UR QL STRIP.AUTO: ABNORMAL
NRBC BLD-RTO: 0 /100 WBCS (ref 0–0)
PH UR STRIP.AUTO: 5.5 [PH]
PLATELET # BLD AUTO: 275 X10*3/UL (ref 150–450)
POTASSIUM SERPL-SCNC: 4.4 MMOL/L (ref 3.5–5.3)
PROT SERPL-MCNC: 6.9 G/DL (ref 6.4–8.2)
PROT UR STRIP.AUTO-MCNC: NEGATIVE MG/DL
RBC # BLD AUTO: 4.37 X10*6/UL (ref 4–5.2)
RBC # UR STRIP.AUTO: NEGATIVE /UL
RBC #/AREA URNS AUTO: ABNORMAL /HPF
SODIUM SERPL-SCNC: 136 MMOL/L (ref 136–145)
SP GR UR STRIP.AUTO: 1.02
SQUAMOUS #/AREA URNS AUTO: ABNORMAL /HPF
UROBILINOGEN UR STRIP.AUTO-MCNC: NORMAL MG/DL
WBC # BLD AUTO: 9 X10*3/UL (ref 4.4–11.3)
WBC #/AREA URNS AUTO: ABNORMAL /HPF

## 2024-08-24 PROCEDURE — 99285 EMERGENCY DEPT VISIT HI MDM: CPT

## 2024-08-24 PROCEDURE — 2500000004 HC RX 250 GENERAL PHARMACY W/ HCPCS (ALT 636 FOR OP/ED): Performed by: FAMILY MEDICINE

## 2024-08-24 PROCEDURE — 81001 URINALYSIS AUTO W/SCOPE: CPT | Performed by: EMERGENCY MEDICINE

## 2024-08-24 PROCEDURE — 36415 COLL VENOUS BLD VENIPUNCTURE: CPT | Performed by: EMERGENCY MEDICINE

## 2024-08-24 PROCEDURE — 74177 CT ABD & PELVIS W/CONTRAST: CPT | Performed by: RADIOLOGY

## 2024-08-24 PROCEDURE — 2550000001 HC RX 255 CONTRASTS: Performed by: EMERGENCY MEDICINE

## 2024-08-24 PROCEDURE — 96374 THER/PROPH/DIAG INJ IV PUSH: CPT

## 2024-08-24 PROCEDURE — 87086 URINE CULTURE/COLONY COUNT: CPT | Mod: ELYLAB | Performed by: EMERGENCY MEDICINE

## 2024-08-24 PROCEDURE — 2500000004 HC RX 250 GENERAL PHARMACY W/ HCPCS (ALT 636 FOR OP/ED): Performed by: EMERGENCY MEDICINE

## 2024-08-24 PROCEDURE — 74177 CT ABD & PELVIS W/CONTRAST: CPT

## 2024-08-24 PROCEDURE — 83605 ASSAY OF LACTIC ACID: CPT | Performed by: EMERGENCY MEDICINE

## 2024-08-24 PROCEDURE — 80053 COMPREHEN METABOLIC PANEL: CPT | Performed by: EMERGENCY MEDICINE

## 2024-08-24 PROCEDURE — 96375 TX/PRO/DX INJ NEW DRUG ADDON: CPT

## 2024-08-24 PROCEDURE — 85025 COMPLETE CBC W/AUTO DIFF WBC: CPT | Performed by: EMERGENCY MEDICINE

## 2024-08-24 PROCEDURE — 1100000001 HC PRIVATE ROOM DAILY

## 2024-08-24 PROCEDURE — 2500000001 HC RX 250 WO HCPCS SELF ADMINISTERED DRUGS (ALT 637 FOR MEDICARE OP): Performed by: FAMILY MEDICINE

## 2024-08-24 PROCEDURE — 83735 ASSAY OF MAGNESIUM: CPT | Performed by: EMERGENCY MEDICINE

## 2024-08-24 RX ORDER — ATORVASTATIN CALCIUM 20 MG/1
20 TABLET, FILM COATED ORAL NIGHTLY
Status: DISCONTINUED | OUTPATIENT
Start: 2024-08-24 | End: 2024-08-28 | Stop reason: HOSPADM

## 2024-08-24 RX ORDER — TALC
3 POWDER (GRAM) TOPICAL NIGHTLY PRN
Status: DISCONTINUED | OUTPATIENT
Start: 2024-08-24 | End: 2024-08-28 | Stop reason: HOSPADM

## 2024-08-24 RX ORDER — ONDANSETRON HYDROCHLORIDE 2 MG/ML
4 INJECTION, SOLUTION INTRAVENOUS ONCE
Status: COMPLETED | OUTPATIENT
Start: 2024-08-24 | End: 2024-08-24

## 2024-08-24 RX ORDER — KETOROLAC TROMETHAMINE 30 MG/ML
15 INJECTION, SOLUTION INTRAMUSCULAR; INTRAVENOUS ONCE
Status: COMPLETED | OUTPATIENT
Start: 2024-08-24 | End: 2024-08-24

## 2024-08-24 RX ORDER — HYDROCHLOROTHIAZIDE 25 MG/1
12.5 TABLET ORAL 2 TIMES DAILY
Status: DISCONTINUED | OUTPATIENT
Start: 2024-08-24 | End: 2024-08-26

## 2024-08-24 RX ORDER — ENOXAPARIN SODIUM 100 MG/ML
40 INJECTION SUBCUTANEOUS EVERY 24 HOURS
Status: DISCONTINUED | OUTPATIENT
Start: 2024-08-24 | End: 2024-08-28 | Stop reason: HOSPADM

## 2024-08-24 RX ORDER — ACETAMINOPHEN 325 MG/1
650 TABLET ORAL 3 TIMES DAILY PRN
Status: DISCONTINUED | OUTPATIENT
Start: 2024-08-24 | End: 2024-08-27

## 2024-08-24 RX ORDER — ONDANSETRON 4 MG/1
4 TABLET, FILM COATED ORAL EVERY 8 HOURS PRN
Status: DISCONTINUED | OUTPATIENT
Start: 2024-08-24 | End: 2024-08-28 | Stop reason: HOSPADM

## 2024-08-24 RX ORDER — CIPROFLOXACIN 2 MG/ML
400 INJECTION, SOLUTION INTRAVENOUS EVERY 12 HOURS
Status: DISCONTINUED | OUTPATIENT
Start: 2024-08-24 | End: 2024-08-27

## 2024-08-24 RX ORDER — ACETAMINOPHEN 160 MG/5ML
650 SOLUTION ORAL EVERY 4 HOURS PRN
Status: DISCONTINUED | OUTPATIENT
Start: 2024-08-24 | End: 2024-08-27

## 2024-08-24 RX ORDER — CIPROFLOXACIN 2 MG/ML
400 INJECTION, SOLUTION INTRAVENOUS ONCE
Status: COMPLETED | OUTPATIENT
Start: 2024-08-24 | End: 2024-08-24

## 2024-08-24 RX ORDER — ACETAMINOPHEN 325 MG/1
650 TABLET ORAL EVERY 4 HOURS PRN
Status: DISCONTINUED | OUTPATIENT
Start: 2024-08-24 | End: 2024-08-27

## 2024-08-24 RX ORDER — METOPROLOL SUCCINATE 50 MG/1
50 TABLET, EXTENDED RELEASE ORAL DAILY
Status: DISCONTINUED | OUTPATIENT
Start: 2024-08-24 | End: 2024-08-28 | Stop reason: HOSPADM

## 2024-08-24 RX ORDER — DIPHENHYDRAMINE HCL 25 MG
25 TABLET ORAL NIGHTLY PRN
Status: DISCONTINUED | OUTPATIENT
Start: 2024-08-24 | End: 2024-08-28 | Stop reason: HOSPADM

## 2024-08-24 RX ORDER — SODIUM CHLORIDE 9 MG/ML
75 INJECTION, SOLUTION INTRAVENOUS CONTINUOUS
Status: DISCONTINUED | OUTPATIENT
Start: 2024-08-24 | End: 2024-08-28 | Stop reason: HOSPADM

## 2024-08-24 RX ORDER — GABAPENTIN 100 MG/1
100 CAPSULE ORAL NIGHTLY
Status: DISCONTINUED | OUTPATIENT
Start: 2024-08-24 | End: 2024-08-28 | Stop reason: HOSPADM

## 2024-08-24 RX ORDER — MORPHINE SULFATE 4 MG/ML
4 INJECTION, SOLUTION INTRAMUSCULAR; INTRAVENOUS ONCE
Status: COMPLETED | OUTPATIENT
Start: 2024-08-24 | End: 2024-08-24

## 2024-08-24 RX ORDER — FENTANYL CITRATE 50 UG/ML
25 INJECTION, SOLUTION INTRAMUSCULAR; INTRAVENOUS ONCE
Status: DISCONTINUED | OUTPATIENT
Start: 2024-08-24 | End: 2024-08-24

## 2024-08-24 RX ORDER — HYDROCORTISONE 5 MG/1
15 TABLET ORAL EVERY MORNING
Status: DISCONTINUED | OUTPATIENT
Start: 2024-08-25 | End: 2024-08-28 | Stop reason: HOSPADM

## 2024-08-24 RX ORDER — ACETAMINOPHEN 325 MG/1
500 TABLET ORAL NIGHTLY PRN
Status: DISCONTINUED | OUTPATIENT
Start: 2024-08-24 | End: 2024-08-28 | Stop reason: HOSPADM

## 2024-08-24 RX ORDER — ACETAMINOPHEN 650 MG/1
650 SUPPOSITORY RECTAL EVERY 4 HOURS PRN
Status: DISCONTINUED | OUTPATIENT
Start: 2024-08-24 | End: 2024-08-27

## 2024-08-24 RX ORDER — FENTANYL CITRATE 50 UG/ML
50 INJECTION, SOLUTION INTRAMUSCULAR; INTRAVENOUS ONCE
Status: COMPLETED | OUTPATIENT
Start: 2024-08-24 | End: 2024-08-24

## 2024-08-24 RX ORDER — CLOPIDOGREL BISULFATE 75 MG/1
75 TABLET ORAL DAILY
Status: DISCONTINUED | OUTPATIENT
Start: 2024-08-24 | End: 2024-08-28 | Stop reason: HOSPADM

## 2024-08-24 RX ORDER — FAMOTIDINE 20 MG/1
20 TABLET, FILM COATED ORAL 2 TIMES DAILY PRN
Status: DISCONTINUED | OUTPATIENT
Start: 2024-08-24 | End: 2024-08-28 | Stop reason: HOSPADM

## 2024-08-24 RX ORDER — ONDANSETRON HYDROCHLORIDE 2 MG/ML
4 INJECTION, SOLUTION INTRAVENOUS EVERY 8 HOURS PRN
Status: DISCONTINUED | OUTPATIENT
Start: 2024-08-24 | End: 2024-08-28 | Stop reason: HOSPADM

## 2024-08-24 RX ORDER — FAMOTIDINE 10 MG/ML
20 INJECTION INTRAVENOUS 2 TIMES DAILY PRN
Status: DISCONTINUED | OUTPATIENT
Start: 2024-08-24 | End: 2024-08-28 | Stop reason: HOSPADM

## 2024-08-24 RX ORDER — POLYETHYLENE GLYCOL 3350 17 G/17G
17 POWDER, FOR SOLUTION ORAL DAILY
Status: DISCONTINUED | OUTPATIENT
Start: 2024-08-24 | End: 2024-08-28 | Stop reason: HOSPADM

## 2024-08-24 RX ADMIN — CIPROFLOXACIN 400 MG: 400 INJECTION, SOLUTION INTRAVENOUS at 08:07

## 2024-08-24 RX ADMIN — ENOXAPARIN SODIUM 40 MG: 40 INJECTION SUBCUTANEOUS at 15:43

## 2024-08-24 RX ADMIN — SODIUM CHLORIDE 75 ML/HR: 9 INJECTION, SOLUTION INTRAVENOUS at 16:12

## 2024-08-24 RX ADMIN — CIPROFLOXACIN 400 MG: 400 INJECTION, SOLUTION INTRAVENOUS at 20:26

## 2024-08-24 RX ADMIN — DIPHENHYDRAMINE HYDROCHLORIDE 25 MG: 25 TABLET ORAL at 20:36

## 2024-08-24 RX ADMIN — ATORVASTATIN CALCIUM 20 MG: 20 TABLET, FILM COATED ORAL at 20:27

## 2024-08-24 RX ADMIN — HYDROCHLOROTHIAZIDE 12.5 MG: 25 TABLET ORAL at 20:27

## 2024-08-24 RX ADMIN — HYDROCORTISONE SODIUM SUCCINATE 100 MG: 100 INJECTION, POWDER, FOR SOLUTION INTRAMUSCULAR; INTRAVENOUS at 08:49

## 2024-08-24 RX ADMIN — ACETAMINOPHEN 487.5 MG: 325 TABLET ORAL at 20:26

## 2024-08-24 RX ADMIN — IOHEXOL 75 ML: 350 INJECTION, SOLUTION INTRAVENOUS at 06:52

## 2024-08-24 RX ADMIN — CLOPIDOGREL BISULFATE 75 MG: 75 TABLET, FILM COATED ORAL at 15:43

## 2024-08-24 RX ADMIN — FENTANYL CITRATE 50 MCG: 50 INJECTION, SOLUTION INTRAMUSCULAR; INTRAVENOUS at 05:53

## 2024-08-24 RX ADMIN — KETOROLAC TROMETHAMINE 15 MG: 30 INJECTION, SOLUTION INTRAMUSCULAR at 08:49

## 2024-08-24 RX ADMIN — ONDANSETRON 4 MG: 2 INJECTION INTRAMUSCULAR; INTRAVENOUS at 07:37

## 2024-08-24 RX ADMIN — SODIUM CHLORIDE 1000 ML: 9 INJECTION, SOLUTION INTRAVENOUS at 05:53

## 2024-08-24 RX ADMIN — MORPHINE SULFATE 4 MG: 4 INJECTION, SOLUTION INTRAMUSCULAR; INTRAVENOUS at 07:37

## 2024-08-24 RX ADMIN — METOPROLOL SUCCINATE 50 MG: 50 TABLET, EXTENDED RELEASE ORAL at 15:43

## 2024-08-24 RX ADMIN — GABAPENTIN 100 MG: 100 CAPSULE ORAL at 20:27

## 2024-08-24 SDOH — SOCIAL STABILITY: SOCIAL INSECURITY: DO YOU FEEL ANYONE HAS EXPLOITED OR TAKEN ADVANTAGE OF YOU FINANCIALLY OR OF YOUR PERSONAL PROPERTY?: NO

## 2024-08-24 SDOH — SOCIAL STABILITY: SOCIAL INSECURITY: ARE YOU OR HAVE YOU BEEN THREATENED OR ABUSED PHYSICALLY, EMOTIONALLY, OR SEXUALLY BY ANYONE?: NO

## 2024-08-24 SDOH — SOCIAL STABILITY: SOCIAL INSECURITY: WERE YOU ABLE TO COMPLETE ALL THE BEHAVIORAL HEALTH SCREENINGS?: YES

## 2024-08-24 SDOH — SOCIAL STABILITY: SOCIAL INSECURITY: ABUSE: ADULT

## 2024-08-24 SDOH — SOCIAL STABILITY: SOCIAL INSECURITY: DOES ANYONE TRY TO KEEP YOU FROM HAVING/CONTACTING OTHER FRIENDS OR DOING THINGS OUTSIDE YOUR HOME?: NO

## 2024-08-24 SDOH — SOCIAL STABILITY: SOCIAL INSECURITY: HAVE YOU HAD THOUGHTS OF HARMING ANYONE ELSE?: NO

## 2024-08-24 SDOH — SOCIAL STABILITY: SOCIAL INSECURITY: HAVE YOU HAD ANY THOUGHTS OF HARMING ANYONE ELSE?: NO

## 2024-08-24 SDOH — SOCIAL STABILITY: SOCIAL INSECURITY: HAS ANYONE EVER THREATENED TO HURT YOUR FAMILY OR YOUR PETS?: NO

## 2024-08-24 SDOH — SOCIAL STABILITY: SOCIAL INSECURITY: DO YOU FEEL UNSAFE GOING BACK TO THE PLACE WHERE YOU ARE LIVING?: NO

## 2024-08-24 ASSESSMENT — COGNITIVE AND FUNCTIONAL STATUS - GENERAL
MOBILITY SCORE: 24
DAILY ACTIVITIY SCORE: 24
DAILY ACTIVITIY SCORE: 24
MOBILITY SCORE: 24
PATIENT BASELINE BEDBOUND: NO

## 2024-08-24 ASSESSMENT — COLUMBIA-SUICIDE SEVERITY RATING SCALE - C-SSRS
1. IN THE PAST MONTH, HAVE YOU WISHED YOU WERE DEAD OR WISHED YOU COULD GO TO SLEEP AND NOT WAKE UP?: NO
6. HAVE YOU EVER DONE ANYTHING, STARTED TO DO ANYTHING, OR PREPARED TO DO ANYTHING TO END YOUR LIFE?: NO
2. HAVE YOU ACTUALLY HAD ANY THOUGHTS OF KILLING YOURSELF?: NO

## 2024-08-24 ASSESSMENT — ACTIVITIES OF DAILY LIVING (ADL)
TOILETING: INDEPENDENT
HEARING - LEFT EAR: FUNCTIONAL
BATHING: INDEPENDENT
DRESSING YOURSELF: INDEPENDENT
WALKS IN HOME: INDEPENDENT
LACK_OF_TRANSPORTATION: NO
ADEQUATE_TO_COMPLETE_ADL: YES
GROOMING: INDEPENDENT
HEARING - RIGHT EAR: FUNCTIONAL
FEEDING YOURSELF: INDEPENDENT
JUDGMENT_ADEQUATE_SAFELY_COMPLETE_DAILY_ACTIVITIES: YES
PATIENT'S MEMORY ADEQUATE TO SAFELY COMPLETE DAILY ACTIVITIES?: YES

## 2024-08-24 ASSESSMENT — LIFESTYLE VARIABLES
EVER FELT BAD OR GUILTY ABOUT YOUR DRINKING: NO
HAVE PEOPLE ANNOYED YOU BY CRITICIZING YOUR DRINKING: NO
SKIP TO QUESTIONS 9-10: 1
TOTAL SCORE: 0
AUDIT-C TOTAL SCORE: 0
HOW MANY STANDARD DRINKS CONTAINING ALCOHOL DO YOU HAVE ON A TYPICAL DAY: PATIENT DOES NOT DRINK
HOW OFTEN DO YOU HAVE 6 OR MORE DRINKS ON ONE OCCASION: NEVER
AUDIT-C TOTAL SCORE: 0
EVER HAD A DRINK FIRST THING IN THE MORNING TO STEADY YOUR NERVES TO GET RID OF A HANGOVER: NO
HAVE YOU EVER FELT YOU SHOULD CUT DOWN ON YOUR DRINKING: NO
HOW OFTEN DO YOU HAVE A DRINK CONTAINING ALCOHOL: NEVER

## 2024-08-24 ASSESSMENT — PAIN DESCRIPTION - DESCRIPTORS: DESCRIPTORS: STABBING

## 2024-08-24 ASSESSMENT — PAIN DESCRIPTION - PROGRESSION
CLINICAL_PROGRESSION: NOT CHANGED
CLINICAL_PROGRESSION: GRADUALLY IMPROVING

## 2024-08-24 ASSESSMENT — PAIN SCALES - GENERAL
PAINLEVEL_OUTOF10: 10 - WORST POSSIBLE PAIN
PAINLEVEL_OUTOF10: 3
PAINLEVEL_OUTOF10: 10 - WORST POSSIBLE PAIN
PAINLEVEL_OUTOF10: 9

## 2024-08-24 ASSESSMENT — PAIN DESCRIPTION - ONSET: ONSET: GRADUAL

## 2024-08-24 ASSESSMENT — PAIN - FUNCTIONAL ASSESSMENT
PAIN_FUNCTIONAL_ASSESSMENT: 0-10

## 2024-08-24 ASSESSMENT — PATIENT HEALTH QUESTIONNAIRE - PHQ9
1. LITTLE INTEREST OR PLEASURE IN DOING THINGS: NOT AT ALL
2. FEELING DOWN, DEPRESSED OR HOPELESS: NOT AT ALL
SUM OF ALL RESPONSES TO PHQ9 QUESTIONS 1 & 2: 0

## 2024-08-24 ASSESSMENT — PAIN DESCRIPTION - FREQUENCY: FREQUENCY: CONSTANT/CONTINUOUS

## 2024-08-24 ASSESSMENT — PAIN DESCRIPTION - PAIN TYPE: TYPE: ACUTE PAIN

## 2024-08-24 ASSESSMENT — PAIN DESCRIPTION - ORIENTATION: ORIENTATION: RIGHT

## 2024-08-24 ASSESSMENT — PAIN DESCRIPTION - LOCATION: LOCATION: ABDOMEN

## 2024-08-24 NOTE — CARE PLAN
The patient's goals for the shift include  pain control  Problem: Pain - Adult  Goal: Verbalizes/displays adequate comfort level or baseline comfort level  Outcome: Progressing     Problem: Pain  Goal: Takes deep breaths with improved pain control throughout the shift  Outcome: Progressing  Goal: Turns in bed with improved pain control throughout the shift  Outcome: Progressing  Goal: Walks with improved pain control throughout the shift  Outcome: Progressing  Goal: Performs ADL's with improved pain control throughout shift  Outcome: Progressing  Goal: Participates in PT with improved pain control throughout the shift  Outcome: Progressing  Goal: Free from opioid side effects throughout the shift  Outcome: Progressing  Goal: Free from acute confusion related to pain meds throughout the shift  Outcome: Progressing       The clinical goals for the shift include pain control

## 2024-08-24 NOTE — ED PROVIDER NOTES
74-year-old female presents emergency department with chief complaint of abdominal pain.  Patient states she has been having these symptoms since Monday.  She reports she did follow with a primary care doctor who found that she had a urinary tract infection.  Patient states she has been on Cipro and has taken 2 doses, but is not having relief.  She describes her pain as aching to sharp stabbing pain in her right lower quadrant and low back area.  Denies any fevers, but reports chills.  No significant coughing or congestion.  Patient denies chest pain or difficulty breathing.  She admits to nausea but no vomiting.  Denies dysuria, hematuria, or constipation.  Patient does report diarrhea.  Patient is on Plavix.  Chart review shows significant past medical history of Raghu's disease, hypertension, adrenal gland neoplasm, vertigo, cognitive impairment, gait disturbance, hyperlipidemia, chronic kidney disease, who presented.  Overall her echo frequent urinary tract infections, renal cyst, and previous tobacco use.  No reported illicit drug use or regular alcohol use.      History provided by:  Patient   used: No           ------------------------------------------------------------------------------------------------------------------------------------------    VS: As documented in the triage note and EMR flowsheet from this visit were reviewed.    Review of Systems  Constitutional: no fever reports chills and malaise  Eyes: no redness, discharge, pain  HENT: no sore throat, nose bleeds, congestion, rhinorrhea   Cardiovascular: no chest pain, leg edema, palpitations  Respiratory: no shortness of breath, cough, wheezing  GI: Admits to nausea nausea and right-sided abdominal pain reports diarrhea no vomiting, constipation, BRBPR, melena  : no dysuria, frequency, hematuria, vaginal symptoms  Musculoskeletal: no neck pain, stiffness,  no joint deformity, swelling reports right low back pain/flank  pain  Skin: no rash, erythema, wounds  Neurological: no headache, dizziness, lightheadedness, weakness, numbness, or tingling  Psychiatric: no suicidal thoughts, confusion, agitation  Metabolic: no polyuria or polydipsia  Hematologic: Patient is on Plavix  Immunology: No immunocompromise state    [facility]  Nursing notes reviewed and confirmed by me.  Chart review performed including medications, allergies, and medical, surgical, and family history  Visit Vitals  /51 (BP Location: Right arm)   Pulse 64   Temp 36 °C (96.8 °F) (Tympanic)   Resp 18     Physical Exam  Vitals and nursing note reviewed.   Constitutional:       General: She is not in acute distress.     Appearance: Normal appearance. She is not ill-appearing.   HENT:      Head: Normocephalic and atraumatic.      Right Ear: External ear normal.      Left Ear: External ear normal.      Nose: Nose normal. No congestion or rhinorrhea.      Mouth/Throat:      Mouth: Mucous membranes are moist.      Pharynx: No oropharyngeal exudate or posterior oropharyngeal erythema.   Eyes:      Extraocular Movements: Extraocular movements intact.      Conjunctiva/sclera: Conjunctivae normal.      Pupils: Pupils are equal, round, and reactive to light.   Cardiovascular:      Rate and Rhythm: Normal rate and regular rhythm.      Pulses: Normal pulses.      Heart sounds: Normal heart sounds.   Pulmonary:      Effort: Pulmonary effort is normal. No respiratory distress.      Breath sounds: Normal breath sounds. No stridor. No wheezing, rhonchi or rales.   Abdominal:      General: There is no distension.      Palpations: Abdomen is soft.      Tenderness: There is abdominal tenderness (Diffuse right-sided abdominal tenderness localizes to the lower quadrant.  Positive McBurney's point tenderness.  Positive Rovsing sign.). There is no guarding or rebound.   Musculoskeletal:         General: No swelling or deformity. Normal range of motion.      Cervical back: Normal range of  motion and neck supple. No rigidity.      Right lower leg: No edema.      Left lower leg: No edema.      Comments: No calf tenderness   No point midline back tenderness, step-offs, or deformities.   Skin:     General: Skin is warm and dry.      Capillary Refill: Capillary refill takes less than 2 seconds.      Coloration: Skin is not jaundiced.      Findings: No rash.   Neurological:      General: No focal deficit present.      Mental Status: She is alert and oriented to person, place, and time.      Sensory: No sensory deficit.      Motor: No weakness.   Psychiatric:         Mood and Affect: Mood normal.         Behavior: Behavior normal.        Past Medical History:   Diagnosis Date    Raghu's disease (Multi)     Anemia     Arthritis     Asymptomatic menopausal state     Post-menopausal    Carotid artery stenosis     Cerebral infarction due to unspecified occlusion or stenosis of unspecified cerebral artery (Multi) 12/20/2021    Right pontine stroke    Cerebral vascular accident (Multi)     MILD LEFT SIDE WEAKNESS    CKD (chronic kidney disease)     STAGE 3A    Claustrophobia     COVID-19     VACCINATED    Decreased coordination     Frequency-urgency syndrome     Gait disturbance     Gout     H/O adrenal disorder     Hyperlipidemia     Hypertension     Other conditions influencing health status     Stroke syndrome    Other tear of lateral meniscus, current injury, unspecified knee, initial encounter 02/21/2020    Lateral meniscal tear    Personal history of other specified conditions 12/29/2021    History of headache    Personal history of other specified conditions 01/10/2022    History of fever    Personal history of other specified conditions 12/29/2021    History of fatigue    Personal history of transient ischemic attack (TIA), and cerebral infarction without residual deficits 12/20/2021    History of transient ischemic attack    Shortness of breath     Unilateral primary osteoarthritis, left knee  03/25/2020    Left knee DJD    Unspecified kidney failure     Renal failure    Urinary tract infection     Urine retention     Venous stasis     BILATERAL    Vertigo     Wears dentures       Past Surgical History:   Procedure Laterality Date    ADRENAL GLAND SURGERY      LEFT ADRENAL GLAND REMOVED    BLADDER SURGERY  10/29/2013    Bladder Surgery    COLONOSCOPY      COLONOSCOPY      CT ANGIO NECK  06/18/2019    CT NECK ANGIO W AND WO IV CONTRAST 6/18/2019 [facility]    HYSTERECTOMY  01/28/2020    Hysterectomy    MR HEAD ANGIO WO IV CONTRAST  06/13/2019    MR HEAD ANGIO WO IV CONTRAST 6/13/2019 [facility]    MR NECK ANGIO WO IV CONTRAST  06/13/2019    MR NECK ANGIO WO IV CONTRAST 6/13/2019 [facility]    OTHER SURGICAL HISTORY  11/24/2021    Varicose vein ligation    OTHER SURGICAL HISTORY Left 11/24/2021    Knee replacement    OTHER SURGICAL HISTORY  11/24/2021    Colonoscopy    OTHER SURGICAL HISTORY Left 03/02/2020    Knee arthroscopy      Social History     Socioeconomic History    Marital status:    Tobacco Use    Smoking status: Former     Types: Cigarettes    Smokeless tobacco: Never   Vaping Use    Vaping status: Never Used   Substance and Sexual Activity    Alcohol use: Not Currently    Drug use: Never    Sexual activity: Yes     Partners: Male      ------------------------------------------------------------------------------------------------------------------------------------------  CT abdomen pelvis w IV contrast    (Results Pending)      Labs Reviewed   COMPREHENSIVE METABOLIC PANEL - Abnormal       Result Value    Glucose 102 (*)     Sodium 136      Potassium 4.4      Chloride 102      Bicarbonate 25      Anion Gap 13      Urea Nitrogen 23      Creatinine 1.08 (*)     eGFR 53 (*)     Calcium 9.9      Albumin 4.2      Alkaline Phosphatase 60      Total Protein 6.9      AST 13      Bilirubin, Total 0.6      ALT 10     MAGNESIUM - Abnormal    Magnesium 1.52 (*)     LACTATE - Normal    Lactate 1.6      Narrative:     Venipuncture immediately after or during the administration of Metamizole may lead to falsely low results. Testing should be performed immediately  prior to Metamizole dosing.   CBC WITH AUTO DIFFERENTIAL    WBC 9.0      nRBC 0.0      RBC 4.37      Hemoglobin 13.4      Hematocrit 39.8      MCV 91      MCH 30.7      MCHC 33.7      RDW 12.9      Platelets 275      Neutrophils % 60.4      Immature Granulocytes %, Automated 0.4      Lymphocytes % 29.3      Monocytes % 7.2      Eosinophils % 1.9      Basophils % 0.8      Neutrophils Absolute 5.41      Immature Granulocytes Absolute, Automated 0.04      Lymphocytes Absolute 2.63      Monocytes Absolute 0.65      Eosinophils Absolute 0.17      Basophils Absolute 0.07     URINALYSIS WITH REFLEX CULTURE AND MICROSCOPIC    Narrative:     The following orders were created for panel order Urinalysis with Reflex Culture and Microscopic.  Procedure                               Abnormality         Status                     ---------                               -----------         ------                     Urinalysis with Reflex C...[[id number]]                                                 Extra Urine Gray Tube[[id number]]                                                         Please view results for these tests on the individual orders.   URINALYSIS WITH REFLEX CULTURE AND MICROSCOPIC   EXTRA URINE GRAY TUBE        Medical Decision Making  74-year-old female presents emergency department with chief complaint of right-sided abdominal pain and recent findings of urinary tract infection.  On my exam patient is appreciated diffuse right-sided tenderness that localizes to the lower quadrant.  She is afebrile nontoxic.  A thorough workup was obtained given her presenting symptoms.  Patient treated symptomatically with IV fluids and fentanyl.  CBC does not show significant leukocytosis or anemia.  Patient does not have findings  of sepsis.  CMP is consistent with patient's chronic kidney disease.  Magnesium is slightly low though nonspecific.  Lactate within normal range I do not suspect ischemic cause of her symptoms.  At the end of my shift urine studies and CT abdomen pelvis are pending.  Patient signed out to Dr. Powers pending UA, CT, reevaluation, disposition.        Diagnoses as of 08/24/24 0653   Right lower quadrant abdominal pain      1. Right lower quadrant abdominal pain           Procedures     This note was dictated using dragon software and may contain errors related to dictation interpretation errors.      Per Tapia, DO  08/24/24 0653

## 2024-08-24 NOTE — PROGRESS NOTES
Emergency Medicine Transition of Care Note    I received Daysi Resendiz in signout from Dr. Tapia.  Please see the previous ED provider note for all HPI, PE and MDM up to the time of signout at 0700. This is in addition to the primary record.    In brief Daysi Resendiz is an 74 y.o. female presenting for   Chief Complaint   Patient presents with    Abdominal Pain     UTI symptoms, put on antibiotics yesterday by PCP, pain as gotten worse.     At the time of signout we were awaiting: CT, labs    Diagnoses as of 08/24/24 0826   Right lower quadrant abdominal pain   Cystitis       Medical Decision Making    Labs Reviewed   COMPREHENSIVE METABOLIC PANEL - Abnormal       Result Value    Glucose 102 (*)     Sodium 136      Potassium 4.4      Chloride 102      Bicarbonate 25      Anion Gap 13      Urea Nitrogen 23      Creatinine 1.08 (*)     eGFR 53 (*)     Calcium 9.9      Albumin 4.2      Alkaline Phosphatase 60      Total Protein 6.9      AST 13      Bilirubin, Total 0.6      ALT 10     MAGNESIUM - Abnormal    Magnesium 1.52 (*)    URINALYSIS WITH REFLEX CULTURE AND MICROSCOPIC - Abnormal    Color, Urine Light-Yellow      Appearance, Urine Clear      Specific Gravity, Urine 1.022      pH, Urine 5.5      Protein, Urine NEGATIVE      Glucose, Urine Normal      Blood, Urine NEGATIVE      Ketones, Urine NEGATIVE      Bilirubin, Urine NEGATIVE      Urobilinogen, Urine Normal      Nitrite, Urine 2+ (*)     Leukocyte Esterase, Urine 75 Noe/µL (*)    MICROSCOPIC ONLY, URINE - Abnormal    WBC, Urine 11-20 (*)     RBC, Urine NONE      Squamous Epithelial Cells, Urine 1-9 (SPARSE)     LACTATE - Normal    Lactate 1.6      Narrative:     Venipuncture immediately after or during the administration of Metamizole may lead to falsely low results. Testing should be performed immediately  prior to Metamizole dosing.   URINE CULTURE   CBC WITH AUTO DIFFERENTIAL    WBC 9.0      nRBC 0.0      RBC 4.37      Hemoglobin 13.4      Hematocrit  39.8      MCV 91      MCH 30.7      MCHC 33.7      RDW 12.9      Platelets 275      Neutrophils % 60.4      Immature Granulocytes %, Automated 0.4      Lymphocytes % 29.3      Monocytes % 7.2      Eosinophils % 1.9      Basophils % 0.8      Neutrophils Absolute 5.41      Immature Granulocytes Absolute, Automated 0.04      Lymphocytes Absolute 2.63      Monocytes Absolute 0.65      Eosinophils Absolute 0.17      Basophils Absolute 0.07     URINALYSIS WITH REFLEX CULTURE AND MICROSCOPIC    Narrative:     The following orders were created for panel order Urinalysis with Reflex Culture and Microscopic.  Procedure                               Abnormality         Status                     ---------                               -----------         ------                     Urinalysis with Reflex C...[[id number]]  Abnormal            Final result               Extra Urine Gray Tube[[id number]]                            In process                   Please view results for these tests on the individual orders.   EXTRA URINE GRAY TUBE       CT abdomen pelvis w IV contrast   Final Result   1. No acute abdominal or pelvic abnormalities   2. Prior hysterectomy, bladder suspension and removal of the left   adrenal gland   3. Degenerative changes of the lumbar spine with lumbar canal   stenosis at L3-4   4. 5 mm left lower lobe lung nodule does not require further   follow-up due to its small size per Fleischner criteria and long-term   stability.   5. The lung nodule does not require follow-up due to its small size.   Nodules less than 6 mm do not require routine follow-up in a low risk   individual according to the Fleischner Society 2017 guidelines.   6. There are bilateral renal cortical hypodensities that are   statistically most likely cysts and are similar to the previous exam             Signed by: Kellie Watkins 8/24/2024 7:32 AM   Dictation workstation:   [id number]        Diagnostic evaluation was completed.   Urinalysis shows a mild UTI with 11-20 white blood cells.  There are nitrites and leukocyte esterase.  Metabolic panel shows a normal sodium potassium level.  BUN is in the normal range creatinine is slightly elevated at 1.08.  Liver function is in the normal range.  Lactate is in the normal range so do not suspect sepsis.  CBC shows a normal white blood cell count and no evidence of anemia.  CT of the abdomen pelvis shows no acute abdominal or pelvic abnormalities.    Since the patient had already been on Cipro and continues to have increased symptoms, it is suspected the patient is failing outpatient treatment.  Her primary care doctor sent her to the hospital for hospitalization for IV antibiotics.    I discussed the results and plan for hospitalization with the patient and/or family/friend if present.  Questions were addressed.  Patient and/or family/friend expressed understanding.    The patient's condition requires ongoing treatment and evaluation necessitating hospital admission.  I have reviewed the patient's history, physical exam, and test information with the admitting physician,     Dr. Felix              , who agrees to hospitalize the patient.       Final diagnoses:   [R10.31] Right lower quadrant abdominal pain   [N30.90] Cystitis           Procedure  Procedures    Brooks Powers MD

## 2024-08-25 VITALS
HEART RATE: 67 BPM | RESPIRATION RATE: 18 BRPM | BODY MASS INDEX: 29.86 KG/M2 | HEIGHT: 68 IN | TEMPERATURE: 97.7 F | SYSTOLIC BLOOD PRESSURE: 135 MMHG | WEIGHT: 197 LBS | OXYGEN SATURATION: 95 % | DIASTOLIC BLOOD PRESSURE: 57 MMHG

## 2024-08-25 LAB
ALBUMIN SERPL BCP-MCNC: 3.5 G/DL (ref 3.4–5)
ALP SERPL-CCNC: 45 U/L (ref 33–136)
ALT SERPL W P-5'-P-CCNC: 7 U/L (ref 7–45)
ANION GAP SERPL CALC-SCNC: 11 MMOL/L (ref 10–20)
AST SERPL W P-5'-P-CCNC: 14 U/L (ref 9–39)
BACTERIA UR CULT: ABNORMAL
BACTERIA UR CULT: NORMAL
BILIRUB SERPL-MCNC: 0.7 MG/DL (ref 0–1.2)
BUN SERPL-MCNC: 23 MG/DL (ref 6–23)
CALCIUM SERPL-MCNC: 9 MG/DL (ref 8.6–10.3)
CHLORIDE SERPL-SCNC: 104 MMOL/L (ref 98–107)
CO2 SERPL-SCNC: 24 MMOL/L (ref 21–32)
CREAT SERPL-MCNC: 1.1 MG/DL (ref 0.5–1.05)
EGFRCR SERPLBLD CKD-EPI 2021: 52 ML/MIN/1.73M*2
ERYTHROCYTE [DISTWIDTH] IN BLOOD BY AUTOMATED COUNT: 12.6 % (ref 11.5–14.5)
GLUCOSE SERPL-MCNC: 94 MG/DL (ref 74–99)
HCT VFR BLD AUTO: 33.3 % (ref 36–46)
HGB BLD-MCNC: 11.3 G/DL (ref 12–16)
HOLD SPECIMEN: NORMAL
MCH RBC QN AUTO: 30.9 PG (ref 26–34)
MCHC RBC AUTO-ENTMCNC: 33.9 G/DL (ref 32–36)
MCV RBC AUTO: 91 FL (ref 80–100)
NRBC BLD-RTO: 0 /100 WBCS (ref 0–0)
PLATELET # BLD AUTO: 214 X10*3/UL (ref 150–450)
POTASSIUM SERPL-SCNC: 4 MMOL/L (ref 3.5–5.3)
PROT SERPL-MCNC: 5.7 G/DL (ref 6.4–8.2)
RBC # BLD AUTO: 3.66 X10*6/UL (ref 4–5.2)
SODIUM SERPL-SCNC: 135 MMOL/L (ref 136–145)
WBC # BLD AUTO: 7.9 X10*3/UL (ref 4.4–11.3)

## 2024-08-25 PROCEDURE — 36415 COLL VENOUS BLD VENIPUNCTURE: CPT | Performed by: FAMILY MEDICINE

## 2024-08-25 PROCEDURE — 99223 1ST HOSP IP/OBS HIGH 75: CPT | Performed by: FAMILY MEDICINE

## 2024-08-25 PROCEDURE — 2500000004 HC RX 250 GENERAL PHARMACY W/ HCPCS (ALT 636 FOR OP/ED): Performed by: FAMILY MEDICINE

## 2024-08-25 PROCEDURE — 2500000001 HC RX 250 WO HCPCS SELF ADMINISTERED DRUGS (ALT 637 FOR MEDICARE OP): Performed by: FAMILY MEDICINE

## 2024-08-25 PROCEDURE — 1100000001 HC PRIVATE ROOM DAILY

## 2024-08-25 PROCEDURE — 84075 ASSAY ALKALINE PHOSPHATASE: CPT | Performed by: FAMILY MEDICINE

## 2024-08-25 PROCEDURE — 85027 COMPLETE CBC AUTOMATED: CPT | Performed by: FAMILY MEDICINE

## 2024-08-25 PROCEDURE — 2500000002 HC RX 250 W HCPCS SELF ADMINISTERED DRUGS (ALT 637 FOR MEDICARE OP, ALT 636 FOR OP/ED): Performed by: FAMILY MEDICINE

## 2024-08-25 RX ORDER — TRAMADOL HYDROCHLORIDE 50 MG/1
50 TABLET ORAL EVERY 8 HOURS PRN
Status: DISCONTINUED | OUTPATIENT
Start: 2024-08-25 | End: 2024-08-28 | Stop reason: HOSPADM

## 2024-08-25 RX ADMIN — DIPHENHYDRAMINE HYDROCHLORIDE 25 MG: 25 TABLET ORAL at 20:11

## 2024-08-25 RX ADMIN — HYDROCORTISONE 15 MG: 5 TABLET ORAL at 08:11

## 2024-08-25 RX ADMIN — CIPROFLOXACIN 400 MG: 400 INJECTION, SOLUTION INTRAVENOUS at 19:59

## 2024-08-25 RX ADMIN — HYDROCHLOROTHIAZIDE 12.5 MG: 25 TABLET ORAL at 08:11

## 2024-08-25 RX ADMIN — TRAMADOL HYDROCHLORIDE 50 MG: 50 TABLET ORAL at 15:36

## 2024-08-25 RX ADMIN — ENOXAPARIN SODIUM 40 MG: 40 INJECTION SUBCUTANEOUS at 15:36

## 2024-08-25 RX ADMIN — ATORVASTATIN CALCIUM 20 MG: 20 TABLET, FILM COATED ORAL at 20:00

## 2024-08-25 RX ADMIN — TRAMADOL HYDROCHLORIDE 50 MG: 50 TABLET ORAL at 23:14

## 2024-08-25 RX ADMIN — Medication 3 MG: at 20:00

## 2024-08-25 RX ADMIN — HYDROCHLOROTHIAZIDE 12.5 MG: 25 TABLET ORAL at 20:00

## 2024-08-25 RX ADMIN — METOPROLOL SUCCINATE 50 MG: 50 TABLET, EXTENDED RELEASE ORAL at 08:11

## 2024-08-25 RX ADMIN — ACETAMINOPHEN 650 MG: 325 TABLET ORAL at 20:07

## 2024-08-25 RX ADMIN — CLOPIDOGREL BISULFATE 75 MG: 75 TABLET, FILM COATED ORAL at 08:11

## 2024-08-25 RX ADMIN — CIPROFLOXACIN 400 MG: 400 INJECTION, SOLUTION INTRAVENOUS at 08:12

## 2024-08-25 RX ADMIN — GABAPENTIN 100 MG: 100 CAPSULE ORAL at 19:59

## 2024-08-25 ASSESSMENT — COGNITIVE AND FUNCTIONAL STATUS - GENERAL
MOBILITY SCORE: 24
DAILY ACTIVITIY SCORE: 24

## 2024-08-25 ASSESSMENT — ENCOUNTER SYMPTOMS
JOINT SWELLING: 0
FATIGUE: 0
WHEEZING: 0
COUGH: 0
ABDOMINAL PAIN: 0
SORE THROAT: 0
HEMATURIA: 0
AGITATION: 0
ARTHRALGIAS: 0
LIGHT-HEADEDNESS: 0
CONSTIPATION: 0
FLANK PAIN: 0
NERVOUS/ANXIOUS: 0
MYALGIAS: 0
FEVER: 0
HEADACHES: 0
UNEXPECTED WEIGHT CHANGE: 0
PALPITATIONS: 0
SHORTNESS OF BREATH: 0
DIARRHEA: 0
NAUSEA: 0
DYSURIA: 0

## 2024-08-25 ASSESSMENT — PAIN SCALES - GENERAL
PAINLEVEL_OUTOF10: 5 - MODERATE PAIN
PAINLEVEL_OUTOF10: 8
PAINLEVEL_OUTOF10: 10 - WORST POSSIBLE PAIN
PAINLEVEL_OUTOF10: 3
PAINLEVEL_OUTOF10: 0 - NO PAIN

## 2024-08-25 ASSESSMENT — PAIN - FUNCTIONAL ASSESSMENT
PAIN_FUNCTIONAL_ASSESSMENT: 0-10

## 2024-08-25 ASSESSMENT — PAIN DESCRIPTION - LOCATION: LOCATION: ABDOMEN

## 2024-08-25 ASSESSMENT — PAIN DESCRIPTION - ORIENTATION: ORIENTATION: RIGHT

## 2024-08-25 NOTE — CARE PLAN
The patient's goals for the shift include  no pain    The clinical goals for the shift include Patient will verbalize pain at tolerable level through out shift      Problem: Pain - Adult  Goal: Verbalizes/displays adequate comfort level or baseline comfort level  Outcome: Progressing     Problem: Pain  Goal: Free from acute confusion related to pain meds throughout the shift  Outcome: Progressing     Problem: Pain  Goal: Performs ADL's with improved pain control throughout shift  Outcome: Progressing

## 2024-08-25 NOTE — H&P
History Of Present Illness  Daysi Resendiz is a 74 y.o. female presenting with uti    .74-year-old female presents emergency department with chief complaint of abdominal pain. Patient states she has been having these symptoms since Monday. She reports she did follow with a primary care doctor who found that she had a urinary tract infection. Patient states she has been on Cipro and has taken 2 doses, but is not having relief. She describes her pain as aching to sharp stabbing pain in her right lower quadrant and low back area. Denies any fevers, but reports chills. No significant coughing or congestion. Patient denies chest pain or difficulty breathing. She admits to nausea but no vomiting. Denies dysuria, hematuria, or constipation. Patient does report diarrhea. Patient is on Plavix. Chart review shows significant past medical history of Raghu's disease, hypertension, adrenal gland neoplasm, vertigo, cognitive impairment, gait disturbance, hyperlipidemia, chronic kidney disease, who presented. Overall her echo frequent urinary tract infections, renal cyst, and previous tobacco use. No reported illicit drug use or regular alcohol use.     Patient has history of [place]'s.  Thus has difficulty with infections.  Was being treated for urinary tract infection as an outpatient.  Was not getting better.  Was suggested by primary care physician to come to hospital for admission.  Patient had declined initially.  Then she got worse.  Came to the emergency department.  Was seen at the bedside.  Still complains of feeling badly.  Right flank pain.  Right CVA tenderness.  She follows with urology.  Outpatient [facility].  CT scan revealed a right renal cyst.  Stable.  In the emergency department patient received IV fluids and fentanyl.  No findings of sepsis.  Chronic kidney disease.  Patient was admitted.  Will be placed on IV antibiotics.  IV analgesics.  Antiemetics.  Follow urine culture.  Hold off on urology  consultation for now while she is an inpatient pending clinical course.  She is otherwise alert she is not in any distress no JVD lungs clear heart regular abdomen otherwise soft trace edema     Past Medical History  She has a past medical history of Raghu's disease (Multi), Anemia, Arthritis, Asymptomatic menopausal state, Carotid artery stenosis, Cerebral infarction due to unspecified occlusion or stenosis of unspecified cerebral artery (Multi) (12/20/2021), Cerebral vascular accident (Multi), CKD (chronic kidney disease), Claustrophobia, COVID-19, Decreased coordination, Frequency-urgency syndrome, Gait disturbance, Gout, H/O adrenal disorder, Hyperlipidemia, Hypertension, Other conditions influencing health status, Other tear of lateral meniscus, current injury, unspecified knee, initial encounter (02/21/2020), Personal history of other specified conditions (12/29/2021), Personal history of other specified conditions (01/10/2022), Personal history of other specified conditions (12/29/2021), Personal history of transient ischemic attack (TIA), and cerebral infarction without residual deficits (12/20/2021), Shortness of breath, Unilateral primary osteoarthritis, left knee (03/25/2020), Unspecified kidney failure, Urinary tract infection, Urine retention, Venous stasis, Vertigo, and Wears dentures.    Surgical History  She has a past surgical history that includes Bladder surgery (10/29/2013); Hysterectomy (01/28/2020); Other surgical history (11/24/2021); Other surgical history (Left, 11/24/2021); Other surgical history (11/24/2021); Other surgical history (Left, 03/02/2020); MR angio head wo IV contrast (06/13/2019); MR angio neck wo IV contrast (06/13/2019); CT angio neck (06/18/2019); Adrenal gland surgery; Colonoscopy; and Colonoscopy.     Social History  She reports that she has quit smoking. Her smoking use included cigarettes. She has never used smokeless tobacco. She reports that she does not currently  use alcohol. She reports that she does not use drugs.    Family History  Family History   Problem Relation Name Age of Onset    Hypertension Mother      Atrial fibrillation Mother      Other (pacemaker) Mother      Heart failure Mother      Thyroid disease Mother      Thyroid disease Father      Hypertension Father      Diabetes Father      Lymphoma Father      Skin cancer Father      Colon cancer Sister      Hypertension Brother      Other (bladder cancer) Brother      Coronary artery disease Brother      Thyroid disease Daughter      Other (genital cancer) Paternal Grandmother          Allergies  Bee venom protein (honey bee); Labetalol; Amoxicillin; Aspirin; Influenza virus vaccine, live attenuated; Influenza virus vaccines; Losartan-hydrochlorothiazide; Methylprednisolone; Metoclopramide; Midazolam; Pneumoc 13-becky conj-dip cr(pf); Pneumococcal 23-becky ps vaccine; Pneumococcal 23-valent polysaccharide vaccine; and Sulfa (sulfonamide antibiotics)    Review of Systems   Constitutional:  Negative for fatigue, fever and unexpected weight change.   HENT:  Negative for congestion and sore throat.    Respiratory:  Negative for cough, shortness of breath and wheezing.    Cardiovascular:  Negative for chest pain, palpitations and leg swelling.   Gastrointestinal:  Negative for abdominal pain, constipation, diarrhea and nausea.   Genitourinary:  Negative for dysuria, flank pain and hematuria.   Musculoskeletal:  Negative for arthralgias, joint swelling and myalgias.   Skin:  Negative for rash.   Neurological:  Negative for syncope, light-headedness and headaches.   Psychiatric/Behavioral:  Negative for agitation. The patient is not nervous/anxious.         Physical Exam  Vitals and nursing note reviewed.   Constitutional:       General: She is not in acute distress.     Appearance: She is not ill-appearing or toxic-appearing.   HENT:      Head: Normocephalic and atraumatic.      Mouth/Throat:      Pharynx: No oropharyngeal  "exudate or posterior oropharyngeal erythema.   Eyes:      Extraocular Movements: Extraocular movements intact.      Conjunctiva/sclera: Conjunctivae normal.      Pupils: Pupils are equal, round, and reactive to light.   Cardiovascular:      Rate and Rhythm: Normal rate and regular rhythm.      Pulses: Normal pulses.      Heart sounds: Normal heart sounds. No murmur heard.  Pulmonary:      Effort: Pulmonary effort is normal.      Breath sounds: Normal breath sounds. No wheezing, rhonchi or rales.   Abdominal:      General: Abdomen is flat. Bowel sounds are normal. There is no distension.      Palpations: Abdomen is soft. There is no mass.      Tenderness: There is no abdominal tenderness.      Hernia: No hernia is present.   Musculoskeletal:         General: No swelling or deformity. Normal range of motion.      Cervical back: Normal range of motion and neck supple.   Skin:     General: Skin is warm and dry.      Capillary Refill: Capillary refill takes less than 2 seconds.      Coloration: Skin is not jaundiced.      Findings: No erythema or rash.   Neurological:      General: No focal deficit present.      Mental Status: She is oriented to person, place, and time. Mental status is at baseline.   Psychiatric:         Mood and Affect: Mood normal.         Behavior: Behavior normal.         Thought Content: Thought content normal.         Judgment: Judgment normal.           Last Recorded Vitals  Blood pressure 111/56, pulse 62, temperature 36.4 °C (97.5 °F), resp. rate 16, height 1.721 m (5' 7.75\"), weight 89.4 kg (197 lb), SpO2 97%.    Relevant Results    Scheduled medications  atorvastatin, 20 mg, oral, Nightly  ciprofloxacin, 400 mg, intravenous, q12h  clopidogrel, 75 mg, oral, Daily  enoxaparin, 40 mg, subcutaneous, q24h  gabapentin, 100 mg, oral, Nightly  hydroCHLOROthiazide, 12.5 mg, oral, BID  hydrocortisone, 15 mg, oral, q AM  metoprolol succinate XL, 50 mg, oral, Daily  polyethylene glycol, 17 g, oral, " Daily      Continuous medications  sodium chloride 0.9%, 75 mL/hr, Last Rate: 75 mL/hr (08/25/24 0629)      PRN medications  PRN medications: acetaminophen **OR** acetaminophen **OR** acetaminophen, acetaminophen **OR** acetaminophen **OR** acetaminophen, acetaminophen **AND** diphenhydrAMINE, acetaminophen, famotidine **OR** famotidine, melatonin, ondansetron **OR** ondansetron   Results for orders placed or performed during the hospital encounter of 08/24/24 (from the past 24 hour(s))   CBC   Result Value Ref Range    WBC 7.9 4.4 - 11.3 x10*3/uL    nRBC 0.0 0.0 - 0.0 /100 WBCs    RBC 3.66 (L) 4.00 - 5.20 x10*6/uL    Hemoglobin 11.3 (L) 12.0 - 16.0 g/dL    Hematocrit 33.3 (L) 36.0 - 46.0 %    MCV 91 80 - 100 fL    MCH 30.9 26.0 - 34.0 pg    MCHC 33.9 32.0 - 36.0 g/dL    RDW 12.6 11.5 - 14.5 %    Platelets 214 150 - 450 x10*3/uL   Comprehensive metabolic panel   Result Value Ref Range    Glucose 94 74 - 99 mg/dL    Sodium 135 (L) 136 - 145 mmol/L    Potassium 4.0 3.5 - 5.3 mmol/L    Chloride 104 98 - 107 mmol/L    Bicarbonate 24 21 - 32 mmol/L    Anion Gap 11 10 - 20 mmol/L    Urea Nitrogen 23 6 - 23 mg/dL    Creatinine 1.10 (H) 0.50 - 1.05 mg/dL    eGFR 52 (L) >60 mL/min/1.73m*2    Calcium 9.0 8.6 - 10.3 mg/dL    Albumin 3.5 3.4 - 5.0 g/dL    Alkaline Phosphatase 45 33 - 136 U/L    Total Protein 5.7 (L) 6.4 - 8.2 g/dL    AST 14 9 - 39 U/L    Bilirubin, Total 0.7 0.0 - 1.2 mg/dL    ALT 7 7 - 45 U/L      CT abdomen pelvis w IV contrast    Result Date: 8/24/2024  Interpreted By:  Kellie Watkins, STUDY: CT ABDOMEN PELVIS W IV CONTRAST; 8/24/2024 7:00 am   INDICATION: Signs/Symptoms:RLQ abdominal pain. Nausea. History of urinary bladder surgery, left adrenal gland removal and hysterectomy   COMPARISON: CT abdomen pelvis 03/19/24.   ACCESSION NUMBER(S): [id number]   ORDERING CLINICIAN: DANNIE FORBES   TECHNIQUE: Oral contrast was not administered. 75 ml Omnipaque 350 was injected intravenously. CT of the Abdomen and  Pelvis with intravenous contrast was performed. Axial, sagittal and coronal reformatted images were reviewed.   FINDINGS: Lower Chest: In the left lung base axial image 12 there is a subpleural 5 mm x 3 mm nodule stable since prior exams dating back to at least 06/24/2020 CT chest. Minimal linear scarring in the lung bases.   Abdomen: No intrahepatic biliary distention. Gallbladder unremarkable. The liver, spleen, pancreas, and right adrenal gland appear normal. Left adrenal gland has been removed   Kidneys show no hydronephrosis or renal calculi. Suspected vascular calcification in the right renal hilum. In the upper pole of the right kidney there is a low-density exophytic 5.9 cm structure likely a large right renal cyst similar to the prior exam. In the lower pole of the left kidney there is a 1 cm cortical hypodensity axial image 65 also likely a cyst statistically stable since the prior exam.   No bowel obstruction, free fluid or free air. No adenopathy.   Pelvis: Appendix appears normal. Surgical clip associated with the anterior aspect of the urinary bladder. Urinary bladder otherwise appears normal. Uterus has been removed. No free fluid in the pelvis or adenopathy. No inflammatory changes.   Degenerative changes are noted throughout the lumbar spine with L3-4 lumbar canal stenosis. No compression fracture.       1. No acute abdominal or pelvic abnormalities 2. Prior hysterectomy, bladder suspension and removal of the left adrenal gland 3. Degenerative changes of the lumbar spine with lumbar canal stenosis at L3-4 4. 5 mm left lower lobe lung nodule does not require further follow-up due to its small size per Fleischner criteria and long-term stability. 5. The lung nodule does not require follow-up due to its small size. Nodules less than 6 mm do not require routine follow-up in a low risk individual according to the Fleischner Society 2017 guidelines. 6. There are bilateral renal cortical hypodensities that  are statistically most likely cysts and are similar to the previous exam     Signed by: Kellie Watkins 8/24/2024 7:32 AM Dictation workstation:   [id number]    CT abdomen pelvis w IV contrast    Result Date: 8/24/2024  Interpreted By:  Kellie Watkins, STUDY: CT ABDOMEN PELVIS W IV CONTRAST; 8/24/2024 7:00 am   INDICATION: Signs/Symptoms:RLQ abdominal pain. Nausea. History of urinary bladder surgery, left adrenal gland removal and hysterectomy   COMPARISON: CT abdomen pelvis 03/19/24.   ACCESSION NUMBER(S): [id number]   ORDERING CLINICIAN: DANNIE FORBES   TECHNIQUE: Oral contrast was not administered. 75 ml Omnipaque 350 was injected intravenously. CT of the Abdomen and Pelvis with intravenous contrast was performed. Axial, sagittal and coronal reformatted images were reviewed.   FINDINGS: Lower Chest: In the left lung base axial image 12 there is a subpleural 5 mm x 3 mm nodule stable since prior exams dating back to at least 06/24/2020 CT chest. Minimal linear scarring in the lung bases.   Abdomen: No intrahepatic biliary distention. Gallbladder unremarkable. The liver, spleen, pancreas, and right adrenal gland appear normal. Left adrenal gland has been removed   Kidneys show no hydronephrosis or renal calculi. Suspected vascular calcification in the right renal hilum. In the upper pole of the right kidney there is a low-density exophytic 5.9 cm structure likely a large right renal cyst similar to the prior exam. In the lower pole of the left kidney there is a 1 cm cortical hypodensity axial image 65 also likely a cyst statistically stable since the prior exam.   No bowel obstruction, free fluid or free air. No adenopathy.   Pelvis: Appendix appears normal. Surgical clip associated with the anterior aspect of the urinary bladder. Urinary bladder otherwise appears normal. Uterus has been removed. No free fluid in the pelvis or adenopathy. No inflammatory changes.   Degenerative changes are noted throughout the lumbar  spine with L3-4 lumbar canal stenosis. No compression fracture.       1. No acute abdominal or pelvic abnormalities 2. Prior hysterectomy, bladder suspension and removal of the left adrenal gland 3. Degenerative changes of the lumbar spine with lumbar canal stenosis at L3-4 4. 5 mm left lower lobe lung nodule does not require further follow-up due to its small size per Fleischner criteria and long-term stability. 5. The lung nodule does not require follow-up due to its small size. Nodules less than 6 mm do not require routine follow-up in a low risk individual according to the Fleischner Society 2017 guidelines. 6. There are bilateral renal cortical hypodensities that are statistically most likely cysts and are similar to the previous exam     Signed by: Kellie Watkins 8/24/2024 7:32 AM Dictation workstation:   [id number]           Assessment/Plan   Assessment & Plan  Right lower quadrant abdominal pain    Raghu's disease (Multi)    Benign essential hypertension    CKD (chronic kidney disease)    UTI symptoms    Adrenal insufficiency (Multi)      Principal Problem:    Right lower quadrant abdominal pain  Active Problems:    [place]'s disease (Multi)    Benign essential hypertension    CKD (chronic kidney disease)    UTI symptoms    Adrenal insufficiency (Multi)             I spent   minutes in the professional and overall care of this patient.      Tim Felix MD

## 2024-08-26 LAB
ALBUMIN SERPL BCP-MCNC: 3.6 G/DL (ref 3.4–5)
ANION GAP SERPL CALC-SCNC: 10 MMOL/L (ref 10–20)
ANION GAP SERPL CALC-SCNC: 11 MMOL/L (ref 10–20)
APPEARANCE UR: CLEAR
BACTERIA UR CULT: ABNORMAL
BILIRUB UR STRIP.AUTO-MCNC: NEGATIVE MG/DL
BUN SERPL-MCNC: 19 MG/DL (ref 6–23)
BUN SERPL-MCNC: 22 MG/DL (ref 6–23)
CALCIUM SERPL-MCNC: 8.9 MG/DL (ref 8.6–10.3)
CALCIUM SERPL-MCNC: 9.2 MG/DL (ref 8.6–10.3)
CHLORIDE SERPL-SCNC: 103 MMOL/L (ref 98–107)
CHLORIDE SERPL-SCNC: 106 MMOL/L (ref 98–107)
CO2 SERPL-SCNC: 24 MMOL/L (ref 21–32)
CO2 SERPL-SCNC: 25 MMOL/L (ref 21–32)
COLOR UR: COLORLESS
CREAT SERPL-MCNC: 1.09 MG/DL (ref 0.5–1.05)
CREAT SERPL-MCNC: 1.21 MG/DL (ref 0.5–1.05)
EGFRCR SERPLBLD CKD-EPI 2021: 47 ML/MIN/1.73M*2
EGFRCR SERPLBLD CKD-EPI 2021: 53 ML/MIN/1.73M*2
ERYTHROCYTE [DISTWIDTH] IN BLOOD BY AUTOMATED COUNT: 12.8 % (ref 11.5–14.5)
GLUCOSE SERPL-MCNC: 101 MG/DL (ref 74–99)
GLUCOSE SERPL-MCNC: 143 MG/DL (ref 74–99)
GLUCOSE UR STRIP.AUTO-MCNC: NORMAL MG/DL
HCT VFR BLD AUTO: 32.3 % (ref 36–46)
HGB BLD-MCNC: 11 G/DL (ref 12–16)
HOLD SPECIMEN: NORMAL
KETONES UR STRIP.AUTO-MCNC: NEGATIVE MG/DL
LEUKOCYTE ESTERASE UR QL STRIP.AUTO: NEGATIVE
MCH RBC QN AUTO: 31.3 PG (ref 26–34)
MCHC RBC AUTO-ENTMCNC: 34.1 G/DL (ref 32–36)
MCV RBC AUTO: 92 FL (ref 80–100)
NITRITE UR QL STRIP.AUTO: NEGATIVE
NRBC BLD-RTO: 0 /100 WBCS (ref 0–0)
PH UR STRIP.AUTO: 5.5 [PH]
PHOSPHATE SERPL-MCNC: 3.3 MG/DL (ref 2.5–4.9)
PLATELET # BLD AUTO: 197 X10*3/UL (ref 150–450)
POTASSIUM SERPL-SCNC: 4.1 MMOL/L (ref 3.5–5.3)
POTASSIUM SERPL-SCNC: 4.4 MMOL/L (ref 3.5–5.3)
PROT UR STRIP.AUTO-MCNC: NEGATIVE MG/DL
RBC # BLD AUTO: 3.52 X10*6/UL (ref 4–5.2)
RBC # UR STRIP.AUTO: NEGATIVE /UL
SODIUM SERPL-SCNC: 134 MMOL/L (ref 136–145)
SODIUM SERPL-SCNC: 137 MMOL/L (ref 136–145)
SP GR UR STRIP.AUTO: 1.01
UROBILINOGEN UR STRIP.AUTO-MCNC: NORMAL MG/DL
WBC # BLD AUTO: 7 X10*3/UL (ref 4.4–11.3)

## 2024-08-26 PROCEDURE — 2500000004 HC RX 250 GENERAL PHARMACY W/ HCPCS (ALT 636 FOR OP/ED): Performed by: INTERNAL MEDICINE

## 2024-08-26 PROCEDURE — 2500000004 HC RX 250 GENERAL PHARMACY W/ HCPCS (ALT 636 FOR OP/ED): Performed by: FAMILY MEDICINE

## 2024-08-26 PROCEDURE — 2500000002 HC RX 250 W HCPCS SELF ADMINISTERED DRUGS (ALT 637 FOR MEDICARE OP, ALT 636 FOR OP/ED): Performed by: FAMILY MEDICINE

## 2024-08-26 PROCEDURE — 80069 RENAL FUNCTION PANEL: CPT | Performed by: FAMILY MEDICINE

## 2024-08-26 PROCEDURE — 36415 COLL VENOUS BLD VENIPUNCTURE: CPT | Performed by: FAMILY MEDICINE

## 2024-08-26 PROCEDURE — 85027 COMPLETE CBC AUTOMATED: CPT | Performed by: FAMILY MEDICINE

## 2024-08-26 PROCEDURE — 99233 SBSQ HOSP IP/OBS HIGH 50: CPT | Performed by: INTERNAL MEDICINE

## 2024-08-26 PROCEDURE — 2500000001 HC RX 250 WO HCPCS SELF ADMINISTERED DRUGS (ALT 637 FOR MEDICARE OP): Performed by: FAMILY MEDICINE

## 2024-08-26 PROCEDURE — 97161 PT EVAL LOW COMPLEX 20 MIN: CPT | Mod: GP | Performed by: PHYSICAL THERAPIST

## 2024-08-26 PROCEDURE — 97165 OT EVAL LOW COMPLEX 30 MIN: CPT | Mod: GO

## 2024-08-26 PROCEDURE — 81003 URINALYSIS AUTO W/O SCOPE: CPT | Performed by: INTERNAL MEDICINE

## 2024-08-26 PROCEDURE — 1100000001 HC PRIVATE ROOM DAILY

## 2024-08-26 PROCEDURE — 80048 BASIC METABOLIC PNL TOTAL CA: CPT | Mod: CCI | Performed by: INTERNAL MEDICINE

## 2024-08-26 PROCEDURE — 2500000002 HC RX 250 W HCPCS SELF ADMINISTERED DRUGS (ALT 637 FOR MEDICARE OP, ALT 636 FOR OP/ED): Performed by: INTERNAL MEDICINE

## 2024-08-26 RX ORDER — HYDROCHLOROTHIAZIDE 25 MG/1
25 TABLET ORAL DAILY
Status: DISCONTINUED | OUTPATIENT
Start: 2024-08-27 | End: 2024-08-28 | Stop reason: HOSPADM

## 2024-08-26 RX ORDER — HYDROCORTISONE 5 MG/1
5 TABLET ORAL DAILY
Status: DISCONTINUED | OUTPATIENT
Start: 2024-08-27 | End: 2024-08-28 | Stop reason: HOSPADM

## 2024-08-26 RX ORDER — HYDROCORTISONE 5 MG/1
5 TABLET ORAL NIGHTLY
Status: DISCONTINUED | OUTPATIENT
Start: 2024-08-26 | End: 2024-08-28 | Stop reason: HOSPADM

## 2024-08-26 RX ORDER — MORPHINE SULFATE 4 MG/ML
4 INJECTION, SOLUTION INTRAMUSCULAR; INTRAVENOUS EVERY 6 HOURS PRN
Status: DISCONTINUED | OUTPATIENT
Start: 2024-08-26 | End: 2024-08-28 | Stop reason: HOSPADM

## 2024-08-26 RX ADMIN — TRAMADOL HYDROCHLORIDE 50 MG: 50 TABLET ORAL at 18:47

## 2024-08-26 RX ADMIN — ENOXAPARIN SODIUM 40 MG: 40 INJECTION SUBCUTANEOUS at 16:00

## 2024-08-26 RX ADMIN — CIPROFLOXACIN 400 MG: 400 INJECTION, SOLUTION INTRAVENOUS at 08:37

## 2024-08-26 RX ADMIN — ACETAMINOPHEN 650 MG: 325 TABLET ORAL at 04:07

## 2024-08-26 RX ADMIN — HYDROCHLOROTHIAZIDE 12.5 MG: 25 TABLET ORAL at 09:00

## 2024-08-26 RX ADMIN — ONDANSETRON 4 MG: 2 INJECTION INTRAMUSCULAR; INTRAVENOUS at 05:44

## 2024-08-26 RX ADMIN — CIPROFLOXACIN 400 MG: 400 INJECTION, SOLUTION INTRAVENOUS at 21:25

## 2024-08-26 RX ADMIN — DIPHENHYDRAMINE HYDROCHLORIDE 25 MG: 25 TABLET ORAL at 21:26

## 2024-08-26 RX ADMIN — ACETAMINOPHEN 650 MG: 325 TABLET ORAL at 10:31

## 2024-08-26 RX ADMIN — MORPHINE SULFATE 4 MG: 4 INJECTION, SOLUTION INTRAMUSCULAR; INTRAVENOUS at 23:03

## 2024-08-26 RX ADMIN — METOPROLOL SUCCINATE 50 MG: 50 TABLET, EXTENDED RELEASE ORAL at 08:36

## 2024-08-26 RX ADMIN — TRAMADOL HYDROCHLORIDE 50 MG: 50 TABLET ORAL at 05:44

## 2024-08-26 RX ADMIN — ACETAMINOPHEN 650 MG: 325 SOLUTION ORAL at 21:26

## 2024-08-26 RX ADMIN — ATORVASTATIN CALCIUM 20 MG: 20 TABLET, FILM COATED ORAL at 21:26

## 2024-08-26 RX ADMIN — HYDROCORTISONE 15 MG: 5 TABLET ORAL at 08:36

## 2024-08-26 RX ADMIN — CLOPIDOGREL BISULFATE 75 MG: 75 TABLET, FILM COATED ORAL at 08:36

## 2024-08-26 RX ADMIN — MORPHINE SULFATE 4 MG: 4 INJECTION, SOLUTION INTRAMUSCULAR; INTRAVENOUS at 13:09

## 2024-08-26 RX ADMIN — HYDROCORTISONE 5 MG: 5 TABLET ORAL at 23:26

## 2024-08-26 RX ADMIN — GABAPENTIN 100 MG: 100 CAPSULE ORAL at 21:26

## 2024-08-26 RX ADMIN — SODIUM CHLORIDE 75 ML/HR: 9 INJECTION, SOLUTION INTRAVENOUS at 04:07

## 2024-08-26 ASSESSMENT — COGNITIVE AND FUNCTIONAL STATUS - GENERAL
MOBILITY SCORE: 22
MOBILITY SCORE: 24
WALKING IN HOSPITAL ROOM: A LITTLE
CLIMB 3 TO 5 STEPS WITH RAILING: A LITTLE
DAILY ACTIVITIY SCORE: 24
DAILY ACTIVITIY SCORE: 24

## 2024-08-26 ASSESSMENT — ENCOUNTER SYMPTOMS
CHEST TIGHTNESS: 0
FEVER: 0
ACTIVITY CHANGE: 0
FLANK PAIN: 1
BLOOD IN STOOL: 0
HEMATURIA: 0
EYE REDNESS: 0
JOINT SWELLING: 0
PALPITATIONS: 0
SPEECH DIFFICULTY: 0
STRIDOR: 0
LIGHT-HEADEDNESS: 0

## 2024-08-26 ASSESSMENT — ACTIVITIES OF DAILY LIVING (ADL)
BATHING_ASSISTANCE: INDEPENDENT
LACK_OF_TRANSPORTATION: NO

## 2024-08-26 ASSESSMENT — PAIN DESCRIPTION - ORIENTATION
ORIENTATION: LOWER
ORIENTATION: LOWER

## 2024-08-26 ASSESSMENT — PAIN SCALES - WONG BAKER
WONGBAKER_NUMERICALRESPONSE: HURTS WORST
WONGBAKER_NUMERICALRESPONSE: HURTS EVEN MORE
WONGBAKER_NUMERICALRESPONSE: HURTS LITTLE MORE
WONGBAKER_NUMERICALRESPONSE: HURTS LITTLE BIT

## 2024-08-26 ASSESSMENT — PAIN SCALES - GENERAL
PAINLEVEL_OUTOF10: 5 - MODERATE PAIN
PAINLEVEL_OUTOF10: 3
PAINLEVEL_OUTOF10: 5 - MODERATE PAIN
PAINLEVEL_OUTOF10: 8
PAINLEVEL_OUTOF10: 8
PAINLEVEL_OUTOF10: 5 - MODERATE PAIN
PAINLEVEL_OUTOF10: 7
PAINLEVEL_OUTOF10: 10 - WORST POSSIBLE PAIN
PAINLEVEL_OUTOF10: 5 - MODERATE PAIN
PAINLEVEL_OUTOF10: 5 - MODERATE PAIN
PAINLEVEL_OUTOF10: 4

## 2024-08-26 ASSESSMENT — PAIN - FUNCTIONAL ASSESSMENT
PAIN_FUNCTIONAL_ASSESSMENT: 0-10

## 2024-08-26 ASSESSMENT — PAIN DESCRIPTION - LOCATION
LOCATION: ABDOMEN
LOCATION: ABDOMEN

## 2024-08-26 NOTE — PROGRESS NOTES
08/26/24 1320   Discharge Planning   Living Arrangements Alone   Support Systems Spouse/significant other  (reports will stay with her elis to assisst her on discharge)   Type of Residence Private residence   Who is requesting discharge planning? Provider   Expected Discharge Disposition Home   Does the patient need discharge transport arranged? No   Financial Resource Strain   How hard is it for you to pay for the very basics like food, housing, medical care, and heating? Not hard   Housing Stability   In the last 12 months, was there a time when you were not able to pay the mortgage or rent on time? N   At any time in the past 12 months, were you homeless or living in a shelter (including now)? N   Transportation Needs   In the past 12 months, has lack of transportation kept you from medical appointments or from getting medications? no   In the past 12 months, has lack of transportation kept you from meetings, work, or from getting things needed for daily living? No     Pt denies dc needs. Plan for dc home or to fiancee home if assistance needed. Reports good support system. No dc needs identified.

## 2024-08-26 NOTE — PROGRESS NOTES
"Daysi Resendiz, [facility] 74 y.o. female was seen today:   Chief Complaint   Patient presents with    Flank Pain     The patient has been having lower  right side abdominal pain and lower right side back pain and it is radiating down her right leg for the past 4 days. The patient states she is also having UTI symptoms        VISIT BILL:  Daysi comes here for right-sided flank pain.  In the past she has UTI.  It takes longer for her to handle any infectious conditions patient does have [place]'s disease for which she is immunosuppressant.  She is on chronic steroid.  She denies any fever or chills.  She is nauseous but did not have any vomiting.  She denies any blood in the urine.  However her urine is foul-smelling.  Today in the office urine dipstick definitely shows positive nitrite, leukoesterase.  Due to her medical conditions I suggested that patient should go to the emergency department for IV antibiotic.  She had multiple similar UTI which has multidrug resistance.  She infected recently had 2 bouts of antibiotic back-to-back ciprofloxacin and nitrofurantoin.  She is getting another infection after those 2 antibiotics.  Patient wanted to try at least next 48  to 72 hours at home with oral antibiotic.  She was encouraged to drink enough water to flush her system and empty her bladder frequently.    Flank Pain  Pertinent negatives include no chest pain or fever.     MEDICATIONS:   Current Outpatient Medications   Medication Instructions    acetaminophen (TYLENOL 8 HOUR) 1,300 mg, oral, Daily, Do not crush, chew, or split.  Take in the morning.    atorvastatin (LIPITOR) 20 mg, oral, Nightly    BD SafetyGlide Needle 25 gauge x 1\" needle USE WITH SOLUCORTEF FOR ADRENAL CRISIS.    ciprofloxacin (CIPRO) 500 mg, oral, 2 times daily    clopidogrel (PLAVIX) 75 mg, oral, Daily    cyanocobalamin, vitamin B-12, (VITAMIN B-12 INJ) injection, As needed, EVERY 3-6 MONTHS WHEN NEEDED BASED OFF LAB WORK    " diphenhydrAMINE-acetaminophen (Tylenol PM)  mg per tablet 1 tablet, oral, Nightly PRN    gabapentin (NEURONTIN) 100 mg, oral, Nightly    hydroCHLOROthiazide (MICROZIDE) 12.5 mg, oral, 2 times daily    hydrocortisone (CORTEF) 15 mg, oral, Every morning,  TAKE ONE AND A HALF TABLETS BY MOUTH IN THE MORNING THEN TAKE HALF A TABLET AT 3PM THEN TAKE HALF A TABLET AT BEDTIME    metoprolol succinate XL (TOPROL-XL) 50 mg, oral, Daily    Solu-CORTEF Act-O-Vial, PF, 100 mg/2 mL injection if needed. FOR EMERGENCY     TODAY'S VISIT  DX:     1. Pyelonephritis  ciprofloxacin (Cipro) 500 mg tablet      2. Urinary symptom or sign  Urine Culture    Office urine dipstick shows positive UTI        Urine Culture    Urinalysis with Reflex Microscopic    Microscopic Only, Urine      3. [place]'s disease (Multi)  On chronic steroids, immunosuppression           MEDICAL DECISION MAKING:  - The current and active medical co morbidities have been considered.   - Recent lab work and relevant imaging studies have been reviewed.    - Relevant correspondence/notes from other specialty consultants were reviewed.    - Medication have been sent for refill.    -Therapy and treatment as per above plan  - Next Follow up in 2 weeks with PCP      Review of Systems   Constitutional:  Negative for activity change and fever.   HENT:  Negative for hearing loss, nosebleeds and tinnitus.    Eyes:  Negative for redness.   Respiratory:  Negative for chest tightness and stridor.    Cardiovascular:  Negative for chest pain, palpitations and leg swelling.   Gastrointestinal:  Negative for blood in stool.   Endocrine: Negative for cold intolerance.   Genitourinary:  Positive for flank pain. Negative for hematuria.   Musculoskeletal:  Negative for joint swelling.   Skin:  Negative for rash.   Neurological:  Negative for speech difficulty and light-headedness.   Psychiatric/Behavioral:  Negative for behavioral problems.      Visit Vitals  /80 (BP  Location: Left arm, Patient Position: Sitting, BP Cuff Size: Adult)   Pulse 70   Temp 35.6 °C (96.1 °F) (Temporal)   Wt 90.3 kg (199 lb)   SpO2 97%   BMI 31.17 kg/m²   Smoking Status Former   BSA 2.07 m²         Wt Readings from Last 10 Encounters:   08/24/24 89.4 kg (197 lb)   08/23/24 90.3 kg (199 lb)   07/02/24 91.2 kg (201 lb)   05/09/24 90.3 kg (199 lb)   05/06/24 89.1 kg (196 lb 6.4 oz)   04/05/24 89.6 kg (197 lb 9.6 oz)   02/28/24 89.8 kg (198 lb)   01/30/24 89.8 kg (198 lb)   01/16/24 86.6 kg (191 lb)   01/10/24 89.1 kg (196 lb 6.4 oz)     Physical Exam  Constitutional:       General: She is not in acute distress.     Appearance: Normal appearance.   HENT:      Head: Normocephalic.      Right Ear: Tympanic membrane normal.      Left Ear: Tympanic membrane normal.      Mouth/Throat:      Mouth: Mucous membranes are moist.   Cardiovascular:      Rate and Rhythm: Normal rate and regular rhythm.      Heart sounds: No murmur heard.  Pulmonary:      Effort: No respiratory distress.   Abdominal:      Palpations: Abdomen is soft.   Musculoskeletal:      Cervical back: Neck supple.      Right lower leg: No edema.      Left lower leg: No edema.   Skin:     Findings: No rash.   Neurological:      General: No focal deficit present.      Mental Status: She is alert and oriented to person, place, and time.   Psychiatric:         Mood and Affect: Mood normal.        RECENT LABS:  Lab Results   Component Value Date    WBC 7.9 08/25/2024    HGB 11.3 (L) 08/25/2024    HCT 33.3 (L) 08/25/2024     08/25/2024    CHOL 153 05/10/2022    TRIG 113 05/10/2022    HDL 49.0 05/10/2022    ALT 7 08/25/2024    AST 14 08/25/2024     (L) 08/25/2024    K 4.0 08/25/2024     08/25/2024    CREATININE 1.10 (H) 08/25/2024    BUN 23 08/25/2024    CO2 24 08/25/2024    TSH 2.90 02/08/2024    INR 1.0 06/24/2020    HGBA1C 5.5 06/08/2021     Lab Results   Component Value Date    GLUCOSE 94 08/25/2024    CALCIUM 9.0 08/25/2024    NA  "135 (L) 08/25/2024    K 4.0 08/25/2024    CO2 24 08/25/2024     08/25/2024    BUN 23 08/25/2024    CREATININE 1.10 (H) 08/25/2024      No results found for: \"LDLCALC\"  Lab Results   Component Value Date    HGBA1C 5.5 06/08/2021     IMAGING:  === 05/08/23 ===  ANKLE  Small avulsion injury at the tip of the lateral malleolus without  discrete fracture line. Lateral malleolar soft tissue swelling     === 08/24/24 ===  CT ABDOMEN PELVIS W IV CONTRAST  1. No acute abdominal or pelvic abnormalities  2. Prior hysterectomy, bladder suspension and removal of the left  adrenal gland  3. Degenerative changes of the lumbar spine with lumbar canal  stenosis at L3-4  4. 5 mm left lower lobe lung nodule does not require further  follow-up due to its small size per Fleischner criteria and long-term  stability.  5. The lung nodule does not require follow-up due to its small size.  Nodules less than 6 mm do not require routine follow-up in a low risk  individual according to the Fleischner Society 2017 guidelines.  6. There are bilateral renal cortical hypodensities that are  statistically most likely cysts and are similar to the previous exam    === 05/01/24 ===  MR ABDOMEN W AND WO CONTRAST  Benign renal cysts including dominant 5.5 cm right upper pole Bosniak  2 cyst with thin internal septa. No solid renal mass.    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 No worrisome features. Consider  follow-up in 2 years based on lesion size and patient's age per ACR  guidelines.    "

## 2024-08-26 NOTE — PROGRESS NOTES
Occupational Therapy    Evaluation    Patient Name: Daysi Resendiz  MRN: [id number]  Today's Date: 8/26/2024  Time Calculation  Start Time: 1051  Stop Time: 1103  Time Calculation (min): 12 min        Assessment:  OT Assessment: Independent/Modified independent. No skilled OT needs at this time.  Prognosis: Good  Barriers to Discharge: None  Evaluation/Treatment Tolerance: Patient tolerated treatment well  End of Session Communication: Bedside nurse  End of Session Patient Position: Alarm off, not on at start of session, Bed, 2 rail up  Prognosis: Good  Barriers to Discharge: None  Evaluation/Treatment Tolerance: Patient tolerated treatment well  Plan:  No Skilled OT: No acute OT goals identified  OT Frequency: OT eval only  OT Discharge Recommendations: No further acute OT  OT - OK to Discharge: Yes (when medically stable.)     Subjective   Current Problem:  1. Right lower quadrant abdominal pain        2. Cystitis          General:  General  Reason for Referral: ADL impairment  Referred By: Elvira OT/PT 8/24  Past Medical History Relevant to Rehab: dylipidemia, HTN, CKD, vertigo, gait disturbance, adrenal gland neoplasm, [place]'s desease, gout  Family/Caregiver Present: No  Co-Treatment: PT  Co-Treatment Reason: to maximize pt. safety  Prior to Session Communication: Bedside nurse  Patient Position Received: Bed, 2 rail up, Alarm off, not on at start of session  General Comment: Pt. is 73 y/o female to ED 8/24 with c/o abdominal pain. Pt. has been on medication for recent UTI without relief. CT abdomen: no acute findings.  Precautions:  Precautions Comment: IV; telemetry    Pain:  Pain Assessment  Pain Assessment: 0-10  0-10 (Numeric) Pain Score: 5 - Moderate pain  Pain Type: Acute pain  Pain Location:  (R side back wrapping to front and occasionally radiates down R leg)    Objective   Cognition:  Overall Cognitive Status: Within Functional Limits    Home Living:  Home Living Comments: Pt. states that she lives  alone, but spends a lot of time at her fiances house. Pt. house is two story, 5 [address] with HR. has bedroom on the main floor, bathroom upstairs. claw foot tub, no DME. [place] house is two story, full bath downstairs and bedroom upstairs.  Prior Function:  Prior Function Comments: Independent with ADLs/IADLs. No AD use, does not own AD. No falls. drives.    ADL:  Eating Assistance: Independent  Grooming Assistance: Independent  Bathing Assistance: Independent  UE Dressing Assistance: Independent  LE Dressing Assistance: Independent  LE Dressing Deficit:  (donning B socks EOB)  Toileting Assistance with Device: Independent  Activity Tolerance:  Endurance: Endurance does not limit participation in activity  Bed Mobility/Transfers: Bed Mobility  Bed Mobility: Yes  Bed Mobility 1  Bed Mobility 1: Supine to sitting  Level of Assistance 1: Modified independent  Bed Mobility Comments 1: head of bed elevated    Transfers  Transfer: Yes  Transfer 1  Technique 1: Sit to stand, Stand to sit  Transfer Level of Assistance 1: Independent  Trials/Comments 1: no AD use; independent from EOB    Functional Mobility:  Functional Mobility  Functional Mobility Performed:  (SBA with period of CGA for minimal LOB when turning. No AD use. ~75 feet.)    Standing Balance:  Dynamic Standing Balance  Dynamic Standing-Comments: Fair +, brief loss of balance when turning. able to correct with CGA for safety     Strength:  Strength Comments: Adama MINL MMT    Coordination:  Coordination Comment: WNL   Hand Function:  Gross Grasp: Functional  Extremities: RUE   RUE : Within Functional Limits and LUE   LUE: Within Functional Limits    Outcome Measures:[facility] Daily Activity  Putting on and taking off regular lower body clothing: None  Bathing (including washing, rinsing, drying): None  Putting on and taking off regular upper body clothing: None  Toileting, which includes using toilet, bedpan or urinal: None  Taking care of personal grooming such as  brushing teeth: None  Eating Meals: None  Daily Activity - Total Score: 24      Education Documentation  ADL Training, taught by Alice Wild OT at 8/26/2024 11:41 AM.  Learner: Patient  Readiness: Acceptance  Method: Explanation  Response: Verbalizes Understanding, Demonstrated Understanding

## 2024-08-26 NOTE — PROGRESS NOTES
Physical Therapy    Physical Therapy Evaluation    Patient Name: Daysi Resendiz  MRN: [id number]  Today's Date: 8/26/2024   Time Calculation  Start Time: 1052  Stop Time: 1103  Time Calculation (min): 11 min  1115/1115-A    Assessment/Plan   PT Assessment  PT Assessment Results: Impaired balance  Rehab Prognosis: Good  Evaluation/Treatment Tolerance: Patient tolerated treatment well  Medical Staff Made Aware: Yes  Strengths: Support of Caregivers, Living arrangement secure  Barriers to Participation: Comorbidities  End of Session Communication: Bedside nurse  Assessment Comment: Recommend continued therapy in acute care followed by continued therapy at a low intensity level following D/C. May benefit from AD.  End of Session Patient Position: Alarm off, not on at start of session, Bed, 2 rail up (Call light within reach)  IP OR SWING BED PT PLAN  Inpatient or Swing Bed: Inpatient  PT Plan  Treatment/Interventions: Gait training, Balance training, Stair training  PT Plan: Ongoing PT  PT Frequency: 2 times per week  PT Discharge Recommendations: Low intensity level of continued care  Equipment Recommended upon Discharge:  (TBD)  PT Recommended Transfer Status: Assist x1  Physical Therapy eval completed per MD requisition. P.T. recommendations as outlined above. Recommend D/C from acute care when medically appropriate as deemed by medical staff.    Subjective       General Visit Information:  General  Reason for Referral: impaired mobility  Referred By: Dr. Felix (OT/PT 8/24)  Past Medical History Relevant to Rehab: includes: HLD, HTN, CKD, vertigo, gait disturbance, adrenal gland neoplasm, [place]'s desease, gout  Family/Caregiver Present: No  Co-Treatment: OT  Co-Treatment Reason: Pt. seen with OT to maximize safety and function  Prior to Session Communication: Bedside nurse  Patient Position Received: Bed, 2 rail up, Alarm off, not on at start of session  Preferred Learning Style: auditory, verbal  General  Comment: Pt. is a 73 yo who [place] [facility] [place] with c/o abdominal pain. Pt. has been on medication for recent UTI without relief.   ABD CT (8/24) (-) acute findings.   Hgb (8/26) 11.0 trending down    Dx: RLQ abdominal pain    Home Living:  Home Living  Home Living Comments: Pt. states that she lives alone, but spends a lot of time at her tye house. Pt. house is 2 level house with 5 [address] with HR. Pt. has bedroom on the main floor, full bathroom upstairs with claw foot tub, no DME. [facility] house is 2 level house with full bath downstairs and bedroom upstairs.    Prior Level of Function:  Prior Function Per Pt/Caregiver Report  Prior Function Comments: Independent with ADLs/IADLs. No AD use, does not own AD. No falls. drives.    Precautions:  Precautions  Medical Precautions:  (Activity order: No restrictions)  Precautions Comment: Per EMR: Medium fall risk         Objective     Pain:  Pain Assessment  Pain Assessment: 0-10  0-10 (Numeric) Pain Score: 5 - Moderate pain  Pain Type: Acute pain  Pain Location:  (RLQ)    Cognition:  Cognition  Overall Cognitive Status: Within Functional Limits    General Assessments:      Activity Tolerance  Endurance: Endurance does not limit participation in activity                 Dynamic Sitting Balance  Dynamic Sitting-Comments: Good static and dynamic sitting balance  Dynamic Standing Balance  Dynamic Standing-Comments: Good static and Good- dynamic standing balance    Functional Assessments:     Bed Mobility  Bed Mobility: Yes  Bed Mobility 1  Bed Mobility 1: Supine to sitting, Sitting to supine  Level of Assistance 1: Modified independent  Transfers  Transfer: Yes  Transfer 1  Technique 1: Sit to stand, Stand to sit  Transfer Device 1:  (No AD)  Transfer Level of Assistance 1: Independent  Trials/Comments 1: A for lifting, transferring weight over feet, steadying. VC's for hand placemen  Ambulation/Gait Training  Ambulation/Gait Training Performed:  Yes  Ambulation/Gait Training 1  Surface 1: Level tile  Device 1: No device  Assistance 1: Contact guard  Quality of Gait 1: Narrow base of support (slow, reciprocal gait with increased lateral trunk sway, occasional scissoring and 1 LOB that required A to recover)  Comments/Distance (ft) 1: 75', CGA for safety.  Stairs  Stairs: No       Extremity/Trunk Assessments:        RLE   RLE : Within Functional Limits  LLE   LLE : Within Functional Limits    Outcome Measures:     [facility] Basic Mobility  Turning from your back to your side while in a flat bed without using bedrails: None  Moving from lying on your back to sitting on the side of a flat bed without using bedrails: None  Moving to and from bed to chair (including a wheelchair): None  Standing up from a chair using your arms (e.g. wheelchair or bedside chair): None  To walk in hospital room: A little  Climbing 3-5 steps with railing: A little  Basic Mobility - Total Score: 22                                                             Goals:  Encounter Problems       Encounter Problems (Active)       PT Problem       Pt.will ambulate 150' with safest and least restrictive assistive device with MOD I (Progressing)       Start:  08/26/24    Expected End:  09/09/24            Pt. will amb up/down 5 steps with B HR with SBA  (Not Progressing)       Start:  08/26/24    Expected End:  09/09/24            Pt. will perform 2 x 15 B LE AROM exercises  (Not Progressing)       Start:  08/26/24    Expected End:  09/09/24                   Education Documentation  Mobility Training, taught by Alvino Murrieta, PT at 8/26/2024  1:19 PM.  Learner: Patient  Readiness: Acceptance  Method: Explanation  Response: Verbalizes Understanding, Needs Reinforcement  Comment: Role of PT, transfers, amb, safety, PT POC

## 2024-08-26 NOTE — CONSULTS
[facility]  Consult Note           Reason for Consult: Right flank pain UTI chronic kidney disease stage III  Requesting Physician:  Dr. Vinnie Gonzales MD      Chief Complaint: Right abdominal and flank pain  History Obtained From:  patient, electronic medical record    History of Present Ilness:    74 y.o. year old female who presented initially to her primary care for further evaluation and management of relatively acute onset and severe progressive course of pain described as right lower quadrant and radiates to the right flank not associated with dysuria fever or chills patient felt that this is could be a UTI he did receive outpatient antibiotic that failed and patient was advised to come to the hospital for possible UTI complicated with right pyelonephritis CT scan with IV contrast was done did not show any obstructive uropathy nephrolithiasis or evidence suggesting pyelonephritis she does have stable cysts with no change compared to prior study  Patient did have a recent MRI ordered by me there was benign renal cysts that has not been changed compared to the colon CT scan  Patient does have chronic comorbidity of [place]'s disease and history of multiple UTI her baseline creatinine fluctuated between 0.9 and 1.2 she does consume enough fluids  Past Medical History:    Past Medical History:   Diagnosis Date    [place]'s disease (Multi)     Anemia     Arthritis     Asymptomatic menopausal state     Post-menopausal    Carotid artery stenosis     Cerebral infarction due to unspecified occlusion or stenosis of unspecified cerebral artery (Multi) 12/20/2021    Right pontine stroke    Cerebral vascular accident (Multi)     MILD LEFT SIDE WEAKNESS    CKD (chronic kidney disease)     STAGE 3A    Claustrophobia     COVID-19     VACCINATED    Decreased coordination     Frequency-urgency syndrome     Gait disturbance     Gout     H/O adrenal disorder     Hyperlipidemia     Hypertension     Other conditions  influencing health status     Stroke syndrome    Other tear of lateral meniscus, current injury, unspecified knee, initial encounter 02/21/2020    Lateral meniscal tear    Personal history of other specified conditions 12/29/2021    History of headache    Personal history of other specified conditions 01/10/2022    History of fever    Personal history of other specified conditions 12/29/2021    History of fatigue    Personal history of transient ischemic attack (TIA), and cerebral infarction without residual deficits 12/20/2021    History of transient ischemic attack    Shortness of breath     Unilateral primary osteoarthritis, left knee 03/25/2020    Left knee DJD    Unspecified kidney failure     Renal failure    Urinary tract infection     Urine retention     Venous stasis     BILATERAL    Vertigo     Wears dentures         Past Surgical History:    Past Surgical History:   Procedure Laterality Date    ADRENAL GLAND SURGERY      LEFT ADRENAL GLAND REMOVED    BLADDER SURGERY  10/29/2013    Bladder Surgery    COLONOSCOPY      COLONOSCOPY      CT ANGIO NECK  06/18/2019    CT NECK ANGIO W AND WO IV CONTRAST 6/18/2019 [facility]    HYSTERECTOMY  01/28/2020    Hysterectomy    MR HEAD ANGIO WO IV CONTRAST  06/13/2019    MR HEAD ANGIO WO IV CONTRAST 6/13/2019 [facility]    MR NECK ANGIO WO IV CONTRAST  06/13/2019    MR NECK ANGIO WO IV CONTRAST 6/13/2019 [facility]    OTHER SURGICAL HISTORY  11/24/2021    Varicose vein ligation    OTHER SURGICAL HISTORY Left 11/24/2021    Knee replacement    OTHER SURGICAL HISTORY  11/24/2021    Colonoscopy    OTHER SURGICAL HISTORY Left 03/02/2020    Knee arthroscopy        Home Medications:    No current facility-administered medications on file prior to encounter.     Current Outpatient Medications on File Prior to Encounter   Medication Sig Dispense Refill    acetaminophen (Tylenol 8 HOUR) 650 mg ER tablet Take 2 tablets (1,300 mg) by mouth once daily.  "Do not crush, chew, or split.  Take in the morning.      atorvastatin (Lipitor) 20 mg tablet Take 1 tablet (20 mg) by mouth once daily at bedtime. 90 tablet 3    ciprofloxacin (Cipro) 500 mg tablet Take 1 tablet (500 mg) by mouth 2 times a day for 7 days. 14 tablet 0    clopidogrel (Plavix) 75 mg tablet Take 1 tablet (75 mg) by mouth once daily. 90 tablet 1    hydroCHLOROthiazide (Microzide) 12.5 mg tablet TAKE 1 TABLET BY MOUTH TWICE A  tablet 1    hydrocortisone (Cortef) 10 mg tablet Take 1.5 tablets (15 mg) by mouth once daily in the morning.  TAKE ONE AND A HALF TABLETS BY MOUTH IN THE MORNING THEN TAKE HALF A TABLET AT 3PM THEN TAKE HALF A TABLET AT BEDTIME 270 tablet 1    metoprolol succinate XL (Toprol-XL) 25 mg 24 hr tablet Take 2 tablets (50 mg) by mouth once daily. 90 tablet 3    BD SafetyGlide Needle 25 gauge x 1\" needle USE WITH SOLUCORTEF FOR ADRENAL CRISIS.      cyanocobalamin, vitamin B-12, (VITAMIN B-12 INJ) Inject as directed if needed. EVERY 3-6 MONTHS WHEN NEEDED BASED OFF LAB WORK      diphenhydrAMINE-acetaminophen (Tylenol PM)  mg per tablet Take 1 tablet by mouth as needed at bedtime for sleep.      gabapentin (Neurontin) 100 mg capsule Take 1 capsule (100 mg) by mouth once daily at bedtime.      Solu-CORTEF Act-O-Vial, PF, 100 mg/2 mL injection if needed. FOR EMERGENCY         Allergies:  Bee venom protein (honey bee); Labetalol; Amoxicillin; Aspirin; Influenza virus vaccine, live attenuated; Influenza virus vaccines; Losartan-hydrochlorothiazide; Methylprednisolone; Metoclopramide; Midazolam; Pneumoc 13-becky conj-dip cr(pf); Pneumococcal 23-becky ps vaccine; Pneumococcal 23-valent polysaccharide vaccine; and Sulfa (sulfonamide antibiotics)    Social History:    Social History     Socioeconomic History    Marital status:      Spouse name: Not on file    Number of children: Not on file    Years of education: Not on file    Highest education level: Not on file   Occupational " History    Not on file   Tobacco Use    Smoking status: Former     Types: Cigarettes    Smokeless tobacco: Never   Vaping Use    Vaping status: Never Used   Substance and Sexual Activity    Alcohol use: Not Currently    Drug use: Never    Sexual activity: Yes     Partners: Male   Other Topics Concern    Not on file   Social History Narrative    Not on file     Social Determinants of Health     Financial Resource Strain: Low Risk  (8/26/2024)    Overall Financial Resource Strain (CARDIA)     Difficulty of Paying Living Expenses: Not hard at all   Food Insecurity: Not on file   Transportation Needs: No Transportation Needs (8/26/2024)    PRAPARE - Transportation     Lack of Transportation (Medical): No     Lack of Transportation (Non-Medical): No   Physical Activity: Not on file   Stress: Not on file   Social Connections: Not on file   Intimate Partner Violence: Not on file   Housing Stability: Low Risk  (8/26/2024)    Housing Stability Vital Sign     Unable to Pay for Housing in the Last Year: No     Number of Times Moved in the Last Year: 0     Homeless in the Last Year: No       Family History:   Family History   Problem Relation Name Age of Onset    Hypertension Mother      Atrial fibrillation Mother      Other (pacemaker) Mother      Heart failure Mother      Thyroid disease Mother      Thyroid disease Father      Hypertension Father      Diabetes Father      Lymphoma Father      Skin cancer Father      Colon cancer Sister      Hypertension Brother      Other (bladder cancer) Brother      Coronary artery disease Brother      Thyroid disease Daughter      Other (genital cancer) Paternal Grandmother         Review of Systems:   Denies any fever or chills no productive nonproductive cough no nausea emesis or diarrhea no dysuria no near syncope or syncope no chest pain no dyspnea orthopnea presumed nocturnal dyspnea no any other organ system related symptoms      Physical exam:   Constitutional:  VITALS:  /57   " Pulse 74   Temp 36.6 °C (97.9 °F)   Resp 18   Ht 1.721 m (5' 7.75\")   Wt 89.4 kg (197 lb)   SpO2 94%   BMI 30.18 kg/m²      Wt Readings from Last 3 Encounters:   08/24/24 89.4 kg (197 lb)   08/23/24 90.3 kg (199 lb)   07/02/24 91.2 kg (201 lb)      Gen: alert, awake, nad  Head: atraumatic, normocephalic  Skin: no rash, turgor wnl  Heent:  eomi, mmm  Neck: no bruits or jvd noted, thyroid normal  Lungs:  clear to auscultation  Heart:  regular rate and rhythm, no murmurs  Abdomen:  +bs, soft, nt, nd, no hepatosplenomegaly   Extremities: no edema  Psychiatric: mood and affect appropriate; judgement and insight intact  Musculoskeletal:  Rom, muscular strength intact; digits, nails normal    Data/  CBC:   Lab Results   Component Value Date    WBC 7.0 08/26/2024    RBC 3.52 (L) 08/26/2024    HGB 11.0 (L) 08/26/2024    HCT 32.3 (L) 08/26/2024    MCV 92 08/26/2024    MCH 31.3 08/26/2024    MCHC 34.1 08/26/2024    RDW 12.8 08/26/2024     08/26/2024     BMP:    Lab Results   Component Value Date     08/26/2024    K 4.1 08/26/2024     08/26/2024    CO2 25 08/26/2024    BUN 22 08/26/2024    CREATININE 1.21 (H) 08/26/2024    CALCIUM 8.9 08/26/2024    GLUCOSE 101 (H) 08/26/2024     CT abdomen pelvis w IV contrast  Narrative: Interpreted By:  Kellie Watkins,   STUDY:  CT ABDOMEN PELVIS W IV CONTRAST; 8/24/2024 7:00 am      INDICATION:  Signs/Symptoms:RLQ abdominal pain. Nausea. History of urinary bladder  surgery, left adrenal gland removal and hysterectomy      COMPARISON:  CT abdomen pelvis 03/19/24.      ACCESSION NUMBER(S):  [id number]      ORDERING CLINICIAN:  DANNIE FORBES      TECHNIQUE:  Oral contrast was not administered. 75 ml Omnipaque 350 was injected  intravenously. CT of the Abdomen and Pelvis with intravenous contrast  was performed. Axial, sagittal and coronal reformatted images were  reviewed.      FINDINGS:  Lower Chest: In the left lung base axial image 12 there is a  subpleural 5 mm x " 3 mm nodule stable since prior exams dating back to  at least 06/24/2020 CT chest. Minimal linear scarring in the lung  bases.      Abdomen: No intrahepatic biliary distention. Gallbladder  unremarkable. The liver, spleen, pancreas, and right adrenal gland  appear normal. Left adrenal gland has been removed      Kidneys show no hydronephrosis or renal calculi. Suspected vascular  calcification in the right renal hilum. In the upper pole of the  right kidney there is a low-density exophytic 5.9 cm structure likely  a large right renal cyst similar to the prior exam. In the lower pole  of the left kidney there is a 1 cm cortical hypodensity axial image  65 also likely a cyst statistically stable since the prior exam.      No bowel obstruction, free fluid or free air. No adenopathy.      Pelvis: Appendix appears normal. Surgical clip associated with the  anterior aspect of the urinary bladder. Urinary bladder otherwise  appears normal. Uterus has been removed. No free fluid in the pelvis  or adenopathy. No inflammatory changes.      Degenerative changes are noted throughout the lumbar spine with L3-4  lumbar canal stenosis. No compression fracture.      Impression: 1. No acute abdominal or pelvic abnormalities  2. Prior hysterectomy, bladder suspension and removal of the left  adrenal gland  3. Degenerative changes of the lumbar spine with lumbar canal  stenosis at L3-4  4. 5 mm left lower lobe lung nodule does not require further  follow-up due to its small size per Fleischner criteria and long-term  stability.  5. The lung nodule does not require follow-up due to its small size.  Nodules less than 6 mm do not require routine follow-up in a low risk  individual according to the Fleischner Society 2017 guidelines.  6. There are bilateral renal cortical hypodensities that are  statistically most likely cysts and are similar to the previous exam          Signed by: Kellie Watkins 8/24/2024 7:32 AM  Dictation workstation:    [id number]    LFT:   Lab Results   Component Value Date    AST 14 08/25/2024    ALT 7 08/25/2024    ALKPHOS 45 08/25/2024    BILITOT 0.7 08/25/2024      Urinalysis:   Lab Results   Component Value Date    YAMILET 5.5 08/24/2024    PROTUR NEGATIVE 08/24/2024    GLUCOSEU Normal 08/24/2024    BLOODU NEGATIVE 08/24/2024    KETONESU NEGATIVE 08/24/2024    BILIRUBINU NEGATIVE 08/24/2024    NITRITEU 2+ (A) 08/24/2024    LEUKOCYTESU 75 Noe/µL (A) 08/24/2024      Imaging: CT abdomen pelvis w IV contrast  Narrative: Interpreted By:  Kellie Watkins,   STUDY:  CT ABDOMEN PELVIS W IV CONTRAST; 8/24/2024 7:00 am      INDICATION:  Signs/Symptoms:RLQ abdominal pain. Nausea. History of urinary bladder  surgery, left adrenal gland removal and hysterectomy      COMPARISON:  CT abdomen pelvis 03/19/24.      ACCESSION NUMBER(S):  [id number]      ORDERING CLINICIAN:  DANNIE FORBES      TECHNIQUE:  Oral contrast was not administered. 75 ml Omnipaque 350 was injected  intravenously. CT of the Abdomen and Pelvis with intravenous contrast  was performed. Axial, sagittal and coronal reformatted images were  reviewed.      FINDINGS:  Lower Chest: In the left lung base axial image 12 there is a  subpleural 5 mm x 3 mm nodule stable since prior exams dating back to  at least 06/24/2020 CT chest. Minimal linear scarring in the lung  bases.      Abdomen: No intrahepatic biliary distention. Gallbladder  unremarkable. The liver, spleen, pancreas, and right adrenal gland  appear normal. Left adrenal gland has been removed      Kidneys show no hydronephrosis or renal calculi. Suspected vascular  calcification in the right renal hilum. In the upper pole of the  right kidney there is a low-density exophytic 5.9 cm structure likely  a large right renal cyst similar to the prior exam. In the lower pole  of the left kidney there is a 1 cm cortical hypodensity axial image  65 also likely a cyst statistically stable since the prior exam.      No bowel  obstruction, free fluid or free air. No adenopathy.      Pelvis: Appendix appears normal. Surgical clip associated with the  anterior aspect of the urinary bladder. Urinary bladder otherwise  appears normal. Uterus has been removed. No free fluid in the pelvis  or adenopathy. No inflammatory changes.      Degenerative changes are noted throughout the lumbar spine with L3-4  lumbar canal stenosis. No compression fracture.      Impression: 1. No acute abdominal or pelvic abnormalities  2. Prior hysterectomy, bladder suspension and removal of the left  adrenal gland  3. Degenerative changes of the lumbar spine with lumbar canal  stenosis at L3-4  4. 5 mm left lower lobe lung nodule does not require further  follow-up due to its small size per Fleischner criteria and long-term  stability.  5. The lung nodule does not require follow-up due to its small size.  Nodules less than 6 mm do not require routine follow-up in a low risk  individual according to the Fleischner Society 2017 guidelines.  6. There are bilateral renal cortical hypodensities that are  statistically most likely cysts and are similar to the previous exam          Signed by: Kellie Watkins 8/24/2024 7:32 AM  Dictation workstation:   [id number]           Assessment/  74 y.o. year old female who presented initially to her primary care for further evaluation and management of relatively acute onset and severe progressive course of pain described as right lower quadrant and radiates to the right flank not associated with dysuria fever or chills patient felt that this is could be a UTI he did receive outpatient antibiotic that failed and patient was advised to come to the hospital for possible UTI complicated with right pyelonephritis CT scan with IV contrast was done did not show any obstructive uropathy nephrolithiasis or evidence suggesting pyelonephritis she does have stable cysts with no change compared to prior study  Patient did have a recent MRI ordered  by me there was benign renal cysts that has not been changed compared to the colon CT scan  Patient does have chronic comorbidity of [place]'s disease and history of multiple UTI her baseline creatinine fluctuated between 0.9 and 1.2 she does consume enough fluids    Patient at her baseline GFR with no associated electrolyte or acid-base imbalance that can contribute to [place] hemodynamically stable afebrile nonoliguric none polyuric by moving the patient hip joint induces the pain she did receive pain medication but it was painful however urine sediment suggest UTI with more than 100,000 enteric bacilli the sensitivity of which has not been posted patient already on Cipro      Plan/  Repeat urine and culture continue IV fluid may consider right hip pain did discuss with the patient after thorough exam  Outpatient follow up from renal standpoint: Dr. Dick    Thank you for the consultation.  Please do not hesitate to call with questions.    Raven Dick MD

## 2024-08-27 ENCOUNTER — APPOINTMENT (OUTPATIENT)
Dept: RADIOLOGY | Facility: HOSPITAL | Age: 74
DRG: 690 | End: 2024-08-27
Payer: COMMERCIAL

## 2024-08-27 LAB
ALBUMIN SERPL BCP-MCNC: 3.5 G/DL (ref 3.4–5)
ANION GAP SERPL CALC-SCNC: 10 MMOL/L (ref 10–20)
BACTERIA UR CULT: ABNORMAL
BUN SERPL-MCNC: 19 MG/DL (ref 6–23)
CALCIUM SERPL-MCNC: 9.1 MG/DL (ref 8.6–10.3)
CHLORIDE SERPL-SCNC: 105 MMOL/L (ref 98–107)
CO2 SERPL-SCNC: 24 MMOL/L (ref 21–32)
CREAT SERPL-MCNC: 1.02 MG/DL (ref 0.5–1.05)
EGFRCR SERPLBLD CKD-EPI 2021: 57 ML/MIN/1.73M*2
GLUCOSE SERPL-MCNC: 133 MG/DL (ref 74–99)
HOLD SPECIMEN: NORMAL
PHOSPHATE SERPL-MCNC: 3.1 MG/DL (ref 2.5–4.9)
POTASSIUM SERPL-SCNC: 4.3 MMOL/L (ref 3.5–5.3)
SODIUM SERPL-SCNC: 135 MMOL/L (ref 136–145)

## 2024-08-27 PROCEDURE — 36415 COLL VENOUS BLD VENIPUNCTURE: CPT | Performed by: INTERNAL MEDICINE

## 2024-08-27 PROCEDURE — 2500000004 HC RX 250 GENERAL PHARMACY W/ HCPCS (ALT 636 FOR OP/ED): Performed by: FAMILY MEDICINE

## 2024-08-27 PROCEDURE — 2500000001 HC RX 250 WO HCPCS SELF ADMINISTERED DRUGS (ALT 637 FOR MEDICARE OP): Performed by: INTERNAL MEDICINE

## 2024-08-27 PROCEDURE — 72148 MRI LUMBAR SPINE W/O DYE: CPT

## 2024-08-27 PROCEDURE — 1100000001 HC PRIVATE ROOM DAILY

## 2024-08-27 PROCEDURE — 2500000001 HC RX 250 WO HCPCS SELF ADMINISTERED DRUGS (ALT 637 FOR MEDICARE OP): Performed by: FAMILY MEDICINE

## 2024-08-27 PROCEDURE — 2500000004 HC RX 250 GENERAL PHARMACY W/ HCPCS (ALT 636 FOR OP/ED): Performed by: INTERNAL MEDICINE

## 2024-08-27 PROCEDURE — 2500000002 HC RX 250 W HCPCS SELF ADMINISTERED DRUGS (ALT 637 FOR MEDICARE OP, ALT 636 FOR OP/ED): Performed by: FAMILY MEDICINE

## 2024-08-27 PROCEDURE — 2500000002 HC RX 250 W HCPCS SELF ADMINISTERED DRUGS (ALT 637 FOR MEDICARE OP, ALT 636 FOR OP/ED): Performed by: INTERNAL MEDICINE

## 2024-08-27 PROCEDURE — 72148 MRI LUMBAR SPINE W/O DYE: CPT | Performed by: RADIOLOGY

## 2024-08-27 PROCEDURE — 99233 SBSQ HOSP IP/OBS HIGH 50: CPT | Performed by: INTERNAL MEDICINE

## 2024-08-27 PROCEDURE — 80069 RENAL FUNCTION PANEL: CPT | Performed by: INTERNAL MEDICINE

## 2024-08-27 RX ORDER — ACETAMINOPHEN 160 MG/5ML
650 SOLUTION ORAL EVERY 4 HOURS PRN
Status: DISCONTINUED | OUTPATIENT
Start: 2024-08-27 | End: 2024-08-28 | Stop reason: HOSPADM

## 2024-08-27 RX ORDER — CEFTRIAXONE 1 G/50ML
1 INJECTION, SOLUTION INTRAVENOUS EVERY 24 HOURS
Status: DISCONTINUED | OUTPATIENT
Start: 2024-08-27 | End: 2024-08-28 | Stop reason: HOSPADM

## 2024-08-27 RX ORDER — ACETAMINOPHEN 650 MG/1
650 SUPPOSITORY RECTAL EVERY 4 HOURS PRN
Status: DISCONTINUED | OUTPATIENT
Start: 2024-08-27 | End: 2024-08-28 | Stop reason: HOSPADM

## 2024-08-27 RX ORDER — ACETAMINOPHEN 325 MG/1
650 TABLET ORAL EVERY 4 HOURS PRN
Status: DISCONTINUED | OUTPATIENT
Start: 2024-08-27 | End: 2024-08-28 | Stop reason: HOSPADM

## 2024-08-27 RX ORDER — LORAZEPAM 0.5 MG/1
0.5 TABLET ORAL ONCE
Status: COMPLETED | OUTPATIENT
Start: 2024-08-27 | End: 2024-08-27

## 2024-08-27 RX ADMIN — ACETAMINOPHEN 487.5 MG: 325 TABLET ORAL at 22:58

## 2024-08-27 RX ADMIN — ENOXAPARIN SODIUM 40 MG: 40 INJECTION SUBCUTANEOUS at 17:27

## 2024-08-27 RX ADMIN — HYDROCHLOROTHIAZIDE 25 MG: 25 TABLET ORAL at 08:44

## 2024-08-27 RX ADMIN — METOPROLOL SUCCINATE 50 MG: 50 TABLET, EXTENDED RELEASE ORAL at 08:44

## 2024-08-27 RX ADMIN — HYDROCORTISONE 15 MG: 5 TABLET ORAL at 08:43

## 2024-08-27 RX ADMIN — MORPHINE SULFATE 4 MG: 4 INJECTION, SOLUTION INTRAMUSCULAR; INTRAVENOUS at 23:52

## 2024-08-27 RX ADMIN — CIPROFLOXACIN 400 MG: 400 INJECTION, SOLUTION INTRAVENOUS at 08:43

## 2024-08-27 RX ADMIN — ATORVASTATIN CALCIUM 20 MG: 20 TABLET, FILM COATED ORAL at 21:31

## 2024-08-27 RX ADMIN — LORAZEPAM 0.5 MG: 0.5 TABLET ORAL at 18:46

## 2024-08-27 RX ADMIN — MORPHINE SULFATE 4 MG: 4 INJECTION, SOLUTION INTRAMUSCULAR; INTRAVENOUS at 08:44

## 2024-08-27 RX ADMIN — HYDROCORTISONE SODIUM SUCCINATE 50 MG: 100 INJECTION, POWDER, FOR SOLUTION INTRAMUSCULAR; INTRAVENOUS at 17:24

## 2024-08-27 RX ADMIN — HYDROCORTISONE 5 MG: 5 TABLET ORAL at 21:31

## 2024-08-27 RX ADMIN — CEFTRIAXONE SODIUM 1 G: 1 INJECTION, SOLUTION INTRAVENOUS at 17:25

## 2024-08-27 RX ADMIN — DIPHENHYDRAMINE HYDROCHLORIDE 25 MG: 25 TABLET ORAL at 22:58

## 2024-08-27 RX ADMIN — GABAPENTIN 100 MG: 100 CAPSULE ORAL at 21:31

## 2024-08-27 RX ADMIN — HYDROCORTISONE SODIUM SUCCINATE 50 MG: 100 INJECTION, POWDER, FOR SOLUTION INTRAMUSCULAR; INTRAVENOUS at 08:44

## 2024-08-27 RX ADMIN — HYDROCORTISONE SODIUM SUCCINATE 50 MG: 100 INJECTION, POWDER, FOR SOLUTION INTRAMUSCULAR; INTRAVENOUS at 00:50

## 2024-08-27 RX ADMIN — SODIUM CHLORIDE 75 ML/HR: 9 INJECTION, SOLUTION INTRAVENOUS at 01:09

## 2024-08-27 RX ADMIN — HYDROCORTISONE SODIUM SUCCINATE 50 MG: 100 INJECTION, POWDER, FOR SOLUTION INTRAMUSCULAR; INTRAVENOUS at 23:52

## 2024-08-27 RX ADMIN — CLOPIDOGREL BISULFATE 75 MG: 75 TABLET, FILM COATED ORAL at 08:44

## 2024-08-27 RX ADMIN — HYDROCORTISONE 5 MG: 5 TABLET ORAL at 17:25

## 2024-08-27 RX ADMIN — TRAMADOL HYDROCHLORIDE 50 MG: 50 TABLET ORAL at 21:35

## 2024-08-27 ASSESSMENT — COGNITIVE AND FUNCTIONAL STATUS - GENERAL
CLIMB 3 TO 5 STEPS WITH RAILING: A LITTLE
DAILY ACTIVITIY SCORE: 24
MOBILITY SCORE: 23
CLIMB 3 TO 5 STEPS WITH RAILING: A LITTLE
MOBILITY SCORE: 22
WALKING IN HOSPITAL ROOM: A LITTLE
DAILY ACTIVITIY SCORE: 24
MOBILITY SCORE: 22
CLIMB 3 TO 5 STEPS WITH RAILING: A LITTLE
WALKING IN HOSPITAL ROOM: A LITTLE

## 2024-08-27 ASSESSMENT — PAIN DESCRIPTION - ORIENTATION
ORIENTATION: LOWER
ORIENTATION: LOWER

## 2024-08-27 ASSESSMENT — PAIN DESCRIPTION - LOCATION
LOCATION: ABDOMEN
LOCATION: OTHER (COMMENT)
LOCATION: BACK

## 2024-08-27 ASSESSMENT — PAIN SCALES - GENERAL
PAINLEVEL_OUTOF10: 4
PAINLEVEL_OUTOF10: 4
PAINLEVEL_OUTOF10: 5 - MODERATE PAIN
PAINLEVEL_OUTOF10: 7

## 2024-08-27 ASSESSMENT — PAIN - FUNCTIONAL ASSESSMENT
PAIN_FUNCTIONAL_ASSESSMENT: 0-10
PAIN_FUNCTIONAL_ASSESSMENT: 0-10

## 2024-08-27 NOTE — CARE PLAN
The patient's goals for the shift include  no pain    The clinical goals for the shift include pain control      Problem: Pain - Adult  Goal: Verbalizes/displays adequate comfort level or baseline comfort level  Outcome: Progressing     Problem: Safety - Adult  Goal: Free from fall injury  Outcome: Progressing     Problem: Pain  Goal: Takes deep breaths with improved pain control throughout the shift  Outcome: Progressing  Goal: Turns in bed with improved pain control throughout the shift  Outcome: Progressing  Goal: Walks with improved pain control throughout the shift  Outcome: Progressing  Goal: Performs ADL's with improved pain control throughout shift  Outcome: Progressing  Goal: Participates in PT with improved pain control throughout the shift  Outcome: Progressing  Goal: Free from opioid side effects throughout the shift  Outcome: Progressing  Goal: Free from acute confusion related to pain meds throughout the shift  Outcome: Progressing

## 2024-08-27 NOTE — PROGRESS NOTES
Renal Progress Note  Assessment/  74 y.o. year old female who presented initially to her primary care for further evaluation and management of relatively acute onset and severe progressive course of pain described as right lower quadrant and radiates to the right flank not associated with dysuria fever or chills patient felt that this is could be a UTI he did receive outpatient antibiotic that failed and patient was advised to come to the hospital for possible UTI complicated with right pyelonephritis CT scan with IV contrast was done did not show any obstructive uropathy nephrolithiasis or evidence suggesting pyelonephritis she does have stable cysts with no change compared to prior study  Patient did have a recent MRI ordered by me there was benign renal cysts that has not been changed compared to the colon CT scan  Patient does have chronic comorbidity of [place]'s disease and history of multiple UTI her baseline creatinine fluctuated between 0.9 and 1.2 she does consume enough fluids     Patient at her baseline GFR with no associated electrolyte or acid-base imbalance that can contribute to [place] hemodynamically stable afebrile nonoliguric none polyuric by moving the patient hip joint induces the pain she did receive pain medication but it was painful however urine sediment suggest UTI with more than 100,000 enteric bacilli the sensitivity of which has not been posted patient already on Cipro        Plan/  Repeat urine and culture came back negative   Hep-Lock IV fluid   Will follow next 24 hours if stable will sign off   outpatient follow up from renal standpoint: Dr. Dick    Subjective:   Admit Date: 8/24/2024    Interval History: Patient seen and examined uneventful night better pain did not sleep good yesterday last night no dysuria and no any other symptoms patient is scheduled for MRI      Medications:   Scheduled Meds:atorvastatin, 20 mg, oral, Nightly  ciprofloxacin, 400 mg, intravenous,  "q12h  clopidogrel, 75 mg, oral, Daily  enoxaparin, 40 mg, subcutaneous, q24h  gabapentin, 100 mg, oral, Nightly  hydroCHLOROthiazide, 25 mg, oral, Daily  hydrocortisone, 15 mg, oral, q AM  hydrocortisone, 5 mg, oral, Daily  hydrocortisone, 5 mg, oral, Nightly  hydrocortisone sodium succinate, 50 mg, intravenous, q8h  LORazepam, 0.5 mg, oral, Once  metoprolol succinate XL, 50 mg, oral, Daily  polyethylene glycol, 17 g, oral, Daily      Continuous Infusions:sodium chloride 0.9%, 75 mL/hr, Last Rate: 75 mL/hr (08/27/24 0109)        CBC:   Lab Results   Component Value Date    HGB 11.0 (L) 08/26/2024    HGB 11.3 (L) 08/25/2024    WBC 7.0 08/26/2024    WBC 7.9 08/25/2024     08/26/2024     08/25/2024      Anemia:  No results found for: \"FERRITIN\", \"IRON\", \"TIBC\"   BMP:    Lab Results   Component Value Date     (L) 08/27/2024     (L) 08/26/2024    K 4.3 08/27/2024    K 4.4 08/26/2024     08/27/2024     08/26/2024    CO2 24 08/27/2024    CO2 24 08/26/2024    BUN 19 08/27/2024    BUN 19 08/26/2024    CREATININE 1.02 08/27/2024    CREATININE 1.09 (H) 08/26/2024      Bone disease:   Lab Results   Component Value Date    PHOS 3.1 08/27/2024      Urinalysis:    Lab Results   Component Value Date    YAMILET 5.5 08/26/2024    PROTUR NEGATIVE 08/26/2024    GLUCOSEU Normal 08/26/2024    BLOODU NEGATIVE 08/26/2024    KETONESU NEGATIVE 08/26/2024    BILIRUBINU NEGATIVE 08/26/2024    NITRITEU NEGATIVE 08/26/2024    LEUKOCYTESU NEGATIVE 08/26/2024        Objective:   Vitals: /59   Pulse 76   Temp 36.7 °C (98.1 °F)   Resp 18   Ht 1.721 m (5' 7.75\")   Wt 89.4 kg (197 lb)   SpO2 95%   BMI 30.18 kg/m²    Wt Readings from Last 3 Encounters:   08/24/24 89.4 kg (197 lb)   08/23/24 90.3 kg (199 lb)   07/02/24 91.2 kg (201 lb)      24HR INTAKE/OUTPUT:    Intake/Output Summary (Last 24 hours) at 8/27/2024 1236  Last data filed at 8/26/2024 2225  Gross per 24 hour   Intake 200 ml   Output --   Net " 200 ml     Admission weight:  Weight: 89.4 kg (197 lb)      Constitutional:  Alert, awake, no apparent distress   Skin:normal, no rash  HEENT:sclera anicteric.  Head atraumatic normocephalic  Neck:supple with no thyromegally  Cardiovascular:  S1, S2 without m/r/g   Respiratory:  CTA B without w/r/r   Abdomen: +bs, soft, nt  Ext: no LE edema  Musculoskeletal:Intact  Neuro:Alert and oriented with no deficit      Electronically signed by Raven Dick MD on 8/27/2024 at 12:36 PM

## 2024-08-27 NOTE — CONSULTS
Inpatient consult to Endocrinology  Consult performed by: Bel Zimmer MD  Consult ordered by: Vinnie Gonzales MD          Assessment/Plan   Adrenal Insufficiency  On Hydrocortisone 15 mg +5 mg+ 5 mg  On Cipro    Low BP today - increase Hydrocortisone 50 mg iv q 8 hours  Low normal sodium    Reason For Consult  Adrenal insufficiency    History Of Present Illness  Daysi Resendiz is a 74 y.o. female with  has a past medical history of [place]'s disease (Multi), Anemia, Arthritis, Asymptomatic menopausal state, Carotid artery stenosis, Cerebral infarction due to unspecified occlusion or stenosis of unspecified cerebral artery (Multi) (12/20/2021), Cerebral vascular accident (Multi), CKD (chronic kidney disease), Claustrophobia, COVID-19, Decreased coordination, Frequency-urgency syndrome, Gait disturbance, Gout, H/O adrenal disorder, Hyperlipidemia, Hypertension, Other conditions influencing health status, Other tear of lateral meniscus, current injury, unspecified knee, initial encounter (02/21/2020), Personal history of other specified conditions (12/29/2021), Personal history of other specified conditions (01/10/2022), Personal history of other specified conditions (12/29/2021), Personal history of transient ischemic attack (TIA), and cerebral infarction without residual deficits (12/20/2021), Shortness of breath, Unilateral primary osteoarthritis, left knee (03/25/2020), Unspecified kidney failure, Urinary tract infection, Urine retention, Venous stasis, Vertigo, and Wears dentures. presenting with right abdominal pain and adrenal insufficiency-  Per HPI-  .  History Of Present Illness  Daysi Resendiz is a 74 y.o. female presenting with uti     .74-year-old female presents emergency department with chief complaint of abdominal pain. Patient states she has been having these symptoms since Monday. She reports she did follow with a primary care doctor who found that she had a urinary tract infection. Patient  states she has been on Cipro and has taken 2 doses, but is not having relief. She describes her pain as aching to sharp stabbing pain in her right lower quadrant and low back area. Denies any fevers, but reports chills. No significant coughing or congestion. Patient denies chest pain or difficulty breathing. She admits to nausea but no vomiting. Denies dysuria, hematuria, or constipation. Patient does report diarrhea. Patient is on Plavix. Chart review shows significant past medical history of [place]'s disease, hypertension, adrenal gland neoplasm, vertigo, cognitive impairment, gait disturbance, hyperlipidemia, chronic kidney disease, who presented. Overall her echo frequent urinary tract infections, renal cyst, and previous tobacco use. No reported illicit drug use or regular alcohol use.      Patient has history of [place]ham's.  Thus has difficulty with infections.  Was being treated for urinary tract infection as an outpatient.  Was not getting better.  Was suggested by primary care physician to come to hospital for admission.  Patient had declined initially.  Then she got worse.  Came to the emergency department.  Was seen at the bedside.  Still complains of feeling badly.  Right flank pain.  Right CVA tenderness.  She follows with urology.  [facility].  CT scan revealed a right renal cyst.  Stable.  In the emergency department patient received IV fluids and fentanyl.  No findings of sepsis.  Chronic kidney disease.  Patient was admitted.  Will be placed on IV antibiotics.  IV analgesics.  Antiemetics.  Follow urine culture.  Hold off on urology consultation for now while she is an inpatient pending clinical course.  She is otherwise alert she is not in any distress no JVD lungs clear heart regular abdomen otherwise soft trace edema    Past Medical History  She has a past medical history of Raghu's disease (Multi), Anemia, Arthritis, Asymptomatic menopausal state, Carotid artery stenosis,  Cerebral infarction due to unspecified occlusion or stenosis of unspecified cerebral artery (Multi) (12/20/2021), Cerebral vascular accident (Multi), CKD (chronic kidney disease), Claustrophobia, COVID-19, Decreased coordination, Frequency-urgency syndrome, Gait disturbance, Gout, H/O adrenal disorder, Hyperlipidemia, Hypertension, Other conditions influencing health status, Other tear of lateral meniscus, current injury, unspecified knee, initial encounter (02/21/2020), Personal history of other specified conditions (12/29/2021), Personal history of other specified conditions (01/10/2022), Personal history of other specified conditions (12/29/2021), Personal history of transient ischemic attack (TIA), and cerebral infarction without residual deficits (12/20/2021), Shortness of breath, Unilateral primary osteoarthritis, left knee (03/25/2020), Unspecified kidney failure, Urinary tract infection, Urine retention, Venous stasis, Vertigo, and Wears dentures.    Surgical History  She has a past surgical history that includes Bladder surgery (10/29/2013); Hysterectomy (01/28/2020); Other surgical history (11/24/2021); Other surgical history (Left, 11/24/2021); Other surgical history (11/24/2021); Other surgical history (Left, 03/02/2020); MR angio head wo IV contrast (06/13/2019); MR angio neck wo IV contrast (06/13/2019); CT angio neck (06/18/2019); Adrenal gland surgery; Colonoscopy; and Colonoscopy.     Social History  She reports that she has quit smoking. Her smoking use included cigarettes. She has never used smokeless tobacco. She reports that she does not currently use alcohol. She reports that she does not use drugs.    Family History  Family History   Problem Relation Name Age of Onset    Hypertension Mother      Atrial fibrillation Mother      Other (pacemaker) Mother      Heart failure Mother      Thyroid disease Mother      Thyroid disease Father      Hypertension Father      Diabetes Father      Lymphoma  Father      Skin cancer Father      Colon cancer Sister      Hypertension Brother      Other (bladder cancer) Brother      Coronary artery disease Brother      Thyroid disease Daughter      Other (genital cancer) Paternal Grandmother          Allergies  Bee venom protein (honey bee); Labetalol; Amoxicillin; Aspirin; Influenza virus vaccine, live attenuated; Influenza virus vaccines; Losartan-hydrochlorothiazide; Methylprednisolone; Metoclopramide; Midazolam; Pneumoc 13-becky conj-dip cr(pf); Pneumococcal 23-becky ps vaccine; Pneumococcal 23-valent polysaccharide vaccine; and Sulfa (sulfonamide antibiotics)    Review of Systems  Per HPI  Past Medical History:   Diagnosis Date    Raghu's disease (Multi)     Anemia     Arthritis     Asymptomatic menopausal state     Post-menopausal    Carotid artery stenosis     Cerebral infarction due to unspecified occlusion or stenosis of unspecified cerebral artery (Multi) 12/20/2021    Right pontine stroke    Cerebral vascular accident (Multi)     MILD LEFT SIDE WEAKNESS    CKD (chronic kidney disease)     STAGE 3A    Claustrophobia     COVID-19     VACCINATED    Decreased coordination     Frequency-urgency syndrome     Gait disturbance     Gout     H/O adrenal disorder     Hyperlipidemia     Hypertension     Other conditions influencing health status     Stroke syndrome    Other tear of lateral meniscus, current injury, unspecified knee, initial encounter 02/21/2020    Lateral meniscal tear    Personal history of other specified conditions 12/29/2021    History of headache    Personal history of other specified conditions 01/10/2022    History of fever    Personal history of other specified conditions 12/29/2021    History of fatigue    Personal history of transient ischemic attack (TIA), and cerebral infarction without residual deficits 12/20/2021    History of transient ischemic attack    Shortness of breath     Unilateral primary osteoarthritis, left knee 03/25/2020    Left knee  DJD    Unspecified kidney failure     Renal failure    Urinary tract infection     Urine retention     Venous stasis     BILATERAL    Vertigo     Wears dentures        Past Surgical History:   Procedure Laterality Date    ADRENAL GLAND SURGERY      LEFT ADRENAL GLAND REMOVED    BLADDER SURGERY  10/29/2013    Bladder Surgery    COLONOSCOPY      COLONOSCOPY      CT ANGIO NECK  06/18/2019    CT NECK ANGIO W AND WO IV CONTRAST 6/18/2019 [facility]    HYSTERECTOMY  01/28/2020    Hysterectomy    MR HEAD ANGIO WO IV CONTRAST  06/13/2019    MR HEAD ANGIO WO IV CONTRAST 6/13/2019 [facility]    MR NECK ANGIO WO IV CONTRAST  06/13/2019    MR NECK ANGIO WO IV CONTRAST 6/13/2019 [facility]    OTHER SURGICAL HISTORY  11/24/2021    Varicose vein ligation    OTHER SURGICAL HISTORY Left 11/24/2021    Knee replacement    OTHER SURGICAL HISTORY  11/24/2021    Colonoscopy    OTHER SURGICAL HISTORY Left 03/02/2020    Knee arthroscopy       Social History     Socioeconomic History    Marital status:      Spouse name: Not on file    Number of children: Not on file    Years of education: Not on file    Highest education level: Not on file   Occupational History    Not on file   Tobacco Use    Smoking status: Former     Types: Cigarettes    Smokeless tobacco: Never   Vaping Use    Vaping status: Never Used   Substance and Sexual Activity    Alcohol use: Not Currently    Drug use: Never    Sexual activity: Yes     Partners: Male   Other Topics Concern    Not on file   Social History Narrative    Not on file     Social Determinants of Health     Financial Resource Strain: Low Risk  (8/26/2024)    Overall Financial Resource Strain (CARDIA)     Difficulty of Paying Living Expenses: Not hard at all   Food Insecurity: Not on file   Transportation Needs: No Transportation Needs (8/26/2024)    PRAPARE - Transportation     Lack of Transportation (Medical): No     Lack of Transportation (Non-Medical): No  "  Physical Activity: Not on file   Stress: Not on file   Social Connections: Not on file   Intimate Partner Violence: Not on file   Housing Stability: Low Risk  (8/26/2024)    Housing Stability Vital Sign     Unable to Pay for Housing in the Last Year: No     Number of Times Moved in the Last Year: 0     Homeless in the Last Year: No        Physical Exam   deferred  ROS, PMH, FH/SH, surgical history and allergies have been reviewed.    Last Recorded Vitals  Blood pressure 133/59, pulse 76, temperature 36.7 °C (98.1 °F), resp. rate 18, height 1.721 m (5' 7.75\"), weight 89.4 kg (197 lb), SpO2 95%.    Relevant Results  Results from last 7 days   Lab Units 08/26/24  1443 08/26/24  0530 08/25/24  0625 08/24/24  0552   GLUCOSE mg/dL 143* 101* 94 102*     Lab Results   Component Value Date    HGBA1C 5.5 06/08/2021      Vitals:    08/26/24 0544 08/26/24 0836 08/26/24 1235 08/26/24 1950   BP: 114/57 121/58 121/57 133/59   BP Location: Right arm      Patient Position: Lying      Pulse: 62 71 74 76   Resp: 18      Temp: 36.2 °C (97.2 °F)  36.6 °C (97.9 °F) 36.7 °C (98.1 °F)   TempSrc: Temporal      SpO2: 95%  94% 95%   Weight:       Height:           Latest Reference Range & Units 04/11/19 12:21 05/03/19 12:21 06/13/19 16:10 08/07/19 12:12 08/13/19 09:42 10/22/19 08:35 01/16/20 09:41 02/18/20 09:33 03/13/20 05:32 06/08/20 09:47 06/24/20 18:06 08/20/20 09:15 11/10/20 10:35 11/29/20 18:48 12/07/20 09:18 03/01/21 09:01 04/01/21 09:16 06/08/21 14:46 07/06/21 09:09 10/12/21 09:37 11/16/21 14:56 12/13/21 09:18 03/30/22 14:18 05/10/22 10:26 06/08/22 14:46 08/01/22 09:07 10/25/22 09:19 01/09/23 14:56 01/31/23 09:20 03/29/23 14:58 04/24/23 09:01 07/19/23 14:24 08/07/23 08:50 09/18/23 14:48 10/23/23 09:58 02/08/24 09:18 03/28/24 15:10 07/02/24 12:31 08/24/24 05:52 08/25/24 06:25 08/26/24 05:30 08/26/24 14:43   SODIUM 136 - 145 mmol/L 135 (L) 138 138 140 138 139 141 139 138 140 139 139 139 138 137 142 140 140 139 140 138 139 139 141 137 " 139 138 139 140 138 139 138 138 139 140 140 137 141 136 135 (L) 137 134 (L)   (L): Data is abnormally low    Baldemar Zimmer MD FACE  Office phone - [phone]  Fax - [phone]  Address: [address] [facility] [id number]  Address: [id number] [place] [id number]  8/27/2024  12:03 AM

## 2024-08-27 NOTE — CARE PLAN
The patient's goals for the shift include  no pain    The clinical goals for the shift include pain control      Problem: Pain - Adult  Goal: Verbalizes/displays adequate comfort level or baseline comfort level  Outcome: Progressing     Problem: Pain  Goal: Takes deep breaths with improved pain control throughout the shift  Outcome: Progressing  Goal: Turns in bed with improved pain control throughout the shift  Outcome: Progressing  Goal: Walks with improved pain control throughout the shift  Outcome: Progressing  Goal: Performs ADL's with improved pain control throughout shift  Outcome: Progressing  Goal: Participates in PT with improved pain control throughout the shift  Outcome: Progressing  Goal: Free from opioid side effects throughout the shift  Outcome: Progressing  Goal: Free from acute confusion related to pain meds throughout the shift  Outcome: Progressing

## 2024-08-27 NOTE — CARE PLAN
The patient's goals for the shift include      The clinical goals for the shift include Safety    Problem: Pain - Adult  Goal: Verbalizes/displays adequate comfort level or baseline comfort level  Outcome: Progressing     Problem: Safety - Adult  Goal: Free from fall injury  Outcome: Progressing     Problem: Discharge Planning  Goal: Discharge to home or other facility with appropriate resources  Outcome: Progressing     Problem: Chronic Conditions and Co-morbidities  Goal: Patient's chronic conditions and co-morbidity symptoms are monitored and maintained or improved  Outcome: Progressing     Problem: Pain  Goal: Takes deep breaths with improved pain control throughout the shift  Outcome: Progressing  Goal: Turns in bed with improved pain control throughout the shift  Outcome: Progressing  Goal: Walks with improved pain control throughout the shift  Outcome: Progressing  Goal: Performs ADL's with improved pain control throughout shift  Outcome: Progressing  Goal: Participates in PT with improved pain control throughout the shift  Outcome: Progressing  Goal: Free from opioid side effects throughout the shift  Outcome: Progressing  Goal: Free from acute confusion related to pain meds throughout the shift  Outcome: Progressing

## 2024-08-28 ENCOUNTER — APPOINTMENT (OUTPATIENT)
Dept: PRIMARY CARE | Facility: CLINIC | Age: 74
End: 2024-08-28
Payer: COMMERCIAL

## 2024-08-28 VITALS
DIASTOLIC BLOOD PRESSURE: 66 MMHG | BODY MASS INDEX: 29.86 KG/M2 | HEIGHT: 68 IN | OXYGEN SATURATION: 94 % | RESPIRATION RATE: 18 BRPM | TEMPERATURE: 97.5 F | SYSTOLIC BLOOD PRESSURE: 139 MMHG | HEART RATE: 73 BPM | WEIGHT: 197 LBS

## 2024-08-28 LAB
ALBUMIN SERPL BCP-MCNC: 3.4 G/DL (ref 3.4–5)
ANION GAP SERPL CALC-SCNC: 11 MMOL/L (ref 10–20)
BUN SERPL-MCNC: 19 MG/DL (ref 6–23)
CALCIUM SERPL-MCNC: 9.1 MG/DL (ref 8.6–10.3)
CHLORIDE SERPL-SCNC: 103 MMOL/L (ref 98–107)
CO2 SERPL-SCNC: 26 MMOL/L (ref 21–32)
CREAT SERPL-MCNC: 0.91 MG/DL (ref 0.5–1.05)
EGFRCR SERPLBLD CKD-EPI 2021: 66 ML/MIN/1.73M*2
GLUCOSE SERPL-MCNC: 143 MG/DL (ref 74–99)
HOLD SPECIMEN: NORMAL
HOLD SPECIMEN: NORMAL
PHOSPHATE SERPL-MCNC: 3.2 MG/DL (ref 2.5–4.9)
POTASSIUM SERPL-SCNC: 3.8 MMOL/L (ref 3.5–5.3)
SODIUM SERPL-SCNC: 136 MMOL/L (ref 136–145)

## 2024-08-28 PROCEDURE — 80069 RENAL FUNCTION PANEL: CPT | Performed by: INTERNAL MEDICINE

## 2024-08-28 PROCEDURE — 2500000001 HC RX 250 WO HCPCS SELF ADMINISTERED DRUGS (ALT 637 FOR MEDICARE OP): Performed by: INTERNAL MEDICINE

## 2024-08-28 PROCEDURE — 2500000001 HC RX 250 WO HCPCS SELF ADMINISTERED DRUGS (ALT 637 FOR MEDICARE OP): Performed by: FAMILY MEDICINE

## 2024-08-28 PROCEDURE — 99238 HOSP IP/OBS DSCHRG MGMT 30/<: CPT | Performed by: INTERNAL MEDICINE

## 2024-08-28 PROCEDURE — 2500000004 HC RX 250 GENERAL PHARMACY W/ HCPCS (ALT 636 FOR OP/ED): Performed by: FAMILY MEDICINE

## 2024-08-28 PROCEDURE — 36415 COLL VENOUS BLD VENIPUNCTURE: CPT | Performed by: INTERNAL MEDICINE

## 2024-08-28 PROCEDURE — 2500000002 HC RX 250 W HCPCS SELF ADMINISTERED DRUGS (ALT 637 FOR MEDICARE OP, ALT 636 FOR OP/ED): Performed by: FAMILY MEDICINE

## 2024-08-28 PROCEDURE — 2500000004 HC RX 250 GENERAL PHARMACY W/ HCPCS (ALT 636 FOR OP/ED): Performed by: INTERNAL MEDICINE

## 2024-08-28 RX ORDER — HYDROCHLOROTHIAZIDE 25 MG/1
25 TABLET ORAL DAILY
Start: 2024-08-28 | End: 2025-08-28

## 2024-08-28 RX ORDER — CEPHALEXIN 500 MG/1
500 CAPSULE ORAL 3 TIMES DAILY
Qty: 30 CAPSULE | Refills: 0 | Status: SHIPPED | OUTPATIENT
Start: 2024-08-28 | End: 2024-09-07

## 2024-08-28 RX ADMIN — HYDROCORTISONE SODIUM SUCCINATE 50 MG: 100 INJECTION, POWDER, FOR SOLUTION INTRAMUSCULAR; INTRAVENOUS at 09:00

## 2024-08-28 RX ADMIN — HYDROCORTISONE 15 MG: 5 TABLET ORAL at 09:09

## 2024-08-28 RX ADMIN — CLOPIDOGREL BISULFATE 75 MG: 75 TABLET, FILM COATED ORAL at 09:01

## 2024-08-28 RX ADMIN — ACETAMINOPHEN 650 MG: 325 TABLET ORAL at 10:02

## 2024-08-28 RX ADMIN — METOPROLOL SUCCINATE 50 MG: 50 TABLET, EXTENDED RELEASE ORAL at 09:01

## 2024-08-28 RX ADMIN — HYDROCHLOROTHIAZIDE 25 MG: 25 TABLET ORAL at 09:00

## 2024-08-28 RX ADMIN — POLYETHYLENE GLYCOL 3350 17 G: 17 POWDER, FOR SOLUTION ORAL at 09:01

## 2024-08-28 ASSESSMENT — COGNITIVE AND FUNCTIONAL STATUS - GENERAL
CLIMB 3 TO 5 STEPS WITH RAILING: A LITTLE
MOBILITY SCORE: 23
DAILY ACTIVITIY SCORE: 24

## 2024-08-28 ASSESSMENT — PAIN - FUNCTIONAL ASSESSMENT
PAIN_FUNCTIONAL_ASSESSMENT: 0-10
PAIN_FUNCTIONAL_ASSESSMENT: 0-10

## 2024-08-28 ASSESSMENT — PAIN SCALES - GENERAL
PAINLEVEL_OUTOF10: 0 - NO PAIN
PAINLEVEL_OUTOF10: 0 - NO PAIN
PAINLEVEL_OUTOF10: 3
PAINLEVEL_OUTOF10: 4

## 2024-08-28 ASSESSMENT — PAIN DESCRIPTION - LOCATION: LOCATION: PELVIS

## 2024-08-28 NOTE — DISCHARGE SUMMARY
Discharge Diagnosis:   Right lower quadrant abdominal pain     Discharge Date: 08/28/24   Admission Date: 8/24/2024     Discharge Physical Exam:    HEENT:  Atraumatic, PERRL. Conjunctivae clear.  Moist nasal mucous membranes.   Neck:  Supple without thyromegaly or lymphadenopathy.  Lungs:  Clear to auscultation without rales, rhonchi, or rub.  Heart:  RRR, S1, S2, without M.  Abdomen:  Soft, non tender, no organ enlargement.  Bowel sounds present . No CVA tenderness.  Extremities:  No edema. No calf swelling or tenderness.    Skin:  No rash, ecchymosis or erythema.    Hospital Course:   Daysi Resendiz is a pleasant 74-year-old female went to the emergency department with ongoing right flank pain and also outpatient failure of UTI.  She did a CAT scan of abdomen and pelvis which did not show any Fridays.  She was started on IV ciprofloxacin.  Her urine was sent for culture.  Indeed patient had urine infection with E. coli which was resistant to ciprofloxacin.  She was started on ceftriaxone.  Patient was found to have E. coli UTI.  She was evaluated by nephrology regarding the kidney function.  It was thought that the right pain was not related to kidney rather she went for a MRI of the lumbar spine.  It did show severe spinal stenosis at lumbar 2 lumbar 3.  Patient would need outpatient workup for spinal stenosis.  She was sent home on Keflex.  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  She is on chronic suppression with hydrocortisone.  She was seen by the endocrinologist while she is in the hospital.  She will follow-up with her outpatient endocrinologist regarding this.    On the day of discharge patient was ambulating well, eating and drinking well. Physical exam was essentially benign and was at baseline. Blood chemistry and other lab testing results were at acceptable for discharge. Patient remained hemodynamically stable and with no further acute concern. Patient verbalized understanding of discharge  "medications and the expected adverse effects, if any.       The detail of the hospital course  including admission H&P, consult recommendations, biochemical lab results, imaging studies can be found in EHR of [facility]. Total 29 minutes time was spent on discharge exam, medicine reconciliation, discharge instructions and preparing discharge summary.       Home Going Medications:      Medication List      START taking these medications     cephalexin 500 mg capsule; Commonly known as: Keflex; Take 1 capsule   (500 mg) by mouth 3 times a day for 10 days.     CHANGE how you take these medications     hydroCHLOROthiazide 25 mg tablet; Commonly known as: HYDRODiuril; Take 1   tablet (25 mg) by mouth once daily.; What changed: medication strength,   how much to take, when to take this     CONTINUE taking these medications     acetaminophen 650 mg ER tablet; Commonly known as: Tylenol 8 HOUR   atorvastatin 20 mg tablet; Commonly known as: Lipitor; Take 1 tablet (20   mg) by mouth once daily at bedtime.   BD SafetyGlide Needle 25 gauge x 1\" needle; Generic drug: safety needles   clopidogrel 75 mg tablet; Commonly known as: Plavix; Take 1 tablet (75   mg) by mouth once daily.   diphenhydrAMINE-acetaminophen  mg per tablet; Commonly known as:   Tylenol PM   gabapentin 100 mg capsule; Commonly known as: Neurontin   hydrocortisone 10 mg tablet; Commonly known as: Cortef; Take 1.5 tablets   (15 mg) by mouth once daily in the morning.  TAKE ONE AND A HALF TABLETS   BY MOUTH IN THE MORNING THEN TAKE HALF A TABLET AT 3PM THEN TAKE HALF A   TABLET AT BEDTIME   metoprolol succinate XL 25 mg 24 hr tablet; Commonly known as:   Toprol-XL; Take 2 tablets (50 mg) by mouth once daily.   Solu-CORTEF Act-O-Vial (PF) 100 mg/2 mL injection; Generic drug:   hydrocortisone sod succ (PF)   VITAMIN B-12 INJ     STOP taking these medications     ciprofloxacin 500 mg tablet; Commonly known as: Cipro       Outpatient Follow up: "   Future Appointments   Date Time Provider Department Center   11/5/2024 12:30 PM Apple Tello MD [id number] [place]   7/1/2025 11:45 AM Delgado Partida MD [id number] [place]     PCP: Apple Tello MD   The transitional care management team of [facility] will contact patient.    Patient was also advised to call [phone] for hospital discharge follow-up with PCP in 7 day from today.          PS: This note was completed using Dragon voice recognition technology and may include unintended errors with respect to translation of words, typographical or grammar errors which may not have been identified while finalizing the chart.

## 2024-08-28 NOTE — CARE PLAN
The patient's goals for the shift include      The clinical goals for the shift include pain control    Over the shift, the patient did not make progress toward the following goals. Barriers to progression include . Recommendations to address these barriers include   Problem: Pain - Adult  Goal: Verbalizes/displays adequate comfort level or baseline comfort level  Outcome: Progressing   .

## 2024-08-28 NOTE — PROGRESS NOTES
ADMISSION DATE: 8/24/2024  HOSPITAL DAY: 3    SUBJECTIVE:  Patient was seen at bedside.  Uneventful night.    OBJECTIVE:  Vitals:    08/26/24 1235 08/26/24 1950 08/27/24 1430 08/27/24 2040   BP: 121/57 133/59 133/64 142/65   BP Location:   Right arm    Patient Position:   Lying    Pulse: 74 76 65 77   Resp:   19    Temp: 36.6 °C (97.9 °F) 36.7 °C (98.1 °F) 36.5 °C (97.7 °F) 36.5 °C (97.7 °F)   TempSrc:   Temporal    SpO2: 94% 95% 96% 94%   Weight:       Height:          Intake/Output Summary (Last 24 hours) at 8/27/2024 2321  Last data filed at 8/27/2024 1734  Gross per 24 hour   Intake 2180 ml   Output --   Net 2180 ml         Wt Readings from Last 10 Encounters:   08/24/24 89.4 kg (197 lb)   08/23/24 90.3 kg (199 lb)   07/02/24 91.2 kg (201 lb)   05/09/24 90.3 kg (199 lb)   05/06/24 89.1 kg (196 lb 6.4 oz)   04/05/24 89.6 kg (197 lb 9.6 oz)   02/28/24 89.8 kg (198 lb)   01/30/24 89.8 kg (198 lb)   01/16/24 86.6 kg (191 lb)   01/10/24 89.1 kg (196 lb 6.4 oz)       PHYSICAL EXAM:  Gen: Alert, awake, Oriented X 3. Not in any acute distress   HEENT:  Atraumatic, PERRL.  Conjunctivae clear.   Moist nasal mucous membranes. oropharynx non erythematous,   Neck:  Supple without thyromegaly or lymphadenopathy.  Lungs:  Clear to auscultation without rales, rhonchi, or rub.  Heart:  RRR, S1, S2, without M.  Abdomen:  Soft, non tender, no organ enlargement, bruit. Bowel sounds present . No CVA tenderness.  Extremities:  No edema. No calf swelling or tenderness.    Skin:  No rash, ecchymosis or erythema.    CURRENT ACTIVE MEDS:  atorvastatin, 20 mg, oral, Nightly  cefTRIAXone, 1 g, intravenous, q24h  clopidogrel, 75 mg, oral, Daily  enoxaparin, 40 mg, subcutaneous, q24h  gabapentin, 100 mg, oral, Nightly  hydroCHLOROthiazide, 25 mg, oral, Daily  hydrocortisone, 15 mg, oral, q AM  hydrocortisone, 5 mg, oral, Daily  hydrocortisone, 5 mg, oral, Nightly  hydrocortisone sodium succinate, 50 mg, intravenous, q8h  metoprolol succinate  XL, 50 mg, oral, Daily  polyethylene glycol, 17 g, oral, Daily      LAB RESULTS:   CBC:   Results from last 7 days   Lab Units 08/26/24  0530 08/25/24  0625 08/24/24  0552   WBC AUTO x10*3/uL 7.0 7.9 9.0   RBC AUTO x10*6/uL 3.52* 3.66* 4.37   HEMOGLOBIN g/dL 11.0* 11.3* 13.4   HEMATOCRIT % 32.3* 33.3* 39.8   MCV fL 92 91 91   MCH pg 31.3 30.9 30.7   MCHC g/dL 34.1 33.9 33.7   RDW % 12.8 12.6 12.9   PLATELETS AUTO x10*3/uL 197 214 275     CMP:    Results from last 7 days   Lab Units 08/27/24  0659 08/26/24  1443 08/26/24  0530 08/25/24  0625 08/24/24  0552   SODIUM mmol/L 135* 134* 137 135* 136   POTASSIUM mmol/L 4.3 4.4 4.1 4.0 4.4   CHLORIDE mmol/L 105 103 106 104 102   CO2 mmol/L 24 24 25 24 25   BUN mg/dL 19 19 22 23 23   CREATININE mg/dL 1.02 1.09* 1.21* 1.10* 1.08*   GLUCOSE mg/dL 133* 143* 101* 94 102*   PROTEIN TOTAL g/dL  --   --   --  5.7* 6.9   CALCIUM mg/dL 9.1 9.2 8.9 9.0 9.9   BILIRUBIN TOTAL mg/dL  --   --   --  0.7 0.6   ALK PHOS U/L  --   --   --  45 60   AST U/L  --   --   --  14 13   ALT U/L  --   --   --  7 10     BMP:    Results from last 7 days   Lab Units 08/27/24  0659 08/26/24  1443 08/26/24  0530   SODIUM mmol/L 135* 134* 137   POTASSIUM mmol/L 4.3 4.4 4.1   CHLORIDE mmol/L 105 103 106   CO2 mmol/L 24 24 25   BUN mg/dL 19 19 22   CREATININE mg/dL 1.02 1.09* 1.21*   CALCIUM mg/dL 9.1 9.2 8.9   GLUCOSE mg/dL 133* 143* 101*     Magnesium:  Results from last 7 days   Lab Units 08/24/24  0552   MAGNESIUM mg/dL 1.52*       Lab Results   Component Value Date    HGBA1C 5.5 06/08/2021    HGBA1C 5.4 08/08/2019    HGBA1C 5.6 06/13/2019     Lab Results   Component Value Date    CREATININE 1.02 08/27/2024       IMAGING STUDIES:  == 08/24/24 ===    CT ABDOMEN PELVIS W IV CONTRAST  1. No acute abdominal or pelvic abnormalities  2. Prior hysterectomy, bladder suspension and removal of the left  adrenal gland  3. Degenerative changes of the lumbar spine with lumbar canal  stenosis at L3-4  4. 5 mm left lower  lobe lung nodule does not require further  follow-up due to its small size per Fleischner criteria and long-term  stability.  5. The lung nodule does not require follow-up due to its small size.  Nodules less than 6 mm do not require routine follow-up in a low risk  individual according to the Fleischner Society 2017 guidelines.  6. There are bilateral renal cortical hypodensities that are  statistically most likely cysts and are similar to the previous exam    === 01/09/23 ===  US RENAL COMPLETE  Normal size kidneys without hydronephrosis.  Bilateral renal cysts.   Insignificant postvoid bladder residual of 8 cc.     PROBLEMS ON ADMISSION:  Cystitis [N30.90]  Right lower quadrant abdominal pain [R10.31]    HOSPITAL PROBLEM LIST     [place]'s disease (Multi)    Benign essential hypertension    CKD (chronic kidney disease)    UTI symptoms    Adrenal insufficiency (Multi)       ASSESSMENT AND PLAN FOR 8/28/2024  Patient is being treated with IV Ciprofloxacillin.  She does have multiple UTIs in the past.  In fact before getting admitted to this hospital she was on p.o. nitrofurantoin and ciprofloxacin.  Patient also complained about right flank pain although her abdominal x-ray, CT scan ultrasound did not show any pyelonephritis.  Her pain is not appropriate with her clinical findings.  She needs further imaging studies.  Patient has requested to consult nephrologist, Dr. Dick, as he knows her better from his prior practice.  Otherwise we will continue with current medical therapy and plan.  Will wait for urine culture and susceptibility.

## 2024-08-28 NOTE — PROGRESS NOTES
ADMISSION DATE: 8/24/2024  HOSPITAL DAY: 3    SUBJECTIVE:  Patient was seen at bedside.  Uneventful night.  Her right flank pain is little better.  She has been receiving IV morphine    OBJECTIVE:  Vitals:    08/26/24 1235 08/26/24 1950 08/27/24 1430 08/27/24 2040   BP: 121/57 133/59 133/64 142/65   BP Location:   Right arm    Patient Position:   Lying    Pulse: 74 76 65 77   Resp:   19    Temp: 36.6 °C (97.9 °F) 36.7 °C (98.1 °F) 36.5 °C (97.7 °F) 36.5 °C (97.7 °F)   TempSrc:   Temporal    SpO2: 94% 95% 96% 94%   Weight:       Height:          Intake/Output Summary (Last 24 hours) at 8/27/2024 2335  Last data filed at 8/27/2024 1734  Gross per 24 hour   Intake 2180 ml   Output --   Net 2180 ml         Wt Readings from Last 10 Encounters:   08/24/24 89.4 kg (197 lb)   08/23/24 90.3 kg (199 lb)   07/02/24 91.2 kg (201 lb)   05/09/24 90.3 kg (199 lb)   05/06/24 89.1 kg (196 lb 6.4 oz)   04/05/24 89.6 kg (197 lb 9.6 oz)   02/28/24 89.8 kg (198 lb)   01/30/24 89.8 kg (198 lb)   01/16/24 86.6 kg (191 lb)   01/10/24 89.1 kg (196 lb 6.4 oz)       PHYSICAL EXAM:  Gen: Alert, awake, Oriented X 3. Not in any acute distress   HEENT:  Atraumatic, PERRL.  Conjunctivae clear.   Moist nasal mucous membranes. oropharynx non erythematous,   Neck:  Supple without thyromegaly or lymphadenopathy.  Lungs:  Clear to auscultation without rales, rhonchi, or rub.  Heart:  RRR, S1, S2, without M.  Abdomen:  Soft, non tender, no organ enlargement, bruit. Bowel sounds present . No CVA tenderness.  Extremities:  No edema. No calf swelling or tenderness.    Skin:  No rash, ecchymosis or erythema.    CURRENT ACTIVE MEDS:  atorvastatin, 20 mg, oral, Nightly  cefTRIAXone, 1 g, intravenous, q24h  clopidogrel, 75 mg, oral, Daily  enoxaparin, 40 mg, subcutaneous, q24h  gabapentin, 100 mg, oral, Nightly  hydroCHLOROthiazide, 25 mg, oral, Daily  hydrocortisone, 15 mg, oral, q AM  hydrocortisone, 5 mg, oral, Daily  hydrocortisone, 5 mg, oral,  Nightly  hydrocortisone sodium succinate, 50 mg, intravenous, q8h  metoprolol succinate XL, 50 mg, oral, Daily  polyethylene glycol, 17 g, oral, Daily      LAB RESULTS:     CBC:   Results from last 7 days   Lab Units 08/26/24 0530 08/25/24 0625 08/24/24  0552   WBC AUTO x10*3/uL 7.0 7.9 9.0   RBC AUTO x10*6/uL 3.52* 3.66* 4.37   HEMOGLOBIN g/dL 11.0* 11.3* 13.4   HEMATOCRIT % 32.3* 33.3* 39.8   MCV fL 92 91 91   MCH pg 31.3 30.9 30.7   MCHC g/dL 34.1 33.9 33.7   RDW % 12.8 12.6 12.9   PLATELETS AUTO x10*3/uL 197 214 275     CMP:    Results from last 7 days   Lab Units 08/27/24 0659 08/26/24  1443 08/26/24  0530 08/25/24  0625 08/24/24  0552   SODIUM mmol/L 135* 134* 137 135* 136   POTASSIUM mmol/L 4.3 4.4 4.1 4.0 4.4   CHLORIDE mmol/L 105 103 106 104 102   CO2 mmol/L 24 24 25 24 25   BUN mg/dL 19 19 22 23 23   CREATININE mg/dL 1.02 1.09* 1.21* 1.10* 1.08*   GLUCOSE mg/dL 133* 143* 101* 94 102*   PROTEIN TOTAL g/dL  --   --   --  5.7* 6.9   CALCIUM mg/dL 9.1 9.2 8.9 9.0 9.9   BILIRUBIN TOTAL mg/dL  --   --   --  0.7 0.6   ALK PHOS U/L  --   --   --  45 60   AST U/L  --   --   --  14 13   ALT U/L  --   --   --  7 10     BMP:    Results from last 7 days   Lab Units 08/27/24 0659 08/26/24  1443 08/26/24  0530   SODIUM mmol/L 135* 134* 137   POTASSIUM mmol/L 4.3 4.4 4.1   CHLORIDE mmol/L 105 103 106   CO2 mmol/L 24 24 25   BUN mg/dL 19 19 22   CREATININE mg/dL 1.02 1.09* 1.21*   CALCIUM mg/dL 9.1 9.2 8.9   GLUCOSE mg/dL 133* 143* 101*     Magnesium:  Results from last 7 days   Lab Units 08/24/24  0552   MAGNESIUM mg/dL 1.52*       Lab Results   Component Value Date    HGBA1C 5.5 06/08/2021    HGBA1C 5.4 08/08/2019    HGBA1C 5.6 06/13/2019     Lab Results   Component Value Date    CREATININE 1.02 08/27/2024       Susceptibility data from last 90 days.  Collected Specimen Info Organism Amoxicillin/Clavulanate Ampicillin Ampicillin/Sulbactam Cefazolin Cefazolin (uncomplicated UTIs only) Ciprofloxacin Gentamicin  Nitrofurantoin Piperacillin/Tazobactam Trimethoprim/Sulfamethoxazole   08/24/24 Urine from Clean Catch/Voided Escherichia coli  S  R  I  S  S  R  S  S  S  R   08/23/24 Urine from Clean Catch/Voided Escherichia coli  I  R  I  S  S  R  S  S  S  R        IMAGING STUDIES:  == 08/24/24 ===    CT ABDOMEN PELVIS W IV CONTRAST  1. No acute abdominal or pelvic abnormalities  2. Prior hysterectomy, bladder suspension and removal of the left  adrenal gland  3. Degenerative changes of the lumbar spine with lumbar canal  stenosis at L3-4  4. 5 mm left lower lobe lung nodule does not require further  follow-up due to its small size per Fleischner criteria and long-term  stability.  5. The lung nodule does not require follow-up due to its small size.  Nodules less than 6 mm do not require routine follow-up in a low risk  individual according to the Fleischner Society 2017 guidelines.  6. There are bilateral renal cortical hypodensities that are  statistically most likely cysts and are similar to the previous exam    === 01/09/23 ===  US RENAL COMPLETE  Normal size kidneys without hydronephrosis.  Bilateral renal cysts.   Insignificant postvoid bladder residual of 8 cc.     PROBLEMS ON ADMISSION:  Cystitis [N30.90]  Right lower quadrant abdominal pain [R10.31]    HOSPITAL PROBLEM LIST     [place]'s disease (Multi)    Benign essential hypertension    CKD (chronic kidney disease)    UTI symptoms    Adrenal insufficiency (Multi)       ASSESSMENT AND PLAN FOR 8/28/2024  Urine culture grew E. coli which is not sensitive to ciprofloxacin.  We have to change the antibiotic to IV ceftriaxone.  Patient been complaining of right flank pain more on the midline.  I want to exclude any of the lumbar spine pathology.  She will be going for MRI of the lumbar spine.  Meanwhile she was seen by the nephrologist, Dr. Martin.  Appreciate consult recommendation.  Will continue with current medical therapy and plan.  Depending on the MRI result therapy  will be coordinated.

## 2024-08-28 NOTE — PROGRESS NOTES
Endocrinology Progress Note  Patient: Daysi Resendiz  Unit/Bed: 1115/1115-A  YOB: 1950  MRN: [id number]  Acct: [id number]   Admitting Diagnosis: Cystitis [N30.90]  Right lower quadrant abdominal pain [R10.31]  Date:  8/24/2024  Hospital Day: 4  Attending: Vinnie Gonzales MD       Complaint:  Chief Complaint   Patient presents with    Abdominal Pain     UTI symptoms, put on antibiotics yesterday by PCP, pain as gotten worse.          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    BPV (benign positional vertigo)    Bruit of right carotid artery    Carotid artery stenosis    Cognitive complaints    Cognitive impairment    Dry cough    Decreased coordination    Decreased GFR    Dysuria    Elevated glucose    Fatigue    Fever    Frequency-urgency syndrome    Gait disturbance    Headache    Imbalance    Incomplete bladder emptying    Knee effusion    Knee pain    Hyperlipidemia    Osteoarthritis of left knee    Positive colorectal cancer screening using DNA-based stool test    Ptosis    Ptosis of left eyelid    Recurrent urinary tract infection    Renal insufficiency    S/P TKR (total knee replacement)    Shortness of breath on exertion    Sinus congestion    CKD (chronic kidney disease)    Varicose veins of leg with swelling    Varicose veins with pain    Venous stasis of both lower extremities    Vertigo    Vision changes    Acute diarrhea    Acute sinus infection    Ankle pain, left    Arthralgia    Chronic venous hypertension with complication involving both sides    Cyst of right kidney    Genitourinary syndrome of menopause     Hyperuricemia    Inflammatory arthritis    Intercritical gout    Nausea in adult    Overweight with body mass index (BMI) of 28 to 28.9 in adult    Pain of left hip joint    Cystocele, unspecified    Platelet inhibition due to Plavix    Positive anti-CCP test    Recurrent cystitis    Rib injury    Encounter to discuss test results    Breast cancer screening by mammogram    UTI symptoms    Adrenal insufficiency (Multi)    Urinary frequency    Post-adrenalectomy adrenal insufficiency (Multi)    Left adrenal mass (Multi)    History of claustrophobia    Exposure to COVID-19 virus    COVID-19 virus infection    Coccyx pain    Chills    Renal cysts, acquired, bilateral    Dizziness    Left flank pain    Encounter for completion of form with patient    Left upper quadrant abdominal pain    LLQ abdominal pain    Former smoker    BMI 31.0-31.9,adult    Right lower quadrant abdominal pain          Plan:  Adrenal insufficiency patient on hydrocortisone IV we will switch her back to her daily dose of 15 mg in the morning 5 in the afternoon and 5 at night when she goes home and follow-up with me in the next 1 week        SUBJECTIVE    The patient is doing much better today sitting up wanting to go home  MRI showed disc problems in L2-L3 L3-L4 probably because of her pain which is better now she is in the higher dose of hydrocortisone IV        VITALS      Vitals:    08/26/24 1950 08/27/24 1430 08/27/24 2040 08/28/24 0614   BP: 133/59 133/64 142/65 139/66   BP Location:  Right arm     Patient Position:  Lying     Pulse: 76 65 77 73   Resp:  19 18    Temp: 36.7 °C (98.1 °F) 36.5 °C (97.7 °F) 36.5 °C (97.7 °F) 36.4 °C (97.5 °F)   TempSrc:  Temporal     SpO2: 95% 96% 94% 94%   Weight:       Height:           Intake/Output Summary (Last 24 hours) at 8/28/2024 1321  Last data filed at 8/28/2024 1231  Gross per 24 hour   Intake 2820 ml   Output --   Net 2820 ml      Wt Readings from Last 4 Encounters:   08/24/24 89.4 kg (197 lb)  "  08/23/24 90.3 kg (199 lb)   07/02/24 91.2 kg (201 lb)   05/09/24 90.3 kg (199 lb)        Allergies:  Allergies   Allergen Reactions    Bee Venom Protein (Honey Bee) Anaphylaxis    Labetalol Other     \"RAPID HEARTBEAT\"    Amoxicillin Itching    Aspirin Hives    Influenza Virus Vaccine, Live Attenuated GI Upset    Influenza Virus Vaccines GI Upset    Losartan-Hydrochlorothiazide Other     DECREASE KIDNEY FUNCTION    Methylprednisolone Hives    Metoclopramide Other     raised B/P    Midazolam Other     had stroke    Pneumoc 13-Yanet Conj-Dip Cr(Pf) Other     RAISED BLOOD PRESSURE    Pneumococcal 23-Yanet Ps Vaccine Other     \"RAISED BLOOD PRESSURE\"    Pneumococcal 23-Valent Polysaccharide Vaccine Other     \"RAISED BLOOD PRESSURE\"    Sulfa (Sulfonamide Antibiotics) Other     \"FEELS LIKE BUNCH OF BEE STINGS\"        PHYSICAL EXAM   Physical Exam  Patient doing much better going home today    LABS   Magnesium:  Results from last 7 days   Lab Units 08/24/24  0552   MAGNESIUM mg/dL 1.52*     Lipid Panel:       Lab Review  Lab Results   Component Value Date    BILITOT 0.7 08/25/2024    CALCIUM 9.1 08/28/2024    CO2 26 08/28/2024     08/28/2024    CREATININE 0.91 08/28/2024    GLUCOSE 143 (H) 08/28/2024    ALKPHOS 45 08/25/2024    K 3.8 08/28/2024    PROT 5.7 (L) 08/25/2024     08/28/2024    AST 14 08/25/2024    ALT 7 08/25/2024    BUN 19 08/28/2024    ANIONGAP 11 08/28/2024    MG 1.52 (L) 08/24/2024    PHOS 3.2 08/28/2024    ALBUMIN 3.4 08/28/2024    GFRF 59 (A) 09/18/2023     Lab Results   Component Value Date    TRIG 113 05/10/2022    CHOL 153 05/10/2022    HDL 49.0 05/10/2022     Lab Results   Component Value Date    HGBA1C 5.5 06/08/2021    HGBA1C 5.4 08/08/2019    HGBA1C 5.6 06/13/2019     The ASCVD Risk score (Amanda DODSON, et al., 2019) failed to calculate for the following reasons:    The patient has a prior MI or stroke diagnosis   Lab Results   Component Value Date    HGBA1C 5.5 06/08/2021    HGBA1C 5.4 " 08/08/2019    HGBA1C 5.6 06/13/2019     08/28/2024    K 3.8 08/28/2024     08/28/2024    CO2 26 08/28/2024    BUN 19 08/28/2024    CREATININE 0.91 08/28/2024    CALCIUM 9.1 08/28/2024    ALBUMIN 3.4 08/28/2024    PROT 5.7 (L) 08/25/2024    BILITOT 0.7 08/25/2024    ALKPHOS 45 08/25/2024    ALT 7 08/25/2024    AST 14 08/25/2024    GLUCOSE 143 (H) 08/28/2024    CHOL 153 05/10/2022    TRIG 113 05/10/2022    HDL 49.0 05/10/2022        atorvastatin, 20 mg, oral, Nightly  cefTRIAXone, 1 g, intravenous, q24h  clopidogrel, 75 mg, oral, Daily  enoxaparin, 40 mg, subcutaneous, q24h  gabapentin, 100 mg, oral, Nightly  hydroCHLOROthiazide, 25 mg, oral, Daily  hydrocortisone, 15 mg, oral, q AM  hydrocortisone, 5 mg, oral, Daily  hydrocortisone, 5 mg, oral, Nightly  hydrocortisone sodium succinate, 50 mg, intravenous, q8h  metoprolol succinate XL, 50 mg, oral, Daily  polyethylene glycol, 17 g, oral, Daily             Electronically signed by Bel Zimmer MD on 8/28/2024 at 1:21 PM

## 2024-08-28 NOTE — PROGRESS NOTES
Renal Progress Note    Assessment/  74 y.o. year old female who presented initially to her primary care for further evaluation and management of relatively acute onset and severe progressive course of pain described as right lower quadrant and radiates to the right flank not associated with dysuria fever or chills patient felt that this is could be a UTI he did receive outpatient antibiotic that failed and patient was advised to come to the hospital for possible UTI complicated with right pyelonephritis CT scan with IV contrast was done did not show any obstructive uropathy nephrolithiasis or evidence suggesting pyelonephritis she does have stable cysts with no change compared to prior study  Patient did have a recent MRI ordered by me there was benign renal cysts that has not been changed compared to the colon CT scan  Patient does have chronic comorbidity of [place]'s disease and history of multiple UTI her baseline creatinine fluctuated between 0.9 and 1.2 she does consume enough fluids     Patient at her baseline GFR with no associated electrolyte or acid-base imbalance that can contribute to [place] hemodynamically stable afebrile nonoliguric none polyuric by moving the patient hip joint induces the pain she did receive pain medication but it was painful however urine sediment suggest UTI with more than 100,000 enteric bacilli the sensitivity of which has not been posted patient already on Cipro        Plan/  Will sign off patient can be discharged to follow-up outpatient in 2 to 3 weeks please do not hesitate to call as needed thank you for your consult  Subjective:   Admit Date: 8/24/2024    Interval History: Seen and examined uneventful night no uremic related or fluid volume overload related symptoms pain is better      Medications:   Scheduled Meds:atorvastatin, 20 mg, oral, Nightly  cefTRIAXone, 1 g, intravenous, q24h  clopidogrel, 75 mg, oral, Daily  enoxaparin, 40 mg, subcutaneous, q24h  gabapentin, 100  "mg, oral, Nightly  hydroCHLOROthiazide, 25 mg, oral, Daily  hydrocortisone, 15 mg, oral, q AM  hydrocortisone, 5 mg, oral, Daily  hydrocortisone, 5 mg, oral, Nightly  hydrocortisone sodium succinate, 50 mg, intravenous, q8h  metoprolol succinate XL, 50 mg, oral, Daily  polyethylene glycol, 17 g, oral, Daily      Continuous Infusions:sodium chloride 0.9%, 75 mL/hr, Last Rate: 75 mL/hr (08/27/24 1734)        CBC:   Lab Results   Component Value Date    HGB 11.0 (L) 08/26/2024    WBC 7.0 08/26/2024     08/26/2024      Anemia:  No results found for: \"FERRITIN\", \"IRON\", \"TIBC\"   BMP:    Lab Results   Component Value Date     08/28/2024     (L) 08/27/2024    K 3.8 08/28/2024    K 4.3 08/27/2024     08/28/2024     08/27/2024    CO2 26 08/28/2024    CO2 24 08/27/2024    BUN 19 08/28/2024    BUN 19 08/27/2024    CREATININE 0.91 08/28/2024    CREATININE 1.02 08/27/2024      Bone disease:   Lab Results   Component Value Date    PHOS 3.2 08/28/2024      Urinalysis:    Lab Results   Component Value Date    YAMILET 5.5 08/26/2024    PROTUR NEGATIVE 08/26/2024    GLUCOSEU Normal 08/26/2024    BLOODU NEGATIVE 08/26/2024    KETONESU NEGATIVE 08/26/2024    BILIRUBINU NEGATIVE 08/26/2024    NITRITEU NEGATIVE 08/26/2024    LEUKOCYTESU NEGATIVE 08/26/2024        Objective:   Vitals: /66   Pulse 73   Temp 36.4 °C (97.5 °F)   Resp 18   Ht 1.721 m (5' 7.75\")   Wt 89.4 kg (197 lb)   SpO2 94%   BMI 30.18 kg/m²    Wt Readings from Last 3 Encounters:   08/24/24 89.4 kg (197 lb)   08/23/24 90.3 kg (199 lb)   07/02/24 91.2 kg (201 lb)      24HR INTAKE/OUTPUT:    Intake/Output Summary (Last 24 hours) at 8/28/2024 1253  Last data filed at 8/28/2024 1231  Gross per 24 hour   Intake 2820 ml   Output --   Net 2820 ml     Admission weight:  Weight: 89.4 kg (197 lb)      Constitutional:  Alert, awake, no apparent distress   Skin:normal, no rash  HEENT:sclera anicteric.  Head atraumatic normocephalic  Neck:supple " with no thyromegally  Cardiovascular:  S1, S2 without m/r/g   Respiratory:  CTA B without w/r/r   Abdomen: +bs, soft, nt  Ext: no LE edema  Musculoskeletal:Intact  Neuro:Alert and oriented with no deficit      Electronically signed by Raven Dick MD on 8/28/2024 at 12:53 PM

## 2024-08-28 NOTE — CARE PLAN
Problem: Pain - Adult  Goal: Verbalizes/displays adequate comfort level or baseline comfort level  Outcome: Adequate for Discharge     Problem: Safety - Adult  Goal: Free from fall injury  Outcome: Adequate for Discharge     Problem: Discharge Planning  Goal: Discharge to home or other facility with appropriate resources  Outcome: Adequate for Discharge     Problem: Chronic Conditions and Co-morbidities  Goal: Patient's chronic conditions and co-morbidity symptoms are monitored and maintained or improved  Outcome: Adequate for Discharge     Problem: Pain  Goal: Takes deep breaths with improved pain control throughout the shift  Outcome: Adequate for Discharge  Goal: Turns in bed with improved pain control throughout the shift  Outcome: Adequate for Discharge  Goal: Walks with improved pain control throughout the shift  Outcome: Adequate for Discharge  Goal: Performs ADL's with improved pain control throughout shift  Outcome: Adequate for Discharge  Goal: Participates in PT with improved pain control throughout the shift  Outcome: Adequate for Discharge  Goal: Free from opioid side effects throughout the shift  Outcome: Adequate for Discharge  Goal: Free from acute confusion related to pain meds throughout the shift  Outcome: Adequate for Discharge     Problem: Fall/Injury  Goal: Not fall by end of shift  Outcome: Adequate for Discharge  Goal: Be free from injury by end of the shift  Outcome: Adequate for Discharge  Goal: Verbalize understanding of personal risk factors for fall in the hospital  Outcome: Adequate for Discharge  Goal: Verbalize understanding of risk factor reduction measures to prevent injury from fall in the home  Outcome: Adequate for Discharge  Goal: Use assistive devices by end of the shift  Outcome: Adequate for Discharge  Goal: Pace activities to prevent fatigue by end of the shift  Outcome: Adequate for Discharge     Problem: Skin  Goal: Participates in plan/prevention/treatment  measures  8/28/2024 1307 by Delmis Lambert RN  Outcome: Adequate for Discharge  8/28/2024 0746 by Delmis Lambert RN  Flowsheets (Taken 8/28/2024 0746)  Participates in plan/prevention/treatment measures: Increase activity/out of bed for meals  Goal: Prevent/manage excess moisture  8/28/2024 1307 by Delmis Lambert RN  Outcome: Adequate for Discharge  8/28/2024 0746 by Delmis Lambert RN  Flowsheets (Taken 8/28/2024 0746)  Prevent/manage excess moisture: Moisturize dry skin  Goal: Prevent/minimize sheer/friction injuries  8/28/2024 1307 by Delmis Lambert RN  Outcome: Adequate for Discharge  8/28/2024 0746 by Delmis Lambert RN  Flowsheets (Taken 8/28/2024 0746)  Prevent/minimize sheer/friction injuries: Increase activity/out of bed for meals  Goal: Promote/optimize nutrition  8/28/2024 1307 by Delmis Lambert RN  Outcome: Adequate for Discharge  8/28/2024 0746 by Delmis Lambert RN  Flowsheets (Taken 8/28/2024 0746)  Promote/optimize nutrition:   Monitor/record intake including meals   Offer water/supplements/favorite foods   The patient's goals for the shift include      The clinical goals for the shift include Patient's pain will be managed to a tolerable level throughout this shift.    Over the shift, the patient did make progress adequate for discharge toward care plan goals.

## 2024-08-29 ENCOUNTER — TELEPHONE (OUTPATIENT)
Dept: PRIMARY CARE | Facility: CLINIC | Age: 74
End: 2024-08-29
Payer: COMMERCIAL

## 2024-08-29 ENCOUNTER — PATIENT OUTREACH (OUTPATIENT)
Dept: PRIMARY CARE | Facility: CLINIC | Age: 74
End: 2024-08-29
Payer: COMMERCIAL

## 2024-08-29 DIAGNOSIS — M62.838 MUSCLE SPASM: Primary | ICD-10-CM

## 2024-08-29 RX ORDER — METHOCARBAMOL 500 MG/1
500 TABLET, FILM COATED ORAL 4 TIMES DAILY PRN
Qty: 20 TABLET | Refills: 1 | Status: SHIPPED | OUTPATIENT
Start: 2024-08-29 | End: 2025-08-29

## 2024-08-29 NOTE — PROGRESS NOTES
Discharge Facility:  [facility]    Discharge Diagnosis:  Right lower quadrant abdominal pain   UTI    Admission Date:  8/24/24  Discharge Date:   8/28/24    PCP Appointment Date:TBD-I am unable to make an appt due to no openings . Message sent to office staff requesting assistance.    Specialist Appointment Date:   Endo-  Hospital Encounter and Summary Linked: Yes    See discharge assessment below for further details  Medications  Medications reviewed with patient/caregiver?: Yes (8/29/2024  3:02 PM)  Is the patient having any side effects they believe may be caused by any medication additions or changes?: No (8/29/2024  3:02 PM)  Does the patient have all medications ordered at discharge?: Yes (8/29/2024  3:02 PM)  Prescription Comments: START taking these medications     cephalexin 500 mg capsule; Commonly known as: Keflex; Take 1 capsule   (500 mg) by mouth 3 times a day for 10 days.     CHANGE how you take these medications     hydroCHLOROthiazide 25 mg tablet; Commonly known as: HYDRODiuril; Take 1   tablet (25 mg) by mouth once daily.; What changed: medication strength,   how much to take, when to take this    STOP taking these medications     ciprofloxacin 500 mg tablet; Commonly known as: Cipro (8/29/2024  3:02 PM)  Is the patient taking all medications as directed (includes completed medication regime)?: Yes (8/29/2024  3:02 PM)  Care Management Interventions: Provided patient education (8/29/2024  3:02 PM)  Medication Comments: CM discussed referencing discharge paperwork to follow detailed daily medication schedule. Patient endorses understanding & compliance with medications. (8/29/2024  3:02 PM)    Appointments  Does the patient have a primary care provider?: Yes (8/29/2024  3:02 PM)  Care Management Interventions: Educated patient on importance of making appointment (Messaged clerical staff if PCP office this morning to reach out to pt to schedule follow up- pt aware)  (8/29/2024  3:02 PM)  Has the patient kept scheduled appointments due by today?: Yes (8/29/2024  3:02 PM)  Care Management Interventions: Advised to schedule with specialist (Daysi said that they are working to get specialist in  system, I did not see referrals places so will need to be done at PCP follow up) (8/29/2024  3:02 PM)    Self Management  Has home health visited the patient within 72 hours of discharge?: Not applicable (8/29/2024  3:02 PM)  What Durable Medical Equipment (DME) was ordered?: n/a (8/29/2024  3:02 PM)    Patient Teaching  Does the patient have access to their discharge instructions?: Yes (8/29/2024  3:02 PM)  Care Management Interventions: Reviewed instructions with patient; Educated on MyChart (8/29/2024  3:02 PM)  What is the patient's perception of their health status since discharge?: Worsening (Pain level is worse today but patient declined recommedation to go to ER. She has appt into Dr Gonzales to ask if she can have something besides tylenol. He had mentioned to her to let him know if she needed it. I advised pt to call office back by 3:30 p) (8/29/2024  3:02 PM)  Is the patient/caregiver able to teach back the hierarchy of who to call/visit for symptoms/problems? PCP, Specialist, Home Health nurse, Urgent Care, ED, 911: Yes (8/29/2024  3:02 PM)  Patient/Caregiver Education Comments: Successful transition of care outreach with patient. CM introduced myself and the TCM program to Daysi Resendiz. Reviewed hospital stay and answered any questions. Patient denies any further discharge questions/needs at this time. CM gave my contact information and encouraged to call if needing assistance or has any further non-emergent questions prior to my next outreach. (8/29/2024  3:02 PM)

## 2024-08-29 NOTE — TELEPHONE ENCOUNTER
Patient called office today advising that she was recently discharged from the hospital and was advised by Jnaet Santana to call if she was having any issues or not feeling well. She advises that she's starting to have pain in her side again and the tylenol is not helping. Please advise.

## 2024-09-03 ENCOUNTER — TELEPHONE (OUTPATIENT)
Dept: PRIMARY CARE | Facility: CLINIC | Age: 74
End: 2024-09-03
Payer: COMMERCIAL

## 2024-09-03 NOTE — TELEPHONE ENCOUNTER
----- Message from Apple Tello sent at 8/30/2024  3:52 PM EDT -----  Regarding: FW: TCM appt needed  Please let Daysi know that I got her message.  I would recommend trying a muscle relaxant because muscle spasm can cause a lot of pain.  She can still have Tylenol with it if needed.  She can try to use the topical lidoderm patches.  She can try ice if needed.  I usually do not prescribe any stronger medication. If this pain does not start to ease up, I can order a consultation with Pain Mgt for her.  She can let us know.  ----- Message -----  From: Gabriela Pablo MA  Sent: 8/30/2024   3:47 PM EDT  To: Apple Tello MD  Subject: FW: TCM appt needed                                ----- Message -----  From: Vinnie Gonzales MD  Sent: 8/30/2024   2:19 PM EDT  To: [facility] Clinical Support Staff  Subject: RE: TCM appt needed                              Not me.   PCP can help.      MQM  ----- Message -----  From: Mariel Gottlieb  Sent: 8/29/2024   4:11 PM EDT  To: Vinnie Gonzales MD; #  Subject: FW: TCM appt needed                              Patient has POV 9/5/2024 HOSP DC Patient further advises she is in a great deal of pain and is seeking your advice, requesting a return call.  ----- Message -----  From: Darcy Miller LPN  Sent: 8/29/2024  12:07 PM EDT  To: [facility] Clerical  Subject: TCM appt needed                                  Hello,  Can you please contact pt to schedule hospital follow up within 14 days of discharge.    Discharge Facility:  [facility]    Discharge Diagnosis:  Right lower quadrant abdominal pain   UTI    Admission Date:  8/24/24  Discharge Date:   8/28/24          Thank you,  Darcy Miller LPN   Care Transitions Specialist

## 2024-09-05 ENCOUNTER — APPOINTMENT (OUTPATIENT)
Dept: PRIMARY CARE | Facility: CLINIC | Age: 74
End: 2024-09-05
Payer: COMMERCIAL

## 2024-09-05 VITALS
SYSTOLIC BLOOD PRESSURE: 128 MMHG | BODY MASS INDEX: 29.8 KG/M2 | WEIGHT: 196.6 LBS | OXYGEN SATURATION: 95 % | TEMPERATURE: 98.2 F | HEART RATE: 85 BPM | HEIGHT: 68 IN | DIASTOLIC BLOOD PRESSURE: 76 MMHG

## 2024-09-05 DIAGNOSIS — Z09 HOSPITAL DISCHARGE FOLLOW-UP: Primary | ICD-10-CM

## 2024-09-05 DIAGNOSIS — M54.50 ACUTE RIGHT-SIDED LOW BACK PAIN WITHOUT SCIATICA: ICD-10-CM

## 2024-09-05 DIAGNOSIS — R39.9 UTI SYMPTOMS: ICD-10-CM

## 2024-09-05 PROCEDURE — 3074F SYST BP LT 130 MM HG: CPT | Performed by: INTERNAL MEDICINE

## 2024-09-05 PROCEDURE — 99496 TRANSJ CARE MGMT HIGH F2F 7D: CPT | Performed by: INTERNAL MEDICINE

## 2024-09-05 PROCEDURE — 1158F ADVNC CARE PLAN TLK DOCD: CPT | Performed by: INTERNAL MEDICINE

## 2024-09-05 PROCEDURE — 1111F DSCHRG MED/CURRENT MED MERGE: CPT | Performed by: INTERNAL MEDICINE

## 2024-09-05 PROCEDURE — 1159F MED LIST DOCD IN RCRD: CPT | Performed by: INTERNAL MEDICINE

## 2024-09-05 PROCEDURE — 3078F DIAST BP <80 MM HG: CPT | Performed by: INTERNAL MEDICINE

## 2024-09-05 PROCEDURE — 1123F ACP DISCUSS/DSCN MKR DOCD: CPT | Performed by: INTERNAL MEDICINE

## 2024-09-05 PROCEDURE — 1157F ADVNC CARE PLAN IN RCRD: CPT | Performed by: INTERNAL MEDICINE

## 2024-09-05 PROCEDURE — 1036F TOBACCO NON-USER: CPT | Performed by: INTERNAL MEDICINE

## 2024-09-05 ASSESSMENT — PATIENT HEALTH QUESTIONNAIRE - PHQ9: 1. LITTLE INTEREST OR PLEASURE IN DOING THINGS: NOT AT ALL

## 2024-09-05 ASSESSMENT — ENCOUNTER SYMPTOMS
PALPITATIONS: 0
EYE REDNESS: 0
FEVER: 0
ACTIVITY CHANGE: 0
HEMATURIA: 0
BLOOD IN STOOL: 0
LIGHT-HEADEDNESS: 0
STRIDOR: 0
JOINT SWELLING: 0
CHEST TIGHTNESS: 0
SPEECH DIFFICULTY: 0

## 2024-09-05 NOTE — PROGRESS NOTES
"Patient: Daysi Resendiz  : 1950  PCP: Apple Tello MD  MRN: [id number]  Program: Transitional Care Management  Status: Enrolled  Effective Dates: 2024 - present  Responsible Staff: Darcy Miller LPN  Social Determinants to be Addressed: No information to display         Daysi Resendiz is a 74 y.o. female presenting today for follow-up after being discharged from the hospital 7 days ago. The main problem requiring admission was pyelonephritis, UTI and low back pain due to L2-L3 spinal stenosis. The discharge summary and/or Transitional Care Management documentation was reviewed. Medication reconciliation was performed as indicated via the \"David as Reviewed\" timestamp.  I have reviewed ER visit summary, ER physician's assessment, imaging studies, biochemical lab results and EKG.  I have also reviewed hospital course, consultants' notes, med reconciliation and discharge summary.    Daysi Resendiz was contacted by Transitional Care Management services two days after her discharge. This encounter and supporting documentation was reviewed.    Review of Systems   Constitutional:  Negative for activity change and fever.   HENT:  Negative for hearing loss, nosebleeds and tinnitus.    Eyes:  Negative for redness.   Respiratory:  Negative for chest tightness and stridor.    Cardiovascular:  Negative for chest pain, palpitations and leg swelling.   Gastrointestinal:  Negative for blood in stool.   Endocrine: Negative for cold intolerance.   Genitourinary:  Negative for hematuria.   Musculoskeletal:  Negative for joint swelling.   Skin:  Negative for rash.   Neurological:  Negative for speech difficulty and light-headedness.   Psychiatric/Behavioral:  Negative for behavioral problems.        /76 (BP Location: Left arm, Patient Position: Sitting)   Pulse 85   Temp 36.8 °C (98.2 °F)   Ht 1.727 m (5' 8\")   Wt 89.2 kg (196 lb 9.6 oz)   SpO2 95% Comment: RA  BMI 29.89 kg/m²     Physical " Exam  Constitutional:       General: She is not in acute distress.     Appearance: Normal appearance.   HENT:      Head: Normocephalic.      Right Ear: Tympanic membrane normal.      Left Ear: Tympanic membrane normal.      Mouth/Throat:      Mouth: Mucous membranes are moist.   Cardiovascular:      Rate and Rhythm: Normal rate and regular rhythm.      Heart sounds: No murmur heard.  Pulmonary:      Effort: No respiratory distress.   Abdominal:      Palpations: Abdomen is soft.   Musculoskeletal:      Cervical back: Neck supple.      Right lower leg: No edema.      Left lower leg: No edema.   Skin:     Findings: No rash.   Neurological:      General: No focal deficit present.      Mental Status: She is alert and oriented to person, place, and time.   Psychiatric:         Mood and Affect: Mood normal.       The complexity of medical decision making for this patient's transitional care is moderate.    Assessment/Plan     1. Hospital discharge follow-up  Post discharge F/U; all concerns and questions were answered. Patient is now back to normal self and at baseline.      2. Acute right-sided low back pain without sciatica  Referral to Orthopaedic Surgery      3. UTI symptoms  Resolved          MEDICAL DECISION MAKING:  - The current and active medical co morbidities have been considered.   - Recent lab work and relevant imaging studies have been reviewed.    - Relevant correspondence/notes from other specialty consultants were reviewed.    - Patient was given treatment as per above plan.   - Next Follow up in 3 months with PCP

## 2024-09-19 ENCOUNTER — HOSPITAL ENCOUNTER (OUTPATIENT)
Dept: RADIOLOGY | Facility: CLINIC | Age: 74
Discharge: HOME | End: 2024-09-19
Payer: COMMERCIAL

## 2024-09-19 ENCOUNTER — OFFICE VISIT (OUTPATIENT)
Dept: ORTHOPEDIC SURGERY | Facility: CLINIC | Age: 74
End: 2024-09-19
Payer: COMMERCIAL

## 2024-09-19 ENCOUNTER — PATIENT OUTREACH (OUTPATIENT)
Dept: PRIMARY CARE | Facility: CLINIC | Age: 74
End: 2024-09-19
Payer: COMMERCIAL

## 2024-09-19 DIAGNOSIS — M54.50 ACUTE RIGHT-SIDED LOW BACK PAIN WITHOUT SCIATICA: ICD-10-CM

## 2024-09-19 DIAGNOSIS — M54.10 BACK PAIN WITH RADICULOPATHY: ICD-10-CM

## 2024-09-19 DIAGNOSIS — M54.10 BACK PAIN WITH RADICULOPATHY: Primary | ICD-10-CM

## 2024-09-19 PROCEDURE — 1111F DSCHRG MED/CURRENT MED MERGE: CPT | Performed by: PHYSICIAN ASSISTANT

## 2024-09-19 PROCEDURE — 1157F ADVNC CARE PLAN IN RCRD: CPT | Performed by: PHYSICIAN ASSISTANT

## 2024-09-19 PROCEDURE — 1123F ACP DISCUSS/DSCN MKR DOCD: CPT | Performed by: PHYSICIAN ASSISTANT

## 2024-09-19 PROCEDURE — 99214 OFFICE O/P EST MOD 30 MIN: CPT | Performed by: PHYSICIAN ASSISTANT

## 2024-09-19 PROCEDURE — 72120 X-RAY BEND ONLY L-S SPINE: CPT

## 2024-09-19 PROCEDURE — 72110 X-RAY EXAM L-2 SPINE 4/>VWS: CPT | Performed by: ORTHOPAEDIC SURGERY

## 2024-09-19 PROCEDURE — 1159F MED LIST DOCD IN RCRD: CPT | Performed by: PHYSICIAN ASSISTANT

## 2024-09-19 PROCEDURE — 1036F TOBACCO NON-USER: CPT | Performed by: PHYSICIAN ASSISTANT

## 2024-09-19 PROCEDURE — 99204 OFFICE O/P NEW MOD 45 MIN: CPT | Performed by: PHYSICIAN ASSISTANT

## 2024-09-19 NOTE — PROGRESS NOTES
New patient to us new to the practice comes to the office with her  who is an historian.  Patient was in the hospital for low back pain and UTI right hip pain right buttock pain some leg symptoms and even some left-sided symptoms.  Those have all actually subsided since she took a muscle relaxant she denies bowel or bladder complaints at this point denies saddle anesthesia denies gait or balance changes denies any spine surgeries.  She is actually not feeling bad today.    We read the emergency room doctors note and hospital notes and agree with their treatment referral to us for back pain leg symptoms.  We looked at patient's lab work.  We checked a metabolic panel glucose 94 sodium 135 potassium 4.0.  Looked at primary doctors note we checked medication list see if she is on a statin to cause muscular aches and pains she is taking Lipitor and she is on Plavix for blood thinner.    Physical exam: Patient morbidly obese per BMI standards.  No lymphangitis or lymphadenopathy in the examined extremities.  Good perfusion to the extremities ×4.  Radial and dorsalis pedis pulses 2+.  Capillary refill to all 4 digits brisk.  No distal edema x 4.  Gait normal.  Can walk on heels and toes.  Examination of the neck reveals no swelling, step-off, or point tenderness.  Range of motion with flexion, extension, side bending and rotation is well maintained without crepitance, instability, or exacerbation of pain.  Strength is within normal limits.  Decreased range of motion flexion extension rotation lumbar spine mildly tender good strength no instability.  No issues with the upper extremities moving them all full range of motion motor strength and neurologically intact.  Mildly tender in the left greater trochanter otherwise examination of the lower extremities reveals no point tenderness, swelling, or deformity.  Range of motion of the hips, knees, and ankles are full without crepitance, instability, or exacerbation of  pain.  Strength is 5/5 throughout.  No redness, abrasions, or lesions on all 4 extremities, head and neck, or trunk.  Gross sensation intact in the extremities ×4.  Deep tendon reflexes 2+ and symmetric bilaterally.  Cathleen, clonus, and Babinski were negative.  Straight leg raise negative.  Affect normal.  Alert and oriented ×3.  Coordination normal.    X-ray: X-ray lumbar spine taken today and reviewed AP lateral shows a lot of arthritic degenerative changes.  Right sided lateral osteophytes at L4-5.  Severe degenerative disc disease L4 L5-S1 L4-5 L3-4 L2-3 mild retrolisthesis of 1 and 2.  No obvious fractures dislocations bony lytic lesions look at CTs of the lumbar spine there were done at the hospital and read them showing degenerative changes.  We looked at an MRI of the lumbar spine that was taken and that report was reviewed as well showing severe degenerative disc disease L2-3 L3-4 L4-5 L5-S1 there is bulging disc L2-3 L3-4 L5-S1.  There is central stenosis and foraminal stenosis at multiple levels.    Assessment: This the patient had back pain and some radicular symptoms into the hip and down the leg slightly although this is resolved with muscle relaxants and what ever medications she was taking from the hospital.    Plan: We will  get the patient into physical therapy to prevent these episodes from happening more frequently she will follow-up as needed

## 2024-09-19 NOTE — PROGRESS NOTES
Unable to reach patient for call back after patient's follow up appointment with PCP 9/5/24   LVM with call back number for patient to call if needed to assist with any questions or concerns patient may have.

## 2024-10-15 ENCOUNTER — PATIENT OUTREACH (OUTPATIENT)
Dept: PRIMARY CARE | Facility: CLINIC | Age: 74
End: 2024-10-15
Payer: COMMERCIAL

## 2024-11-05 ENCOUNTER — APPOINTMENT (OUTPATIENT)
Dept: PRIMARY CARE | Facility: CLINIC | Age: 74
End: 2024-11-05
Payer: COMMERCIAL

## 2024-11-05 VITALS
SYSTOLIC BLOOD PRESSURE: 130 MMHG | WEIGHT: 200 LBS | HEART RATE: 74 BPM | BODY MASS INDEX: 30.41 KG/M2 | DIASTOLIC BLOOD PRESSURE: 60 MMHG | TEMPERATURE: 96.3 F

## 2024-11-05 DIAGNOSIS — R82.90 ABNORMAL URINE ODOR: ICD-10-CM

## 2024-11-05 DIAGNOSIS — Z00.00 ROUTINE GENERAL MEDICAL EXAMINATION AT HEALTH CARE FACILITY: ICD-10-CM

## 2024-11-05 DIAGNOSIS — Z87.440 HISTORY OF UTI: ICD-10-CM

## 2024-11-05 DIAGNOSIS — I10 HYPERTENSION, UNSPECIFIED TYPE: ICD-10-CM

## 2024-11-05 DIAGNOSIS — N18.31 CHRONIC KIDNEY DISEASE, STAGE 3A (MULTI): ICD-10-CM

## 2024-11-05 DIAGNOSIS — D64.9 ANEMIA, UNSPECIFIED TYPE: ICD-10-CM

## 2024-11-05 DIAGNOSIS — I83.819 VARICOSE VEINS WITH PAIN: ICD-10-CM

## 2024-11-05 DIAGNOSIS — Z00.00 MEDICARE ANNUAL WELLNESS VISIT, SUBSEQUENT: Primary | ICD-10-CM

## 2024-11-05 DIAGNOSIS — I10 BENIGN ESSENTIAL HYPERTENSION: ICD-10-CM

## 2024-11-05 DIAGNOSIS — R53.83 FATIGUE, UNSPECIFIED TYPE: ICD-10-CM

## 2024-11-05 DIAGNOSIS — E27.1 ADDISON'S DISEASE (MULTI): ICD-10-CM

## 2024-11-05 PROBLEM — J42 CHRONIC BRONCHITIS, UNSPECIFIED CHRONIC BRONCHITIS TYPE (MULTI): Status: ACTIVE | Noted: 2024-11-05

## 2024-11-05 LAB
APPEARANCE UR: CLEAR
BILIRUB UR STRIP.AUTO-MCNC: NEGATIVE MG/DL
COLOR UR: NORMAL
GLUCOSE UR STRIP.AUTO-MCNC: NORMAL MG/DL
KETONES UR STRIP.AUTO-MCNC: NEGATIVE MG/DL
LEUKOCYTE ESTERASE UR QL STRIP.AUTO: NEGATIVE
NITRITE UR QL STRIP.AUTO: NEGATIVE
PH UR STRIP.AUTO: 6 [PH]
POC APPEARANCE, URINE: CLEAR
POC BILIRUBIN, URINE: NEGATIVE
POC BLOOD, URINE: NEGATIVE
POC COLOR, URINE: YELLOW
POC GLUCOSE, URINE: NEGATIVE MG/DL
POC KETONES, URINE: NEGATIVE MG/DL
POC LEUKOCYTES, URINE: NEGATIVE
POC NITRITE,URINE: NEGATIVE
POC PH, URINE: 6 PH
POC PROTEIN, URINE: ABNORMAL MG/DL
POC SPECIFIC GRAVITY, URINE: 1.01
POC UROBILINOGEN, URINE: 0.2 EU/DL
PROT UR STRIP.AUTO-MCNC: NEGATIVE MG/DL
RBC # UR STRIP.AUTO: NEGATIVE /UL
SP GR UR STRIP.AUTO: 1.02
UROBILINOGEN UR STRIP.AUTO-MCNC: NORMAL MG/DL

## 2024-11-05 PROCEDURE — G2211 COMPLEX E/M VISIT ADD ON: HCPCS | Performed by: FAMILY MEDICINE

## 2024-11-05 PROCEDURE — G0444 DEPRESSION SCREEN ANNUAL: HCPCS | Performed by: FAMILY MEDICINE

## 2024-11-05 PROCEDURE — 3075F SYST BP GE 130 - 139MM HG: CPT | Performed by: FAMILY MEDICINE

## 2024-11-05 PROCEDURE — G0439 PPPS, SUBSEQ VISIT: HCPCS | Performed by: FAMILY MEDICINE

## 2024-11-05 PROCEDURE — 3078F DIAST BP <80 MM HG: CPT | Performed by: FAMILY MEDICINE

## 2024-11-05 PROCEDURE — 3288F FALL RISK ASSESSMENT DOCD: CPT | Performed by: FAMILY MEDICINE

## 2024-11-05 PROCEDURE — 1123F ACP DISCUSS/DSCN MKR DOCD: CPT | Performed by: FAMILY MEDICINE

## 2024-11-05 PROCEDURE — 87086 URINE CULTURE/COLONY COUNT: CPT

## 2024-11-05 PROCEDURE — 81003 URINALYSIS AUTO W/O SCOPE: CPT

## 2024-11-05 PROCEDURE — 1170F FXNL STATUS ASSESSED: CPT | Performed by: FAMILY MEDICINE

## 2024-11-05 PROCEDURE — 99214 OFFICE O/P EST MOD 30 MIN: CPT | Performed by: FAMILY MEDICINE

## 2024-11-05 PROCEDURE — 1159F MED LIST DOCD IN RCRD: CPT | Performed by: FAMILY MEDICINE

## 2024-11-05 PROCEDURE — 1160F RVW MEDS BY RX/DR IN RCRD: CPT | Performed by: FAMILY MEDICINE

## 2024-11-05 PROCEDURE — 1157F ADVNC CARE PLAN IN RCRD: CPT | Performed by: FAMILY MEDICINE

## 2024-11-05 PROCEDURE — 81002 URINALYSIS NONAUTO W/O SCOPE: CPT | Performed by: FAMILY MEDICINE

## 2024-11-05 RX ORDER — CLOPIDOGREL BISULFATE 75 MG/1
75 TABLET ORAL DAILY
Qty: 90 TABLET | Refills: 1 | Status: SHIPPED | OUTPATIENT
Start: 2024-11-05

## 2024-11-05 RX ORDER — ATORVASTATIN CALCIUM 20 MG/1
20 TABLET, FILM COATED ORAL NIGHTLY
Qty: 90 TABLET | Refills: 3 | Status: SHIPPED | OUTPATIENT
Start: 2024-11-05 | End: 2025-11-05

## 2024-11-05 RX ORDER — HYDROCHLOROTHIAZIDE 25 MG/1
25 TABLET ORAL DAILY
Qty: 90 TABLET | Refills: 3 | Status: SHIPPED | OUTPATIENT
Start: 2024-11-05 | End: 2025-11-05

## 2024-11-05 ASSESSMENT — ACTIVITIES OF DAILY LIVING (ADL)
TAKING_MEDICATION: INDEPENDENT
MANAGING_FINANCES: INDEPENDENT
DOING_HOUSEWORK: INDEPENDENT
DRESSING: INDEPENDENT
GROCERY_SHOPPING: INDEPENDENT
BATHING: INDEPENDENT

## 2024-11-05 ASSESSMENT — ENCOUNTER SYMPTOMS: DEPRESSION: 0

## 2024-11-05 NOTE — PROGRESS NOTES
Subjective :  Chief Complaint: Daysi Resendiz is an 74 y.o. female here for an annual wellness visit and general medical care and f/u.     HPI:  Here for an annual wellness visit.  Medications:  no concerns  Diet:  on steroids, craves sugar now.  Limits it during the day.  Discussed healthy guidelines.  Limits soda.  Exercise:  has physical therapy exercises that she received for doing at home.  Doing this at home.    Sleep:  sleeps well, about 8-10 hours.    Mood:  variable,  has a good support network, no depression, no suicidal ideation, Depression screening completed using the PHQ-2 screening tool.  See rooming flow sheet for documentation.  5 minutes spent.  Ophthalmology:  up to date, early glaucoma in left eye.  Sees Dr. Momin once a year.  Dental:  up to date.  Appointments scheduled for December.   Advanced directives:  has ACP paperwork.  Falls:  has had 1-2 falls this past year.  [facility]  Has been seen by PT.    Sees Dr. Zimmer for endocrinology.  Will see nephrology again.  Had been scheduled to see a vein doctor for her varicose veins, but did not get to a specialist. Has episodes of painful veins at times.  Would like another referral for a vein consultation.    REVIEW OF SYSTEMS:  Constitutional:  No fever nor chills.  No significant weight changes.  Gets tired lately.   Eyes: No vision changes.  Up to date with her appointments. Sclerae clear.  ENT:  No hearing changes.  Was told her hearing is poor but she does not feel she is having a hard time with this.  Plans to get her hearing rechecked next year.  No nasal congestion.    Cardiovascular:  No chest pains, palpitations, or dyspnea on exertion.  Respiratory:  No cough or shortness of breath.  No wheezing.   GI:  No bowel habit changes. No nausea or vomiting.    MS:  No muscle or joint pains.  Lymphatics:  No swelling.  CNS:  No weakness.  No numbness nor tingling.  No gait change.  Psychiatric: No depression, no  anxiety.  Mood is positive and appropriate.  : sees Dr. Dick and has been avoiding dialysis with his help.  Has a pending apt with him.  Feels like she may be getting a UTI.  No dysuria but she has a malodorous urine.          Review of Systems  All systems reviewed and negative except for what was mentioned in the HPI    Objective   /60 (BP Location: Left arm, Patient Position: Sitting, BP Cuff Size: Adult)   Pulse 74   Temp 35.7 °C (96.3 °F) (Temporal)   Wt 90.7 kg (200 lb)   BMI 30.41 kg/m²     Physical Exam  General:  Alert, oriented, no acute distress  Eyes:  Sclerae white, PER, conjunctivae clear  ENT:  No nasal congestion.    Neck: Supple  Respiratory:  Normal breath sounds.  No wheezing, rhonchi nor crackles.  No dyspnea.  Cardiovascular:  S1 and S2 positive.  Regular rate and rhythm.  No gallops.  No murmurs.  Vascular:  No edema.  Skin warm and dry. Varicose veins bilateral lower extremities.  Superficial spider veins present.  CNS:  No gross neurological deficits.  Gait within normal limits.    Psychiatric:  Affect is positive and appropriate.  No depression.  No anxiety.    Imaging:  No results found.     Labs reviewed:    Lab Results   Component Value Date    WBC 7.0 08/26/2024    RBC 3.52 (L) 08/26/2024    HGB 11.0 (L) 08/26/2024    HCT 32.3 (L) 08/26/2024    MCV 92 08/26/2024     08/26/2024    CHOL 153 05/10/2022    TRIG 113 05/10/2022    HDL 49.0 05/10/2022    ALT 7 08/25/2024    AST 14 08/25/2024     08/28/2024    K 3.8 08/28/2024     08/28/2024    CREATININE 0.91 08/28/2024    BUN 19 08/28/2024    CO2 26 08/28/2024    TSH 2.90 02/08/2024    INR 1.0 06/24/2020    HGBA1C 5.5 06/08/2021       Past Medical, Surgical, and Family History reviewed and updated in chart.    I have reviewed and reconciled the medication list with the patient today.   Current Outpatient Medications:     acetaminophen (Tylenol 8 HOUR) 650 mg ER tablet, Take 2 tablets (1,300 mg) by mouth once  "daily. Do not crush, chew, or split.  Take in the morning., Disp: , Rfl:     BD SafetyGlide Needle 25 gauge x 1\" needle, USE WITH SOLUCORTEF FOR ADRENAL CRISIS., Disp: , Rfl:     cyanocobalamin, vitamin B-12, (VITAMIN B-12 INJ), Inject as directed if needed. EVERY 3-6 MONTHS WHEN NEEDED BASED OFF LAB WORK, Disp: , Rfl:     diphenhydrAMINE-acetaminophen (Tylenol PM)  mg per tablet, Take 1 tablet by mouth as needed at bedtime for sleep., Disp: , Rfl:     hydrocortisone (Cortef) 10 mg tablet, Take 1.5 tablets (15 mg) by mouth once daily in the morning.  TAKE ONE AND A HALF TABLETS BY MOUTH IN THE MORNING THEN TAKE HALF A TABLET AT 3PM THEN TAKE HALF A TABLET AT BEDTIME, Disp: 270 tablet, Rfl: 1    methocarbamol (Robaxin) 500 mg tablet, Take 1 tablet (500 mg) by mouth 4 times a day as needed for muscle spasms., Disp: 20 tablet, Rfl: 1    metoprolol succinate XL (Toprol-XL) 25 mg 24 hr tablet, Take 2 tablets (50 mg) by mouth once daily., Disp: 90 tablet, Rfl: 3    Solu-CORTEF Act-O-Vial, PF, 100 mg/2 mL injection, if needed. FOR EMERGENCY, Disp: , Rfl:     atorvastatin (Lipitor) 20 mg tablet, Take 1 tablet (20 mg) by mouth once daily at bedtime., Disp: 90 tablet, Rfl: 3    clopidogrel (Plavix) 75 mg tablet, Take 1 tablet (75 mg) by mouth once daily., Disp: 90 tablet, Rfl: 1    hydroCHLOROthiazide (HYDRODiuril) 25 mg tablet, Take 1 tablet (25 mg) by mouth once daily., Disp: 90 tablet, Rfl: 3     List of current healthcare providers:  Patient Care Team:  Apple Tello MD as PCP - General  Apple Tello MD as PCP - [facility] PCP  Kobe West MD as Vascular (Vascular Medicine)  Delgado Partida MD as Cardiologist (Cardiology)  Darcy Miller LPN as Care Manager (Case Management)     Assessment/Plan :  Problem List Items Addressed This Visit       Abnormal urine odor     Will check UA and Culture.         Relevant Orders    Urine Culture (Completed)    " Urinalysis with Reflex Microscopic (Completed)    POCT UA (nonautomated) manually resulted (Completed)    Charleston's disease (Multi)     Followed by Endocrinology.         Anemia     Likely secondary to CKD and health issues.           Relevant Orders    Vitamin B12    CBC and Auto Differential    Iron and TIBC    Benign essential hypertension     Controlled.  Continue present management.         Relevant Medications    clopidogrel (Plavix) 75 mg tablet    hydroCHLOROthiazide (HYDRODiuril) 25 mg tablet    Other Relevant Orders    CBC and Auto Differential    Chronic kidney disease, stage 3a (Multi)     Continue with nephrology.         Fatigue     Will check labs.         Relevant Orders    CBC and Auto Differential    History of UTI     Will check UA, C+S.         Relevant Orders    Urine Culture (Completed)    Urinalysis with Reflex Microscopic (Completed)    POCT UA (nonautomated) manually resulted (Completed)    Medicare annual wellness visit, subsequent - Primary     Age appropriate preventive measures reviewed and discussed.  Diet and exercise recommendations discussed.             Varicose veins with pain     New referral on chart.         Relevant Orders    Referral to Vascular Medicine     Other Visit Diagnoses       Routine general medical examination at health care facility        Relevant Orders    1 Year Follow Up In Primary Care - Wellness Exam    Hypertension, unspecified type        Relevant Medications    atorvastatin (Lipitor) 20 mg tablet          The following health maintenance schedule was reviewed with the patient and provided in printed form in the after visit summary:  Health Maintenance   Topic Date Due    Hepatitis C Screening  Never done    DTaP/Tdap/Td Vaccines (1 - Tdap) Never done    Zoster Vaccines (1 of 2) Never done    Diabetes Screening  06/08/2022    RSV High Risk: (Elderly (60+) or Pregnant Population) (1 - 1-dose 75+ series) Never done    Influenza Vaccine (1) Never done     COVID-19 Vaccine (3 - 2024-25 season) 09/01/2024    Medicare Annual Wellness Visit (AWV)  11/18/2024    Pneumococcal Vaccine: 65+ Years (1 of 2 - PCV) 11/17/2024 (Originally 2/7/1956)    CKD: Urine Protein Screening  03/28/2025    Lipid Panel  05/10/2027    HIB Vaccines  Aged Out    Hepatitis B Vaccines  Aged Out    IPV Vaccines  Aged Out    Hepatitis A Vaccines  Aged Out    Meningococcal Vaccine  Aged Out    Rotavirus Vaccines  Aged Out    HPV Vaccines  Aged Out    Mammogram  Discontinued    Bone Density Scan  Discontinued    Colorectal Cancer Screening  Discontinued    Irritable Bowel Syndrome  Discontinued       Advance Care Planning   As above.        Reviewed lab/testing results with the patient and provided patient education regarding the results.  Continue current medications as listed  Follow up in one year for next wellness visit.

## 2024-11-07 LAB — BACTERIA UR CULT: NO GROWTH

## 2024-11-18 ENCOUNTER — LAB (OUTPATIENT)
Dept: LAB | Facility: LAB | Age: 74
End: 2024-11-18
Payer: COMMERCIAL

## 2024-11-18 DIAGNOSIS — E55.9 VITAMIN D DEFICIENCY, UNSPECIFIED: ICD-10-CM

## 2024-11-18 DIAGNOSIS — I12.9 HYPERTENSIVE CHRONIC KIDNEY DISEASE WITH STAGE 1 THROUGH STAGE 4 CHRONIC KIDNEY DISEASE, OR UNSPECIFIED CHRONIC KIDNEY DISEASE: ICD-10-CM

## 2024-11-18 DIAGNOSIS — D64.9 ANEMIA, UNSPECIFIED TYPE: ICD-10-CM

## 2024-11-18 DIAGNOSIS — N18.32 CHRONIC KIDNEY DISEASE, STAGE 3B (MULTI): Primary | ICD-10-CM

## 2024-11-18 LAB
25(OH)D3 SERPL-MCNC: 33 NG/ML (ref 30–100)
ALBUMIN SERPL BCP-MCNC: 4.5 G/DL (ref 3.4–5)
ANION GAP SERPL CALC-SCNC: 11 MMOL/L (ref 10–20)
BUN SERPL-MCNC: 22 MG/DL (ref 6–23)
CALCIUM SERPL-MCNC: 10 MG/DL (ref 8.6–10.3)
CHLORIDE SERPL-SCNC: 101 MMOL/L (ref 98–107)
CO2 SERPL-SCNC: 31 MMOL/L (ref 21–32)
CREAT SERPL-MCNC: 1.16 MG/DL (ref 0.5–1.05)
CREAT UR-MCNC: 94.3 MG/DL (ref 20–320)
EGFRCR SERPLBLD CKD-EPI 2021: 49 ML/MIN/1.73M*2
ERYTHROCYTE [DISTWIDTH] IN BLOOD BY AUTOMATED COUNT: 13.6 % (ref 11.5–14.5)
GLUCOSE SERPL-MCNC: 122 MG/DL (ref 74–99)
HCT VFR BLD AUTO: 41.7 % (ref 36–46)
HGB BLD-MCNC: 13.7 G/DL (ref 12–16)
IRON SATN MFR SERPL: 22 % (ref 25–45)
IRON SERPL-MCNC: 84 UG/DL (ref 35–150)
MCH RBC QN AUTO: 30.3 PG (ref 26–34)
MCHC RBC AUTO-ENTMCNC: 32.9 G/DL (ref 32–36)
MCV RBC AUTO: 92 FL (ref 80–100)
MICROALBUMIN UR-MCNC: 14.6 MG/L
MICROALBUMIN/CREAT UR: 15.5 UG/MG CREAT
NRBC BLD-RTO: 0 /100 WBCS (ref 0–0)
PHOSPHATE SERPL-MCNC: 3.8 MG/DL (ref 2.5–4.9)
PLATELET # BLD AUTO: 300 X10*3/UL (ref 150–450)
POTASSIUM SERPL-SCNC: 4.3 MMOL/L (ref 3.5–5.3)
RBC # BLD AUTO: 4.52 X10*6/UL (ref 4–5.2)
SODIUM SERPL-SCNC: 139 MMOL/L (ref 136–145)
TIBC SERPL-MCNC: 381 UG/DL (ref 240–445)
TSH SERPL-ACNC: 0.85 MIU/L (ref 0.44–3.98)
UIBC SERPL-MCNC: 297 UG/DL (ref 110–370)
VIT B12 SERPL-MCNC: 198 PG/ML (ref 211–911)
WBC # BLD AUTO: 9 X10*3/UL (ref 4.4–11.3)

## 2024-11-18 PROCEDURE — 80069 RENAL FUNCTION PANEL: CPT

## 2024-11-18 PROCEDURE — 82570 ASSAY OF URINE CREATININE: CPT

## 2024-11-18 PROCEDURE — 84443 ASSAY THYROID STIM HORMONE: CPT

## 2024-11-18 PROCEDURE — 83550 IRON BINDING TEST: CPT

## 2024-11-18 PROCEDURE — 82306 VITAMIN D 25 HYDROXY: CPT

## 2024-11-18 PROCEDURE — 85027 COMPLETE CBC AUTOMATED: CPT

## 2024-11-18 PROCEDURE — 82607 VITAMIN B-12: CPT

## 2024-11-18 PROCEDURE — 82043 UR ALBUMIN QUANTITATIVE: CPT

## 2024-11-18 PROCEDURE — 83540 ASSAY OF IRON: CPT

## 2024-11-18 PROCEDURE — 36415 COLL VENOUS BLD VENIPUNCTURE: CPT

## 2024-11-18 PROCEDURE — 83970 ASSAY OF PARATHORMONE: CPT

## 2024-11-19 LAB — PTH-INTACT SERPL-MCNC: 107.3 PG/ML (ref 18.5–88)

## 2024-11-27 ENCOUNTER — PATIENT OUTREACH (OUTPATIENT)
Dept: PRIMARY CARE | Facility: CLINIC | Age: 74
End: 2024-11-27
Payer: COMMERCIAL

## 2024-12-12 DIAGNOSIS — R30.0 DYSURIA: ICD-10-CM

## 2024-12-27 DIAGNOSIS — I10 BENIGN ESSENTIAL HYPERTENSION: ICD-10-CM

## 2024-12-27 DIAGNOSIS — E27.1 ADDISON'S DISEASE (MULTI): ICD-10-CM

## 2024-12-30 DIAGNOSIS — E27.1 ADDISON'S DISEASE (MULTI): ICD-10-CM

## 2024-12-30 RX ORDER — HYDROCORTISONE 10 MG/1
15 TABLET ORAL EVERY MORNING
Qty: 270 TABLET | Refills: 1 | Status: SHIPPED | OUTPATIENT
Start: 2024-12-30 | End: 2024-12-30

## 2024-12-30 RX ORDER — HYDROCORTISONE 10 MG/1
15 TABLET ORAL EVERY MORNING
Qty: 270 TABLET | Refills: 1 | Status: SHIPPED | OUTPATIENT
Start: 2024-12-30

## 2024-12-30 RX ORDER — METOPROLOL SUCCINATE 25 MG/1
25 TABLET, EXTENDED RELEASE ORAL DAILY
Qty: 90 TABLET | Refills: 1 | Status: SHIPPED | OUTPATIENT
Start: 2024-12-30

## 2025-01-15 ENCOUNTER — OFFICE VISIT (OUTPATIENT)
Dept: PRIMARY CARE | Facility: CLINIC | Age: 75
End: 2025-01-15
Payer: MEDICARE

## 2025-01-15 ENCOUNTER — APPOINTMENT (OUTPATIENT)
Dept: PRIMARY CARE | Facility: CLINIC | Age: 75
End: 2025-01-15
Payer: COMMERCIAL

## 2025-01-15 VITALS
HEART RATE: 68 BPM | WEIGHT: 195 LBS | DIASTOLIC BLOOD PRESSURE: 65 MMHG | SYSTOLIC BLOOD PRESSURE: 120 MMHG | BODY MASS INDEX: 29.55 KG/M2 | HEIGHT: 68 IN

## 2025-01-15 DIAGNOSIS — I83.819 VARICOSE VEINS WITH PAIN: ICD-10-CM

## 2025-01-15 DIAGNOSIS — I87.8 VENOUS STASIS OF BOTH LOWER EXTREMITIES: Primary | ICD-10-CM

## 2025-01-15 DIAGNOSIS — I83.893 VARICOSE VEINS OF BOTH LEGS WITH EDEMA: ICD-10-CM

## 2025-01-15 DIAGNOSIS — I87.393 CHRONIC VENOUS HYPERTENSION WITH COMPLICATION INVOLVING BOTH SIDES: ICD-10-CM

## 2025-01-15 PROCEDURE — 99214 OFFICE O/P EST MOD 30 MIN: CPT | Performed by: INTERNAL MEDICINE

## 2025-01-15 PROCEDURE — 1123F ACP DISCUSS/DSCN MKR DOCD: CPT | Performed by: INTERNAL MEDICINE

## 2025-01-15 PROCEDURE — 1160F RVW MEDS BY RX/DR IN RCRD: CPT | Performed by: INTERNAL MEDICINE

## 2025-01-15 PROCEDURE — 3074F SYST BP LT 130 MM HG: CPT | Performed by: INTERNAL MEDICINE

## 2025-01-15 PROCEDURE — 1159F MED LIST DOCD IN RCRD: CPT | Performed by: INTERNAL MEDICINE

## 2025-01-15 PROCEDURE — 1157F ADVNC CARE PLAN IN RCRD: CPT | Performed by: INTERNAL MEDICINE

## 2025-01-15 PROCEDURE — 3078F DIAST BP <80 MM HG: CPT | Performed by: INTERNAL MEDICINE

## 2025-01-15 RX ORDER — SERTRALINE HYDROCHLORIDE 50 MG/1
0.5 TABLET, FILM COATED ORAL
COMMUNITY
Start: 2024-12-03

## 2025-02-10 ENCOUNTER — LAB (OUTPATIENT)
Dept: LAB | Facility: HOSPITAL | Age: 75
End: 2025-02-10
Payer: MEDICARE

## 2025-02-10 DIAGNOSIS — R30.0 DYSURIA: ICD-10-CM

## 2025-02-10 PROCEDURE — 88112 CYTOPATH CELL ENHANCE TECH: CPT

## 2025-02-12 LAB
APPEARANCE UR: ABNORMAL
BACTERIA #/AREA URNS HPF: ABNORMAL /HPF
BACTERIA UR CULT: NORMAL
BILIRUB UR QL STRIP: NEGATIVE
COLOR UR: YELLOW
GLUCOSE UR QL STRIP: NEGATIVE
HGB UR QL STRIP: NEGATIVE
HYALINE CASTS #/AREA URNS LPF: ABNORMAL /LPF
KETONES UR QL STRIP: NEGATIVE
LABORATORY COMMENT REPORT: NORMAL
LABORATORY COMMENT REPORT: NORMAL
LEUKOCYTE ESTERASE UR QL STRIP: ABNORMAL
NITRITE UR QL STRIP: NEGATIVE
PATH REPORT.FINAL DX SPEC: NORMAL
PATH REPORT.GROSS SPEC: NORMAL
PATH REPORT.RELEVANT HX SPEC: NORMAL
PATH REPORT.TOTAL CANCER: NORMAL
PH UR STRIP: 5.5 [PH] (ref 5–8)
PROT UR QL STRIP: NEGATIVE
RBC #/AREA URNS HPF: ABNORMAL /HPF
SERVICE CMNT-IMP: ABNORMAL
SP GR UR STRIP: 1.02 (ref 1–1.03)
SQUAMOUS #/AREA URNS HPF: ABNORMAL /HPF
WBC #/AREA URNS HPF: ABNORMAL /HPF

## 2025-02-25 ENCOUNTER — PATIENT MESSAGE (OUTPATIENT)
Dept: PRIMARY CARE | Facility: CLINIC | Age: 75
End: 2025-02-25
Payer: COMMERCIAL

## 2025-02-25 DIAGNOSIS — I10 BENIGN ESSENTIAL HYPERTENSION: ICD-10-CM

## 2025-02-25 RX ORDER — HYDROCHLOROTHIAZIDE 12.5 MG/1
12.5 TABLET ORAL 2 TIMES DAILY
Qty: 180 TABLET | Refills: 1 | OUTPATIENT
Start: 2025-02-25

## 2025-02-25 NOTE — TELEPHONE ENCOUNTER
Rx request  Last OV 11/2024  Pending OV none      Patient is in need of a POV for medication refills

## 2025-02-25 NOTE — TELEPHONE ENCOUNTER
Patient clarification sent to provider. Patient states she is taking hydrochlorothiazide 25mg in the morning

## 2025-02-26 RX ORDER — HYDROCHLOROTHIAZIDE 25 MG/1
25 TABLET ORAL DAILY
Qty: 90 TABLET | Refills: 3 | Status: SHIPPED | OUTPATIENT
Start: 2025-02-26 | End: 2026-02-26

## 2025-03-03 ENCOUNTER — APPOINTMENT (OUTPATIENT)
Dept: UROLOGY | Facility: CLINIC | Age: 75
End: 2025-03-03
Payer: COMMERCIAL

## 2025-03-03 VITALS
SYSTOLIC BLOOD PRESSURE: 143 MMHG | HEIGHT: 68 IN | RESPIRATION RATE: 18 BRPM | HEART RATE: 84 BPM | DIASTOLIC BLOOD PRESSURE: 76 MMHG | BODY MASS INDEX: 27.13 KG/M2 | WEIGHT: 179 LBS

## 2025-03-03 DIAGNOSIS — N39.0 LOWER URINARY TRACT INFECTIOUS DISEASE: ICD-10-CM

## 2025-03-03 DIAGNOSIS — N28.1 CYST OF RIGHT KIDNEY: Primary | ICD-10-CM

## 2025-03-03 DIAGNOSIS — N28.1 RENAL CYSTS, ACQUIRED, BILATERAL: ICD-10-CM

## 2025-03-03 PROCEDURE — 1160F RVW MEDS BY RX/DR IN RCRD: CPT | Performed by: UROLOGY

## 2025-03-03 PROCEDURE — 3077F SYST BP >= 140 MM HG: CPT | Performed by: UROLOGY

## 2025-03-03 PROCEDURE — 1157F ADVNC CARE PLAN IN RCRD: CPT | Performed by: UROLOGY

## 2025-03-03 PROCEDURE — 1036F TOBACCO NON-USER: CPT | Performed by: UROLOGY

## 2025-03-03 PROCEDURE — 3078F DIAST BP <80 MM HG: CPT | Performed by: UROLOGY

## 2025-03-03 PROCEDURE — 1159F MED LIST DOCD IN RCRD: CPT | Performed by: UROLOGY

## 2025-03-03 PROCEDURE — 1123F ACP DISCUSS/DSCN MKR DOCD: CPT | Performed by: UROLOGY

## 2025-03-03 PROCEDURE — 99203 OFFICE O/P NEW LOW 30 MIN: CPT | Performed by: UROLOGY

## 2025-03-03 ASSESSMENT — ENCOUNTER SYMPTOMS
OCCASIONAL FEELINGS OF UNSTEADINESS: 0
DEPRESSION: 0
LOSS OF SENSATION IN FEET: 0

## 2025-03-03 NOTE — PROGRESS NOTES
"75-year-old female referred by Dr. Raven Dick for \"kidney cyst\".  CKD stage III.  MRI of the abdomen in May 2024 identified 5.5 cm right renal cyst, Bosniak 2.  Creatinine 1.16.  Patient is presently on Plavix.  Patient is retired from a [employer] center.  No family history of breast or prostate cancer.  The patient has never smoked cigarettes.    PLAVIX    March 3, 2025, patient arrives alone.  She has a complicated medical history.  Multiple UTIs over many years originally saw Dr. Klaus Hess from [facility] infectious disease.  Has not seen him for several years.  She also underwent 4 separate bladder lifts under the care of Dr. Marcos Guardado.  She had a hysterectomy prior.  She has seen Dr. Tony Jean Baptiste and had a cystoscopy approximately 2 years ago.  She states that she was told she had \"too openings\" where the urine emanates.  Possibly, a fistula but she does not have incontinence.  She states that catheterization is extremely painful.  I suspect she may have a urethral stricture.  In any case she will repeat her urinalysis and urine culture now as they were mixed and with bacteria.  Urine cytology was also negative for malignancy.  We will also obtain another renal ultrasound as it has been approximately 1 year.  She will return for cystoscopy, flow studies and a discussion of treatment options.  She may benefit from an alpha agent and she is very strongly would like a maintenance antibiotic either in the form of daily or multiple times a week.  We do not have a positive culture on her at this time.  "

## 2025-03-03 NOTE — PROGRESS NOTES
Pt denies any pain today. States has not had any recent hospital admits or surgeries since their last visit. Denies any concerns about falling or safety. Patient states she has concerns regarding recurrent uti's , history of prolapse surgery with Dr. Raymundo. MG

## 2025-03-03 NOTE — LETTER
"March 3, 2025     No Recipients    Patient: Daysi Resendiz   YOB: 1950   Date of Visit: 3/3/2025       Dear Dr. Cao Recipients:    Thank you for referring Daysi Resendiz to me for evaluation. Below are my notes for this consultation.  If you have questions, please do not hesitate to call me. I look forward to following your patient along with you.       Sincerely,     Kirk Romero MD      CC: No Recipients  ______________________________________________________________________________________    75-year-old female referred by Dr. Raven Dick for \"kidney cyst\".  CKD stage III.  MRI of the abdomen in May 2024 identified 5.5 cm right renal cyst, Bosniak 2.  Creatinine 1.16.  Patient is presently on Plavix.  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  No family history of breast or prostate cancer.  The patient has never smoked cigarettes.    PLAVIX    March 3, 2025, patient arrives alone.  She has a complicated medical history.  Multiple UTIs over many years originally saw Dr. Klaus Hess from [facility] infectious disease.  Has not seen him for several years.  She also underwent 4 separate bladder lifts under the care of Dr. Marcos Guardado.  She had a hysterectomy prior.  She has seen Dr. Tony Jean Baptiste and had a cystoscopy approximately 2 years ago.  She states that she was told she had \"too openings\" where the urine emanates.  Possibly, a fistula but she does not have incontinence.  She states that catheterization is extremely painful.  I suspect she may have a urethral stricture.  In any case she will repeat her urinalysis and urine culture now as they were mixed and with bacteria.  Urine cytology was also negative for malignancy.  We will also obtain another renal ultrasound as it has been approximately 1 year.  She will return for cystoscopy, flow studies and a discussion of treatment options.  She may benefit from an alpha agent and she is very strongly would like a maintenance antibiotic " either in the form of daily or multiple times a week.  We do not have a positive culture on her at this time.

## 2025-03-05 ENCOUNTER — APPOINTMENT (OUTPATIENT)
Dept: PRIMARY CARE | Facility: CLINIC | Age: 75
End: 2025-03-05
Payer: COMMERCIAL

## 2025-03-05 ENCOUNTER — HOSPITAL ENCOUNTER (OUTPATIENT)
Dept: RADIOLOGY | Facility: HOSPITAL | Age: 75
Discharge: HOME | End: 2025-03-05
Payer: MEDICARE

## 2025-03-05 DIAGNOSIS — N28.1 RENAL CYSTS, ACQUIRED, BILATERAL: ICD-10-CM

## 2025-03-05 DIAGNOSIS — I87.393 CHRONIC VENOUS HYPERTENSION WITH COMPLICATION INVOLVING BOTH SIDES: ICD-10-CM

## 2025-03-05 PROCEDURE — 1157F ADVNC CARE PLAN IN RCRD: CPT | Performed by: INTERNAL MEDICINE

## 2025-03-05 PROCEDURE — 1123F ACP DISCUSS/DSCN MKR DOCD: CPT | Performed by: INTERNAL MEDICINE

## 2025-03-05 PROCEDURE — 93970 EXTREMITY STUDY: CPT | Performed by: INTERNAL MEDICINE

## 2025-03-05 PROCEDURE — 76770 US EXAM ABDO BACK WALL COMP: CPT

## 2025-03-05 NOTE — PROGRESS NOTES
Venous Duplex      Indications:  Limb pain  Leg Edema    Sonographer: Windy Goodwin RVT    TECHNIQUE:  Venous Duplex ultrasound spectral Doppler wave modality was performed with the patient in the supine and upright positions to determine the status of the deep and superficial systems of the right and left lower extremity. The common femoral, femoral and popliteal veins of the deep venous system were imaged. The superficial system was imaged entirely to include, but was not limited to, the saphenous-femoral junction (SFJ) down the greater saphenous vein from the groin to the ankle, and the saphenous-popliteal junction (SPJ), if present, at the popliteal fossa down the small saphenous vein (SSV) to the ankle. As indicated, the cranial extensions of the SSV (CESSV), the anterior accessory GSV (AAGSV), and the posterior accessory GSV (PAGSV) were also evaluated, if present. All areas meeting the criteria for venous reflux (500 milliseconds or greater in the saphenous veins and principle branches, 350 milliseconds in perforators and 1000 milliseconds in the deep system) were confirmed with spectral Doppler analysis and confirmatory images were documented:     FINDINGS ARE THE FOLLOWING:    RIGHT LEG:  There is no evidence of thrombus in the interrogated segments of the deep or superficial venous system. There is no evidence of deep venous reflux.    GSV dimensions: 2.2mm junction  proximal segment is not visualized   mid segment is not visualized  distal segment is not visualized  The Greater Saphenous Vein appears to be absent, consistent with previous stripping per the patient.    SSV dimensions: 4.0mm junction  2.6mm mid  There is >0.5 seconds of reflux in the mid to distal Small Saphenous Vein    There are multiple incompetent tributaries along the thigh and lower leg.    Trib1 dimensions: 3.8mm  Trib2 dimensions: 3.3mm  Trib3 dimensions: 3.8mm  There is >0.5 seconds of reflux in the tributaries     There is an  incompetent  noted in the upper lateral thigh measuring 2.7mm.     LEFT LEG:  There is no evidence of thrombus in the interrogated segments of the deep or superficial venous system. There is no evidence of deep venous reflux.     GSV dimensions: 3.5mm junction  proximal segment is not visualized   mid segment is not visualized  distal segment is not visualized  The Greater Saphenous Vein appears to be absent, consistent with previous stripping per the patient.    SSV dimensions: 2.6mm junction  2.8mm mid  There is <0.5 seconds of reflux in the Small Saphenous Vein    There are multiple incompetent tributaries along the thigh and lower leg.    Trib1 dimensions: 3.7mm  Trib2 dimensions: 4.0mm  Trib3 dimensions:3.1mm  There is >0.5 seconds of reflux in the tributaries      CONCLUSIONS:  There is no evidence of thrombus in the interrogated segments of the deep or superficial venous system in both legs.  No evidence of deep venous reflux.  Numerous incompetent tributaries are seen in both legs as noted in the MAP measured more than 3 mm in diameter.    Discussions  As per the patient's detailed venous ultrasound evaluation patient would benefit from adjunctive image guided foam sclerotherapy x 2 in each leg.    Incidental finding of bilateral groin oval-shaped hypoechogenic structure with central echogenicity measured right 4.4 x 30.3 mm, left 5.3 x 15.5 mm in diameter identified.  This could be groin lymph nodes but clinical correlation advised.

## 2025-03-08 LAB
APPEARANCE UR: CLEAR
BACTERIA #/AREA URNS HPF: ABNORMAL /HPF
BACTERIA UR CULT: ABNORMAL
BILIRUB UR QL STRIP: NEGATIVE
COLOR UR: YELLOW
GLUCOSE UR QL STRIP: NEGATIVE
HGB UR QL STRIP: NEGATIVE
HYALINE CASTS #/AREA URNS LPF: ABNORMAL /LPF
KETONES UR QL STRIP: NEGATIVE
LEUKOCYTE ESTERASE UR QL STRIP: ABNORMAL
NITRITE UR QL STRIP: NEGATIVE
PH UR STRIP: 5.5 [PH] (ref 5–8)
PROT UR QL STRIP: NEGATIVE
RBC #/AREA URNS HPF: ABNORMAL /HPF
SERVICE CMNT-IMP: ABNORMAL
SP GR UR STRIP: 1.02 (ref 1–1.03)
SQUAMOUS #/AREA URNS HPF: ABNORMAL /HPF
WBC #/AREA URNS HPF: ABNORMAL /HPF

## 2025-03-10 ENCOUNTER — APPOINTMENT (OUTPATIENT)
Dept: UROLOGY | Facility: CLINIC | Age: 75
End: 2025-03-10
Payer: MEDICARE

## 2025-03-10 VITALS
WEIGHT: 174 LBS | DIASTOLIC BLOOD PRESSURE: 76 MMHG | HEIGHT: 68 IN | RESPIRATION RATE: 18 BRPM | BODY MASS INDEX: 26.37 KG/M2 | HEART RATE: 79 BPM | SYSTOLIC BLOOD PRESSURE: 131 MMHG

## 2025-03-10 DIAGNOSIS — N39.0 URINARY TRACT INFECTION WITHOUT HEMATURIA, SITE UNSPECIFIED: ICD-10-CM

## 2025-03-10 DIAGNOSIS — N28.1 RENAL CYSTS, ACQUIRED, BILATERAL: Primary | ICD-10-CM

## 2025-03-10 LAB
POC APPEARANCE, URINE: CLEAR
POC BILIRUBIN, URINE: NEGATIVE
POC BLOOD, URINE: NEGATIVE
POC COLOR, URINE: YELLOW
POC GLUCOSE, URINE: NEGATIVE MG/DL
POC KETONES, URINE: NEGATIVE MG/DL
POC LEUKOCYTES, URINE: ABNORMAL
POC NITRITE,URINE: NEGATIVE
POC PH, URINE: 5.5 PH
POC PROTEIN, URINE: NEGATIVE MG/DL
POC SPECIFIC GRAVITY, URINE: 1.01
POC UROBILINOGEN, URINE: 0.2 EU/DL

## 2025-03-10 PROCEDURE — 51798 US URINE CAPACITY MEASURE: CPT | Performed by: UROLOGY

## 2025-03-10 PROCEDURE — 81003 URINALYSIS AUTO W/O SCOPE: CPT | Performed by: UROLOGY

## 2025-03-10 PROCEDURE — 52000 CYSTOURETHROSCOPY: CPT | Performed by: UROLOGY

## 2025-03-10 PROCEDURE — 99214 OFFICE O/P EST MOD 30 MIN: CPT | Performed by: UROLOGY

## 2025-03-10 RX ORDER — NITROFURANTOIN MACROCRYSTALS 50 MG/1
50 CAPSULE ORAL DAILY
Qty: 90 CAPSULE | Refills: 3 | Status: SHIPPED | OUTPATIENT
Start: 2025-03-10 | End: 2026-03-05

## 2025-03-10 NOTE — LETTER
"March 10, 2025     Apple Tello MD  [address] [facility] [place] [address]    Patient: Daysi Resendiz   YOB: 1950   Date of Visit: 3/10/2025       Dear Dr. Apple Tello MD:    Thank you for referring Daysi Resendiz to me for evaluation. Below are my notes for this consultation.  If you have questions, please do not hesitate to call me. I look forward to following your patient along with you.       Sincerely,     Kirk Romero MD      CC: No Recipients  ______________________________________________________________________________________      75-year-old female referred by Dr. Raven Dick for \"kidney cyst\".  CKD stage III.  MRI of the abdomen in May 2024 identified 5.5 cm right renal cyst, Bosniak 2.  Creatinine 1.16.  Patient is presently on Plavix.  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  No family history of breast or prostate cancer.  The patient has never smoked cigarettes.     PLAVIX     March 3, 2025, patient arrives alone.  She has a complicated medical history.  Multiple UTIs over many years originally saw Dr. Klaus Hess from [facility] infectious disease.  Has not seen him for several years.  She also underwent 4 separate bladder lifts under the care of Dr. Marcos Guardado.  She had a hysterectomy prior.  She has seen Dr. Tony Jean Baptiste and had a cystoscopy approximately 2 years ago.  She states that she was told she had \"two openings\" where the urine emanates.  Possibly, a fistula but she does not have incontinence.  She states that catheterization is extremely painful.  I suspect she may have a urethral stricture.  In any case she will repeat her urinalysis and urine culture now as they were mixed and with bacteria.  Urine cytology was also negative for malignancy.  We will also obtain another renal ultrasound as it has been approximately 1 year.  She will return for cystoscopy, flow studies and a discussion of treatment options.  She may benefit from an " alpha agent and she is very strongly would like a maintenance antibiotic either in the form of daily or multiple times a week.  We do not have a positive culture on her at this time.    March 10, 2025, cystoscopy, flow studies and a discussion of treatment options.  Severe erythema in the lower half of the bladder.  No stones or tumors.  No stricture.  PVR 1 cc.  Renal ultrasound showed the 5.3 cm right renal cyst unchanged.  We will initiate Macrodantin 50 mg daily and have her return in 3 months.    PLAN:    #1 the patient will return in 3 months with a cystoscopy, PVR, urinalysis and urine culture    2.  Macrodantin 50 mg p.o. daily, transition to 3 times a week in February

## 2025-03-10 NOTE — PROGRESS NOTES
"Patient ID: Daysi Resendiz is a 75 y.o. female.    Procedures  Pt took Macrodantin as prescribed  Anesthesia: Local 2% Lidocaine  Instruments: 6F flexible disposable Cystoscope    Pt brought to procedure room and placed in dorsal lithotomy position. Pt draped and prepped in normal sterile fashion. 5ml lidocaine instilled into urethra (vagina). Pt tolerated well.    I was present as chaperone for the entirety of the procedure   Neeta Garza  Cystoscopy performed by Dr. Kirk Romero      Bedside \"Time Out\" Verification   Today's Date:  3/10/2025. I attest that this time out verification took place prior to the procedure.   Procedure: Cysto   RN/LPN/MA:    Provider: [facility].   Verified By: MA, Neeta Garza and Provider, Dr. Kirk Romero.   Prior to the start of the procedure a time out was taken and the following were verified: the identity of the patient using two patient identifiers, the correct procedure, the correct site marked as indicated, the correct positioning for the patient and the correct equipment was obtained.   Cystoscopy - Daysi Resendiz-identified using two (2) forms of identification.   Procedure: diagnostic 10:30am     Time Completed: 11:02 AM  Indications for procedure: Surveillance Cystoscopy due to recurrent uti        Discussed with patient: Risks, benefits, and alternative were discussed in detail. Patient appears to understand and agrees to proceed. Patient has signed the procedure consent form.    CYSTOSCOPY:    Cystoscopy today reveals a normal urethra and bladder neck.  Once inside the bladder, both ureteral orifices were identified.  Full examination of the bladder did not reveal any evidence of stones or tumors.  Erythema is significant in the lower half of the bladder.  PVR 1 cc.  "

## 2025-03-10 NOTE — PROGRESS NOTES
"  75-year-old female referred by Dr. Raven Dick for \"kidney cyst\".  CKD stage III.  MRI of the abdomen in May 2024 identified 5.5 cm right renal cyst, Bosniak 2.  Creatinine 1.16.  Patient is presently on Plavix.  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  No family history of breast or prostate cancer.  The patient has never smoked cigarettes.     PLAVIX     March 3, 2025, patient arrives alone.  She has a complicated medical history.  Multiple UTIs over many years originally saw Dr. Klaus Hess from [facility] infectious disease.  Has not seen him for several years.  She also underwent 4 separate bladder lifts under the care of Dr. Marcos Guardado.  She had a hysterectomy prior.  She has seen Dr. Tony Jean Baptiste and had a cystoscopy approximately 2 years ago.  She states that she was told she had \"two openings\" where the urine emanates.  Possibly, a fistula but she does not have incontinence.  She states that catheterization is extremely painful.  I suspect she may have a urethral stricture.  In any case she will repeat her urinalysis and urine culture now as they were mixed and with bacteria.  Urine cytology was also negative for malignancy.  We will also obtain another renal ultrasound as it has been approximately 1 year.  She will return for cystoscopy, flow studies and a discussion of treatment options.  She may benefit from an alpha agent and she is very strongly would like a maintenance antibiotic either in the form of daily or multiple times a week.  We do not have a positive culture on her at this time.    March 10, 2025, cystoscopy, flow studies and a discussion of treatment options.  Severe erythema in the lower half of the bladder.  No stones or tumors.  No stricture.  PVR 1 cc.  Renal ultrasound showed the 5.3 cm right renal cyst unchanged.  We will initiate Macrodantin 50 mg daily and have her return in 3 months.    PLAN:    #1 the patient will return in 3 months with a cystoscopy, PVR, urinalysis " and urine culture    2.  Macrodantin 50 mg p.o. daily, transition to 3 times a week in February

## 2025-03-10 NOTE — PATIENT INSTRUCTIONS
It was nice to see you once again today.  You underwent a successful cystoscopy, flow studies and a discussion of treatment options.  You are emptying your bladder very well with only a 1 cc residual.  You do not have any stones or tumors.  You do have significant inflammation within the bladder from your recurrent UTIs.  We will initiate Macrodantin on a daily basis and have you return in 3 months for further evaluation.  The ultrasound of the kidneys once again showed a stable cyst in the right kidney measuring 5.3 cm.

## 2025-03-12 ENCOUNTER — APPOINTMENT (OUTPATIENT)
Dept: PRIMARY CARE | Facility: CLINIC | Age: 75
End: 2025-03-12
Payer: COMMERCIAL

## 2025-03-12 VITALS
HEART RATE: 61 BPM | SYSTOLIC BLOOD PRESSURE: 126 MMHG | BODY MASS INDEX: 29.25 KG/M2 | WEIGHT: 193 LBS | HEIGHT: 68 IN | DIASTOLIC BLOOD PRESSURE: 69 MMHG

## 2025-03-12 DIAGNOSIS — I87.8 VENOUS STASIS OF BOTH LOWER EXTREMITIES: Primary | ICD-10-CM

## 2025-03-12 DIAGNOSIS — I87.393 CHRONIC VENOUS HYPERTENSION WITH COMPLICATION INVOLVING BOTH SIDES: ICD-10-CM

## 2025-03-12 DIAGNOSIS — Z88.2 ALLERGY TO SULFA DRUGS: ICD-10-CM

## 2025-03-12 DIAGNOSIS — I83.819 VARICOSE VEINS WITH PAIN: ICD-10-CM

## 2025-03-12 DIAGNOSIS — I83.893 VARICOSE VEINS OF BOTH LEGS WITH EDEMA: ICD-10-CM

## 2025-03-12 PROCEDURE — 1157F ADVNC CARE PLAN IN RCRD: CPT | Performed by: INTERNAL MEDICINE

## 2025-03-12 PROCEDURE — 1160F RVW MEDS BY RX/DR IN RCRD: CPT | Performed by: INTERNAL MEDICINE

## 2025-03-12 PROCEDURE — 99214 OFFICE O/P EST MOD 30 MIN: CPT | Performed by: INTERNAL MEDICINE

## 2025-03-12 PROCEDURE — 1123F ACP DISCUSS/DSCN MKR DOCD: CPT | Performed by: INTERNAL MEDICINE

## 2025-03-12 PROCEDURE — 1036F TOBACCO NON-USER: CPT | Performed by: INTERNAL MEDICINE

## 2025-03-12 PROCEDURE — 1159F MED LIST DOCD IN RCRD: CPT | Performed by: INTERNAL MEDICINE

## 2025-03-12 PROCEDURE — 3074F SYST BP LT 130 MM HG: CPT | Performed by: INTERNAL MEDICINE

## 2025-03-12 PROCEDURE — 3078F DIAST BP <80 MM HG: CPT | Performed by: INTERNAL MEDICINE

## 2025-03-12 RX ORDER — METOPROLOL SUCCINATE 50 MG/1
1 TABLET, EXTENDED RELEASE ORAL
COMMUNITY
Start: 2025-02-17

## 2025-03-12 NOTE — PROGRESS NOTES
Chief Complaints:  Seen for follow-up after detailed ultrasound evaluation.    HPI:  75 years old female who is known to have bilateral varicose veins and have been having symptoms for several years.  She has had previous procedures mainly what she remembers is the vein stripping.  She has a recurrence of the varicose veins with multiple symptoms as noted below.    She has been compliant with wearing the graduated compression stockings.  She is in the room with her .  She feels better with wearing the stockings but the symptoms are still persisting at the end of the day and also if she is not wearing the stockings.    She has history of adrenal insufficiency and she takes the physiological dose of steroids and also she carries the Solu-Cortef with her all the time.    Patient tells me that she was told by the nephrologist that she is safe to have the venous procedures and she would like to have them done.    Patient has multiple allergies including sulfa.    Symptoms:  bulging veins,dilated veins,discolored veins,leg pain,leg swelling and muscle cramps. The patient is currently experiencing symptoms. Symptoms are located on both sides, the left side more than the right. The patient describes the pain as aching, heavy and stinging. Onset was gradual 20 year(s) ago. The symptoms occur intermittently. The episodes occur daily. She describes this as moderate in severity and worsening. Exacerbating factors:  leg dependency, prolonged sitting~and~prolonged standing. Relieving factors:  elevation, exercises and support stockings. Current treatment includes thigh-high graduated compression stockings and leg elevation. By report, there is fair compliance with treatment, fair tolerance of treatment and fair symptom control. Initial diagnosis of varicose veins was More than 30 year(s) ago. Initial presentation included bulging veins and dilated veins. Since diagnosis the disease has been worsening. Recently, the  "disease has been worsening. Disease complications:  chronic edema~and~hyperpigmentation. Pertinent medical history:  no deep venous thrombosis,~no hypercoagulable state,~no pulmonary embolism~and~no thrombophlebitis. Risk factors:  family history of varicose veins~and~obesity. She was previously evaluated by a colleague 20 year(s) ago. Presenting symptoms included bulging veins, dilated veins, leg pain and muscle cramps. Past treatment has included compression stockings, leg elevation, surgical excision and vein stripping.      ROS:  Respiratory:  No shortness of breath.  No cough, sinus congestion     Cardiovascular:     No chest pain.  Denies claudication.  No cyanosis.  Denies palpitations,     Neurologic:     No tingling/Numbness.  No loss of strength.     Social History:  Tobacco Use:  None    Current Outpatient Medications on File Prior to Visit   Medication Sig Dispense Refill    acetaminophen (Tylenol 8 HOUR) 650 mg ER tablet Take 2 tablets (1,300 mg) by mouth once daily. Do not crush, chew, or split.  Take in the morning.      atorvastatin (Lipitor) 20 mg tablet Take 1 tablet (20 mg) by mouth once daily at bedtime. 90 tablet 3    BD SafetyGlide Needle 25 gauge x 1\" needle USE WITH SOLUCORTEF FOR ADRENAL CRISIS.      clopidogrel (Plavix) 75 mg tablet Take 1 tablet (75 mg) by mouth once daily. 90 tablet 1    cyanocobalamin, vitamin B-12, (VITAMIN B-12 INJ) Inject as directed if needed. EVERY 3-6 MONTHS WHEN NEEDED BASED OFF LAB WORK      diphenhydrAMINE-acetaminophen (Tylenol PM)  mg per tablet Take 1 tablet by mouth as needed at bedtime for sleep.      hydroCHLOROthiazide (HYDRODiuril) 25 mg tablet Take 1 tablet (25 mg) by mouth once daily. 90 tablet 3    hydrocortisone (Cortef) 10 mg tablet Take 1.5 tablets (15 mg) by mouth once daily in the morning. TAKE ONE AND A HALF TABLETS BY MOUTH IN THE MORNING THEN TAKE HALF A TABLET AT 3PM THEN TAKE HALF A TABLET AT BEDTIME 270 tablet 1    methocarbamol " "(Robaxin) 500 mg tablet Take 1 tablet (500 mg) by mouth 4 times a day as needed for muscle spasms. 20 tablet 1    metoprolol succinate XL (Toprol-XL) 50 mg 24 hr tablet Take 1 tablet (50 mg) by mouth early in the morning..      nitrofurantoin (Macrodantin) 50 mg capsule Take 1 capsule (50 mg) by mouth once daily. 90 capsule 3    sertraline (Zoloft) 50 mg tablet Take 0.5 tablets (25 mg) by mouth early in the morning..      Solu-CORTEF Act-O-Vial, PF, 100 mg/2 mL injection if needed. FOR EMERGENCY      [DISCONTINUED] metoprolol succinate XL (Toprol-XL) 25 mg 24 hr tablet TAKE 1 TABLET BY MOUTH EVERY DAY 90 tablet 1     No current facility-administered medications on file prior to visit.        Allergies   Allergen Reactions    Bee Venom Protein (Honey Bee) Anaphylaxis    Labetalol Other     \"RAPID HEARTBEAT\"    Amoxicillin Itching    Aspirin Hives    Influenza Virus Vaccine, Live Attenuated GI Upset    Influenza Virus Vaccines GI Upset    Losartan-Hydrochlorothiazide Other     DECREASE KIDNEY FUNCTION    Methylprednisolone Hives    Metoclopramide Other     raised B/P    Midazolam Other     had stroke    Pneumoc 13-Yanet Conj-Dip Cr(Pf) Other     RAISED BLOOD PRESSURE    Pneumococcal 23-Yanet Ps Vaccine Other     \"RAISED BLOOD PRESSURE\"    Pneumococcal 23-Valent Polysaccharide Vaccine Other     \"RAISED BLOOD PRESSURE\"    Sulfa (Sulfonamide Antibiotics) Other     \"FEELS LIKE BUNCH OF BEE STINGS\"        Examination:    Visit Vitals  /69 (BP Location: Left arm, Patient Position: Sitting, BP Cuff Size: Adult)   Pulse 61   Ht 1.727 m (5' 8\")   Wt 87.5 kg (193 lb)   BMI 29.35 kg/m²   Smoking Status Former   BSA 2.05 m²        Normal-built, well-nourished  with no apparent distress. Alert oriented  Skin:  Normal turgor.  No rash.  Head:  Normocephalic, atraumatic.  Eyes:  Pupils are equal, round,.  No pallor of conjunctivae.  Mouth has moist oral mucosa.  Neck:  Supple.  No JVD.  No clubbing, has peripheral osteoarthritis " present  There is puffiness of joints left lateral ankle, left knee mostly on the lateral side noted.  Chest:  Vesicular breathing Bilaterally good air entry.  No wheezing.  No crackles.  Heart:  Regular rate and rhythm.  S1, S2 positive.  No murmur.  Extremities:  Bilaterally has chronic venous stasis present.  Bilaterally 2+ dorsalis pedis pulses.  No calf tenderness. Homans sign is negative.  Neuro Exam: No focal signs. Gait is normal.     Venous Exam:  Varicose veins in left calf [ present.  Varicose veins in left thigh [ present.  Varicose veins in right calf [ present.  Varicose veins in right thigh [ present.  Reticular veins in left calf [ present.  Reticular veins in left thigh [ present.  Reticular veins in right calf [ present.  Reticular veins in right thigh [ present.  Telangiectasias in left calf [ present.  Telangiectasias in left thigh [ present.  Telangiectasias in right calf [ present.  Telangiectasias in right thigh [ present.  Right leg 1+ edema [ present.  Left leg 1+ edema [ present.  Hyperpigmentation in left leg [ present.  Hyperpigmentation in right leg [ present.  Lipodermatosclerosis in right leg absent ].  Lipodermatosclerosis in left leg [ absent ].  Dermatitis in left leg [ present.  Dermatitis in right leg [ present.  Corona phlebectatica in left leg [ present.  Corona phlebectatica in right leg [ present.  Atrophe annie in left leg absent ].  Atrophe annie in right leg  absent ].  Ulcer(s) in left leg  absent ].  Ulcer(s) in right leg  absent ].     Right leg CEAP C4racS     Left leg CEAP C4racS        Assessment/Plan :  Problem List Items Addressed This Visit       Varicose veins of leg with swelling    Varicose veins with pain    Venous stasis of both lower extremities - Primary    Chronic venous hypertension with complication involving both sides    Allergy to sulfa drugs       No orders of the defined types were placed in this encounter.      Plan     Chronic venous  insufficiency of bilateral lower extremities with other complications   adjunctive image guided foam sclerotherapy x 2 in each leg.  Because of patient's multiple allergies and also has specific allergic to sulfa it was decided that she would be high risk to be treated with sodium to radical sulfate.  It is difficult to predict from an allergy testing because of the irritation from the medication when the skin test is done.  We will get approval for available polidocanol form which is the Varithena.    Discussions   Patient's detail ultrasound evaluation including the illustration of the refluxing superficial venous system of both legs were reviewed in detail with the patient.  Patient has significant multiple superficial tributaries reflux identified giving rise to the symptoms.  Because of the progressive nature of venous disease and patient's continued symptoms in spite of doing the conservative management including lifestyle modification and wearing the graduated compression stockings further options were discussed with the patient. Closure of the truncal veins ultrasound-guided foam sclerotherapy  was discussed and as mentioned we will use the Varithena instead of STS because of the multiple allergies. Risks, benefits, goals and alternatives and reasonable expectations were discussed for at least 20 min. All risks were discussed, including, but not limited to hyperpigmentation, skin necrosis, recurrence/progression of the disease/symptoms, infection, DVT, SVT and all other possible complications. All questions were answered to patient satisfaction. Patients understands and wishes to proceed. Handouts were given to the patient regarding the procedures and also the explanation including the measures taken to prevent the possible complications  which includes wearing the graduated compression stockings immediately after procedure and overnight that day and also walking every hour about 10 minutes during her waking  time on the day of the procedure.  She will continue to wear the graduated compression stockings during the daytime for minimum 2 weeks and also she will be active including no traveling or prolonged nonambulatory status for 2 weeks to prevent DVT and other complications.      Counseling.  The patient was counseled regarding instructions for management, risk factor reductions, prognosis, patient and family education, impressions, risks and benefits of treatment options and importance of compliance with treatment. Total time of encounter was 35 minutes and 26 minutes was spent counseling.

## 2025-03-13 PROBLEM — Z88.2 ALLERGY TO SULFA DRUGS: Status: ACTIVE | Noted: 2025-03-13

## 2025-03-18 ENCOUNTER — TELEPHONE (OUTPATIENT)
Dept: PRIMARY CARE | Facility: CLINIC | Age: 75
End: 2025-03-18
Payer: MEDICARE

## 2025-03-18 NOTE — TELEPHONE ENCOUNTER
3/18/25-Vein codes 36465 x 4 and 93971 x 2 is pending review with [employer] portal Tracking# [id number]

## 2025-03-19 ENCOUNTER — TELEPHONE (OUTPATIENT)
Dept: PRIMARY CARE | Facility: CLINIC | Age: 75
End: 2025-03-19
Payer: MEDICARE

## 2025-03-19 NOTE — TELEPHONE ENCOUNTER
3/19/25-Vein codes 36465 x 4 and 93971 x 2 are approved Auth# [id number] valid 5/12--11/8/25 per [employer] portal. Both codes are valid & billable per [place] at [facility] call ref# [id number].

## 2025-04-09 ENCOUNTER — APPOINTMENT (OUTPATIENT)
Dept: PRIMARY CARE | Facility: CLINIC | Age: 75
End: 2025-04-09
Payer: MEDICARE

## 2025-04-09 VITALS
HEART RATE: 79 BPM | HEIGHT: 68 IN | DIASTOLIC BLOOD PRESSURE: 80 MMHG | WEIGHT: 190 LBS | TEMPERATURE: 97.8 F | SYSTOLIC BLOOD PRESSURE: 132 MMHG | BODY MASS INDEX: 28.79 KG/M2 | OXYGEN SATURATION: 97 %

## 2025-04-09 DIAGNOSIS — H61.21 IMPACTED CERUMEN OF RIGHT EAR: ICD-10-CM

## 2025-04-09 DIAGNOSIS — M62.838 NECK MUSCLE SPASM: Primary | ICD-10-CM

## 2025-04-09 DIAGNOSIS — M54.2 NECKACHE: ICD-10-CM

## 2025-04-09 PROCEDURE — 1159F MED LIST DOCD IN RCRD: CPT | Performed by: INTERNAL MEDICINE

## 2025-04-09 PROCEDURE — 3079F DIAST BP 80-89 MM HG: CPT | Performed by: INTERNAL MEDICINE

## 2025-04-09 PROCEDURE — G0268 REMOVAL OF IMPACTED WAX MD: HCPCS | Performed by: INTERNAL MEDICINE

## 2025-04-09 PROCEDURE — 99214 OFFICE O/P EST MOD 30 MIN: CPT | Performed by: INTERNAL MEDICINE

## 2025-04-09 PROCEDURE — 3075F SYST BP GE 130 - 139MM HG: CPT | Performed by: INTERNAL MEDICINE

## 2025-04-09 PROCEDURE — G2211 COMPLEX E/M VISIT ADD ON: HCPCS | Performed by: INTERNAL MEDICINE

## 2025-04-09 PROCEDURE — 1036F TOBACCO NON-USER: CPT | Performed by: INTERNAL MEDICINE

## 2025-04-09 PROCEDURE — 1157F ADVNC CARE PLAN IN RCRD: CPT | Performed by: INTERNAL MEDICINE

## 2025-04-09 PROCEDURE — 1124F ACP DISCUSS-NO DSCNMKR DOCD: CPT | Performed by: INTERNAL MEDICINE

## 2025-04-09 RX ORDER — TIZANIDINE HYDROCHLORIDE 4 MG/1
4 CAPSULE, GELATIN COATED ORAL 3 TIMES DAILY
Qty: 15 CAPSULE | Refills: 11 | Status: SHIPPED | OUTPATIENT
Start: 2025-04-09 | End: 2025-04-10 | Stop reason: WASHOUT

## 2025-04-09 ASSESSMENT — PATIENT HEALTH QUESTIONNAIRE - PHQ9
1. LITTLE INTEREST OR PLEASURE IN DOING THINGS: NOT AT ALL
SUM OF ALL RESPONSES TO PHQ9 QUESTIONS 1 AND 2: 0
2. FEELING DOWN, DEPRESSED OR HOPELESS: NOT AT ALL
2. FEELING DOWN, DEPRESSED OR HOPELESS: NOT AT ALL
SUM OF ALL RESPONSES TO PHQ9 QUESTIONS 1 AND 2: 0
1. LITTLE INTEREST OR PLEASURE IN DOING THINGS: NOT AT ALL

## 2025-04-09 ASSESSMENT — ENCOUNTER SYMPTOMS
LOSS OF SENSATION IN FEET: 0
OCCASIONAL FEELINGS OF UNSTEADINESS: 1
DEPRESSION: 0

## 2025-04-10 ENCOUNTER — TELEPHONE (OUTPATIENT)
Dept: PRIMARY CARE | Facility: CLINIC | Age: 75
End: 2025-04-10
Payer: MEDICARE

## 2025-04-10 DIAGNOSIS — M62.838 NECK MUSCLE SPASM: ICD-10-CM

## 2025-04-10 RX ORDER — TIZANIDINE 4 MG/1
4 TABLET ORAL EVERY 8 HOURS PRN
Qty: 30 TABLET | Refills: 0 | Status: SHIPPED | OUTPATIENT
Start: 2025-04-10 | End: 2025-04-20

## 2025-04-10 NOTE — PROGRESS NOTES
Subjective     Patient ID: Daysi Resendiz is a 75 y.o. female who presents for Neck Pain (She states she fell on the ice in March. She hit right side of face and head. She had bruising around her eyes and hands. She is having right side of neck pain radiating to the top of her head. ), Dizziness (She states she does have eugenia's but dizziness and balance has worsened), and Med Management (She is requesting to taper off of Zoloft).  History of Present Illness  Daysi Resendiz is a 75 year old female who presents with head and neck pain following a fall. She was referred by Dr. Cobian for evaluation of frequent UTIs and recent tests.    She experienced a fall on March 1st in her daughter's driveway due to icy conditions. During the fall, her right foot got stuck in the spokes of a truck wheel, causing her to fall forward and hit the right side of her head and face. She developed a hematoma around her right eye, described as a 'raccoon eye'. Initially, she did not seek emergency care as the pain was not severe, and she felt fine with no nausea, vomiting, confusion, or signs of concussion.    A few days after the fall, she developed a headache on the right side of her head, which she initially attributed to healing. She also noticed neck stiffness and limited range of motion, which has persisted. She describes difficulty turning her neck fully to the right and reports that her neck muscles feel tight. She does not drive often, as her fiancé usually drives her, but she did drive to her daughter's house recently. No significant pain in the head and neck areas is reported.    She reports a sensation of buzzing in her right ear when rubbing the area, which she initially thought might be fluid-related.    Since a hospital stay in August, she has been experiencing emotional distress characterized by frequent crying. She was prescribed sertraline 50 mg, which she reduced to 25 mg due to side effects such as feeling 'zoned  "out'. She wants to taper off the medication.    Objective   Vitals:    04/09/25 1512   BP: 132/80   BP Location: Right arm   Patient Position: Sitting   Pulse: 79   Temp: 36.6 °C (97.8 °F)   TempSrc: Temporal   SpO2: 97%   Weight: 86.2 kg (190 lb)   Height: 1.727 m (5' 8\")     Wt Readings from Last 10 Encounters:   04/09/25 86.2 kg (190 lb)   03/12/25 87.5 kg (193 lb)   03/10/25 78.9 kg (174 lb)   03/03/25 81.2 kg (179 lb)   01/15/25 88.5 kg (195 lb)   11/05/24 90.7 kg (200 lb)   09/05/24 89.2 kg (196 lb 9.6 oz)   08/24/24 89.4 kg (197 lb)   08/23/24 90.3 kg (199 lb)   07/02/24 91.2 kg (201 lb)       Medication  Current Outpatient Medications   Medication Instructions    acetaminophen (TYLENOL 8 HOUR) 1,300 mg, Daily    atorvastatin (LIPITOR) 20 mg, oral, Nightly    BD SafetyGlide Needle 25 gauge x 1\" needle USE WITH SOLUCORTEF FOR ADRENAL CRISIS.    clopidogrel (PLAVIX) 75 mg, oral, Daily    cyanocobalamin, vitamin B-12, (VITAMIN B-12 INJ) As needed    diphenhydrAMINE-acetaminophen (Tylenol PM)  mg per tablet 1 tablet, Nightly PRN    hydroCHLOROthiazide (HYDRODIURIL) 25 mg, oral, Daily    hydrocortisone (CORTEF) 15 mg, oral, Every morning, TAKE ONE AND A HALF TABLETS BY MOUTH IN THE MORNING THEN TAKE HALF A TABLET AT 3PM THEN TAKE HALF A TABLET AT BEDTIME    methocarbamol (ROBAXIN) 500 mg, oral, 4 times daily PRN    metoprolol succinate XL (Toprol-XL) 50 mg 24 hr tablet 1 tablet, Daily (0630)    nitrofurantoin (MACRODANTIN) 50 mg, oral, Daily    sertraline (Zoloft) 50 mg tablet 0.5 tablets, Daily (0630)    Solu-CORTEF Act-O-Vial, PF, 100 mg/2 mL injection if needed. FOR EMERGENCY    tiZANidine (ZANAFLEX) 4 mg, oral, 3 times daily     Physical Exam  HEENT: Impacted cerumen, fungal infection, and tenderness in right ear canal.  MUSCULOSKELETAL: Muscle spasm in neck.    Physical Exam  Gen: Alert, awake, Oriented X 3. Not in any acute distress   HEENT:  Atraumatic, PERRL.  Conjunctivae clear.   Moist nasal mucous " "membranes. oropharynx non erythematous,   Neck:  Supple without thyromegaly or lymphadenopathy.  Lungs:  Clear to auscultation without rales, rhonchi, or rub.  Heart:  RRR, S1, S2, without M.  Abdomen:  Soft, non tender, no organ enlargement, bruit. Bowel sounds present . No CVA tenderness.  Extremities:  No edema. No calf swelling or tenderness.    Skin:  No rash, ecchymosis or erythema.      Review of Systems   Constitutional:  No activity change or fever   HENT:  Denies ringing ears or nose bleed   Respiratory:  Denies stridor. No blood in sputum   Cardiovascular:  Denies chest pain, no sudden excessive sweating   Gastrointestinal:  No sour burping, no blood in stool    Genitourinary:  Denies blood in urine    Musculoskeletal:  No joint redness or swelling    Skin:  No new spot changing color or shape or border    Neurological:  No speech difficulty, facial droop    Psychiatric/Behavioral:  No agitation, denies Hallucination     Recent Labs   Lab Results   Component Value Date    WBC 9.0 11/18/2024    HGB 13.7 11/18/2024    HCT 41.7 11/18/2024     11/18/2024    CHOL 153 05/10/2022    TRIG 113 05/10/2022    HDL 49.0 05/10/2022    ALT 7 08/25/2024    AST 14 08/25/2024     11/18/2024    K 4.3 11/18/2024     11/18/2024    CREATININE 1.16 (H) 11/18/2024    BUN 22 11/18/2024    CO2 31 11/18/2024    TSH 0.85 11/18/2024    INR 1.0 06/24/2020    HGBA1C 5.5 06/08/2021     Lab Results   Component Value Date    GLUCOSE 122 (H) 11/18/2024    CALCIUM 10.0 11/18/2024     11/18/2024    K 4.3 11/18/2024    CO2 31 11/18/2024     11/18/2024    BUN 22 11/18/2024    CREATININE 1.16 (H) 11/18/2024      No results found for: \"LDLCALC\"  Lab Results   Component Value Date    HGBA1C 5.5 06/08/2021    HGBA1C 5.4 08/08/2019    HGBA1C 5.6 06/13/2019     Lab Results   Component Value Date    CREATININE 1.16 (H) 11/18/2024       Assessment/Plan     1. Neck muscle spasm  tiZANidine (Zanaflex) 4 mg capsule      2. " Neckache  Physical therapy will help however patient did not want any form of physical therapy referral.  She will do warm compression at home and neck muscle exercise.      3. Impacted cerumen of right ear  Irrigation was done with curette and ear syringing.        PROCEDURE:  The impacted ear wax cleaning procedure through irrigation with hydrogen peroxide and wax curette was explained to the patient.  Risk and alternatives were also explained.  Patient verbally consented to this procedure.  First utilizing a wax curette, soft wax was removed from right ear .  Thereafter, about 30 mL of lukewarm water mixed with hydrogen peroxide was instilled in each ear with a high pressure ear wax irrigation syringe.  Hefty amount of wax was seen coming out in the kidney tray.  The right tympanic membranes, thereafter were visualized with intact cone of lights.  The patient tolerated the procedure well.      Assessment & Plan  Neck muscle spasm  Neck pain post-fall attributed to muscle spasm without structural injury.  - Prescribed Zanaflex for muscle relaxation, to be taken at bedtime or when not driving.  - Recommended warm compresses and topical Bengay for relief.  - Offered referral to physical therapy if symptoms worsen.    Impacted earwax with fungal infection  Buzzing sound due to impacted earwax and possible fungal infection.  - Performed ear irrigation with hydrogen peroxide and water jet.  - Advised white vinegar drops for fungal infection management.    Depression  Zoloft prescribed for post-hospitalization depression; dose self-reduced due to side effects.  - Implemented Zoloft tapering schedule over five weeks.          VISIT SUMMARY:  Today, we discussed your head and neck pain following your fall, frequent UTIs, and recent emotional distress. We also addressed the buzzing sensation in your right ear.    YOUR PLAN:  -NECK MUSCLE SPASM: Your neck pain is due to muscle spasms from your fall, but there is no  structural injury. You are prescribed Zanaflex to relax your muscles, which you should take at bedtime or when you are not driving. Additionally, use warm compresses and apply Bengay to the affected area for relief. If your symptoms worsen, we can refer you to physical therapy.    -IMPACTED EARWAX WITH FUNGAL INFECTION: The buzzing sound in your right ear is due to impacted earwax and a possible fungal infection. We performed ear irrigation with hydrogen peroxide and a water jet to clean your ear. You should use white vinegar drops to manage the fungal infection.    -DEPRESSION: You have been experiencing emotional distress since your hospital stay in August. You were prescribed Zoloft, but you reduced the dose due to side effects. We have provided a tapering schedule to gradually reduce your Zoloft dose over five weeks.    INSTRUCTIONS:  Follow the Zoloft tapering schedule provided to gradually reduce your dose over five weeks. If your neck pain worsens, contact us for a referral to physical therapy.        This medical note was created with the assistance of artificial intelligence (AI) for documentation purposes. The content has been reviewed and confirmed by the healthcare provider for accuracy and completeness. Patient consented to the use of audio recording and use of AI during their visit.

## 2025-04-10 NOTE — TELEPHONE ENCOUNTER
Patient called the office today in regards to tiZANidine (Zanaflex) 4 mg capsule (rx by [facility] on 04/09) Patient states that their insurance will only cover tablets not capsules. Patient is requesting to have a new rx for tablets. Please advise

## 2025-04-19 DIAGNOSIS — I10 BENIGN ESSENTIAL HYPERTENSION: Primary | ICD-10-CM

## 2025-04-24 RX ORDER — METOPROLOL SUCCINATE 50 MG/1
50 TABLET, EXTENDED RELEASE ORAL DAILY
Qty: 90 TABLET | Refills: 3 | Status: SHIPPED | OUTPATIENT
Start: 2025-04-24 | End: 2026-04-24

## 2025-04-30 LAB
BASOPHILS # BLD AUTO: 58 CELLS/UL (ref 0–200)
BASOPHILS NFR BLD AUTO: 0.8 %
EOSINOPHIL # BLD AUTO: 212 CELLS/UL (ref 15–500)
EOSINOPHIL NFR BLD AUTO: 2.9 %
ERYTHROCYTE [DISTWIDTH] IN BLOOD BY AUTOMATED COUNT: 13 % (ref 11–15)
HCT VFR BLD AUTO: 39.8 % (ref 35–45)
HGB BLD-MCNC: 13.6 G/DL (ref 11.7–15.5)
LYMPHOCYTES # BLD AUTO: 2796 CELLS/UL (ref 850–3900)
LYMPHOCYTES NFR BLD AUTO: 38.3 %
MCH RBC QN AUTO: 32 PG (ref 27–33)
MCHC RBC AUTO-ENTMCNC: 34.2 G/DL (ref 32–36)
MCV RBC AUTO: 93.6 FL (ref 80–100)
MONOCYTES # BLD AUTO: 540 CELLS/UL (ref 200–950)
MONOCYTES NFR BLD AUTO: 7.4 %
NEUTROPHILS # BLD AUTO: 3694 CELLS/UL (ref 1500–7800)
NEUTROPHILS NFR BLD AUTO: 50.6 %
PLATELET # BLD AUTO: 259 THOUSAND/UL (ref 140–400)
PMV BLD REES-ECKER: 10.1 FL (ref 7.5–12.5)
RBC # BLD AUTO: 4.25 MILLION/UL (ref 3.8–5.1)
WBC # BLD AUTO: 7.3 THOUSAND/UL (ref 3.8–10.8)

## 2025-05-19 ENCOUNTER — APPOINTMENT (OUTPATIENT)
Dept: PRIMARY CARE | Facility: CLINIC | Age: 75
End: 2025-05-19
Payer: MEDICARE

## 2025-05-19 VITALS — DIASTOLIC BLOOD PRESSURE: 64 MMHG | SYSTOLIC BLOOD PRESSURE: 142 MMHG | HEART RATE: 64 BPM

## 2025-05-19 DIAGNOSIS — I87.8 VENOUS STASIS OF BOTH LOWER EXTREMITIES: Primary | ICD-10-CM

## 2025-05-19 DIAGNOSIS — I87.393 CHRONIC VENOUS HYPERTENSION WITH COMPLICATION INVOLVING BOTH SIDES: ICD-10-CM

## 2025-05-19 PROBLEM — J42 CHRONIC BRONCHITIS, UNSPECIFIED CHRONIC BRONCHITIS TYPE (MULTI): Status: RESOLVED | Noted: 2024-11-05 | Resolved: 2025-05-19

## 2025-05-19 NOTE — PROGRESS NOTES
Varithena Administration Procedure Operative Notes : Left leg    Performed by Nadia West MD, [facility], [facility]    Sonographer: Performed by: Windy Goodwin RVT    Pre Procedure: The need for sclerotherapy was based on the previous abnormal vein findings from a physical examination and duplex ultrasound evaluation with color flow, spectral Doppler of the saphenous venous system evaluated in a separate procedure dated 3/5/2025 and ultrasound reports regarding the reflux is available in the chart. These refluxing or abnormal veins were treated using ultrasound-guided sclerotherapy injections to chemically ablate, or close, the affected veins. The goal of treatment is to improve the venous hemodynamics of this extremity and improve the patient's vein symptoms of vein disease. A 13-5 MHz linear array ultrasound transducer was used to identify the mapped veins. The needle was then strategically placed into the abnormal veins under ultrasound guidance. Administration of the sclerosing agent and the vessels' responses were observed by ultrasound. Duplex ultrasonography was used to diagnose the disease, confirm treatment success, and locate persistent/recurrent refluxing veins. Any additional abnormalities, e.g. deep venous involvement, were noted.    Provider's Assessment The patient returns today for follow-up of the lower extremity. The patient is doing well. There are still some residual symptoms, as previously noted, including pain. Overall, the patient's symptoms have improved. There is no swelling noted. No infection. Duplex Ultrasound shows remaining refluxing tributaries, which will be treated today.     The patient was given an explanation of the protocol for the administration of Varithena. We went over the potential adverse reactions and what to do in the event that one or more occur.     Time out: Immediately prior to the procedure, the entire procedure team, along with the patient, verified the patient's  identification, intended procedure, correct procedure site, and that all equipment and supplies were present. The patient signed the informed consent after reviewing the risks, benefits, and alternatives previously discussed with the patient by the physician. Time 1:45 PM.    Anesthesia: None.    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  She is very concerned about allergic reaction for sodium to tachycardia sulfate and the Varithena is approved.    After agreement, the patient was positioned on  the  examination  table  to  examine  and  measure  the  veins  requiring  treatment .    Procedure:     The patient was positioned horizontally on the procedure table, and the skin of lower extremity treatment area was prepped with 70% isopropyl alcohol. Under ultrasound guidance, the abnormal veins were accessed with 25 gauge percutaneous needles. Administration of the sclerosing agent and the vessels' responses were observed with ultrasound. 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    Treated Areas: thigh, calf and ankle, from posterior lateral, medial and anterior. Patient received a total of 9 ml of Varithena administered intravenously in 3 injections to sclerose selected veins.    After the administration of Varithena the patient was asked to dorsiflex the ankle to limit flow of foam into perforating veins. Once appropriate spasm had been confirmed in the treated veins, the access device was removed from the leg and light pressure was applied to the puncture site for hemostasis.    The common femoral and deep  superficial veins were then evaluated for flow and compressibility prior to dressing placement. The lower extremity was kept elevated at 45-degree angle and a multilayer dressing was applied including an under layer, compression pad, and thigh high 30-40 mmHg compression stocking to the patient. The leg was lowered only after compression had been applied.    The patient ambulated 10 minutes under supervision and was without concerns at time of release. Post care instructions include walking, wearing compression 24 hours per day, taking off only to shower and then reapplying for two weeks.     Complications: None.    Post Treatment Assessment: The patient tolerated the procedure well. Discharge instructions were given to and discussed with the patient. The patient was instructed to phone for any unusual signs or symptoms. The patient understands and agrees. , In-office observation with elevation of the legs was utilized after treatment. , Graduated compression stockings were applied in the office. The patient is to wear them for: Overnight for a day and Daily for 14 day(s). The patient tolerated the procedure well. Discharge instructions were given to and discussed with the patient. The patient was instructed to phone for any unusual signs or symptoms. The patient understands and agrees. ,The patient was instructed to take an anti-inflammatory medicine as needed and to follow up for duplex scan for deep venous system within 3 to 5 days.  Follow-up evaluation of the treatment is planned in  14  days.

## 2025-05-21 ENCOUNTER — APPOINTMENT (OUTPATIENT)
Dept: PRIMARY CARE | Facility: CLINIC | Age: 75
End: 2025-05-21
Payer: MEDICARE

## 2025-05-21 ENCOUNTER — APPOINTMENT (OUTPATIENT)
Dept: VASCULAR SURGERY | Facility: CLINIC | Age: 75
End: 2025-05-21
Payer: MEDICARE

## 2025-05-21 VITALS — DIASTOLIC BLOOD PRESSURE: 71 MMHG | SYSTOLIC BLOOD PRESSURE: 131 MMHG | HEART RATE: 67 BPM

## 2025-05-21 DIAGNOSIS — I87.8 VENOUS STASIS OF BOTH LOWER EXTREMITIES: Primary | ICD-10-CM

## 2025-05-21 DIAGNOSIS — I87.393 CHRONIC VENOUS HYPERTENSION WITH COMPLICATION INVOLVING BOTH SIDES: ICD-10-CM

## 2025-05-21 NOTE — PROGRESS NOTES
Varithena Administration Procedure Operative Notes : Right leg    Patient was treated with Varithena sclerotherapy because of patient is allergic to sulfa and also she has about 14 allergies.  She also has history of Raghu's disease.    Performed by Nadia West MD, FACP, [facility]    Sonographer: Performed by: Windy Goodwin RVT    Pre Procedure: The need for sclerotherapy was based on the previous abnormal vein findings from a physical examination and duplex ultrasound evaluation with color flow, spectral Doppler of the saphenous venous system evaluated in a separate procedure dated 3/5/2025 and ultrasound reports regarding the reflux is available in the chart. These refluxing or abnormal veins were treated using ultrasound-guided sclerotherapy injections to chemically ablate, or close, the affected veins. The goal of treatment is to improve the venous hemodynamics of this extremity and improve the patient's vein symptoms of vein disease. A 13-5 MHz linear array ultrasound transducer was used to identify the mapped veins. The needle was then strategically placed into the abnormal veins under ultrasound guidance. Administration of the sclerosing agent and the vessels' responses were observed by ultrasound. Duplex ultrasonography was used to diagnose the disease, confirm treatment success, and locate persistent/recurrent refluxing veins. Any additional abnormalities, e.g. deep venous involvement, were noted.    Provider's Assessment The patient returns today for follow-up of the lower extremity. The patient is doing well. There are still some residual symptoms, as previously noted, including pain. Overall, the patient's symptoms have improved. There is no swelling noted. No infection. Duplex Ultrasound shows remaining refluxing tributaries, which will be treated today.     The patient was given an explanation of the protocol for the administration of Varithena. We went over the potential adverse reactions and what to do  in the event that one or more occur.     Time out: Immediately prior to the procedure, the entire procedure team, along with the patient, verified the patient's identification, intended procedure, correct procedure site, and that all equipment and supplies were present. The patient signed the informed consent after reviewing the risks, benefits, and alternatives previously discussed with the patient by the physician. Time 1:50 PM.    Anesthesia: None.    After agreement, the patient was positioned on  the  examination  table  to  examine  and  measure  the  veins  requiring  treatment .    Procedure:     The patient was positioned horizontally on the procedure table, and the skin of lower extremity treatment area was prepped with 70% isopropyl alcohol. Under ultrasound guidance, the abnormal veins were accessed with 25 gauge percutaneous needles. Administration of the sclerosing agent and the vessels' responses were observed with ultrasound. The puncture/injection(s) was performed under ultrasound guidance    Varithena  Lot number [id number] Expiry 03/ 2026    visible varicosities with significant reflux of the great saphenous vein and other truncal venous system which were identified by the ultrasound examination as previously were also injected with Varithena 0.5 cc/s with close observation and ultrasound till the refluxing tributaries and a complete spasm.    Treated Areas: thigh, calf and ankle, from posterior lateral, medial and anterior. Patient received a total of 11 ml of Varithena administered intravenously in 3 injections to sclerose selected veins.    After the administration of Varithena the patient was asked to dorsiflex the ankle to limit flow of foam into perforating veins. Once appropriate spasm had been confirmed in the treated veins, the access device was removed from the leg and light pressure was applied to the puncture site for hemostasis.    The common femoral and deep superficial veins were  then evaluated for flow and compressibility prior to dressing placement. The lower extremity was kept elevated at 45-degree angle and a multilayer dressing was applied including an under layer, compression pad, and thigh high 30-40 mmHg compression stocking to the patient. The leg was lowered only after compression had been applied.    The patient ambulated 10 minutes under supervision and was without concerns at time of release. Post care instructions include walking, wearing compression 24 hours per day, taking off only to shower and then reapplying for two weeks.     Complications: None.    Post Treatment Assessment: The patient tolerated the procedure well. Discharge instructions were given to and discussed with the patient. The patient was instructed to phone for any unusual signs or symptoms. The patient understands and agrees. , In-office observation with elevation of the legs was utilized after treatment. , Graduated compression stockings were applied in the office. The patient is to wear them for: Overnight for a day and Daily for 14 day(s). The patient tolerated the procedure well. Discharge instructions were given to and discussed with the patient. The patient was instructed to phone for any unusual signs or symptoms. The patient understands and agrees. ,The patient was instructed to take an anti-inflammatory medicine as needed and to follow up for duplex scan for deep venous system within 3 to 5 days.  Follow-up evaluation of the treatment is planned in  14  days.

## 2025-05-21 NOTE — PROGRESS NOTES
Lower Extremity Deep Venous Duplex, Left    Sonographer: Windy Goodwin RVT    Technique: Venous duplex ultrasound with color and spectral Doppler wave modalities, has been performed with the patient in supine position to determine the status of the deep venous system of the left lower extremities. The deep venous system is imaged at the left common femoral, femoral, popliteal, posterior tibial, peroneal and, as noted, gastrocenemius veins. The equipment used to perform the examination is the [employer] x300 duplex ultrasound machine utilizing 8-3 MHz transducers. Compression, augmentation and phasicity (as appropriate) were evaluated to rule-out venous thrombosis or obstruction. Spectral Doppler analysis and confirmatory images were documented.     Findings:  Common Femoral Vein (CFV) : The common femoral vein is, positive compress, positive augment, positive phasic.    Contralateral CFV: The contralateral vein is positive compress, positive augment, positive phasic.    Saphenofemoral Junction (SFJ): The saphenofemoral junction is positive compress.    Proximal Femoral Vein: The proximal femoral vein is positive compress.    Mid Femoral Vein: The mid femoral vein is positive compress.    Distal Femoral Vein: The distal femoral vein is positive compress.    Popliteal Vein: The popliteal vein is positive compress, positive augment, positive phasic.    Saphenopopliteal Junction (SPJ): The saphenopopliteal junction is positive compress.    Posterior Tibial Veins: The posterior tibial veins are positive compress.    Peroneal Veins: The peroneal veins are positive compress.    Gastrocnemius Veins: The gastrocnemius veins are positive compress.    Additional Findings: N/A    IMPRESSION: Normal deep venous system at the Left lower extremity with no evidence of venous thrombosis or obstruction in the veins evaluated.

## 2025-05-22 ENCOUNTER — APPOINTMENT (OUTPATIENT)
Dept: VASCULAR SURGERY | Facility: CLINIC | Age: 75
End: 2025-05-22
Payer: MEDICARE

## 2025-05-22 DIAGNOSIS — I87.8 VENOUS STASIS OF BOTH LOWER EXTREMITIES: Primary | ICD-10-CM

## 2025-05-22 DIAGNOSIS — I87.393 CHRONIC VENOUS HYPERTENSION WITH COMPLICATION INVOLVING BOTH SIDES: ICD-10-CM

## 2025-05-22 NOTE — PROGRESS NOTES
Lower Extremity Deep Venous Duplex, Right    Sonographer: Windy Goodwin RVT    Technique: Venous duplex ultrasound with color and spectral Doppler wave modalities, has been performed with the patient in supine position to determine the status of the deep venous system of the right lower extremities. The deep venous system is imaged at the right common femoral, femoral, popliteal, posterior tibial, peroneal and, as noted, gastrocenemius veins. The equipment used to perform the examination is the [employer] x300 duplex ultrasound machine utilizing 8-3 MHz transducers. Compression, augmentation and phasicity (as appropriate) were evaluated to rule-out venous thrombosis or obstruction. Spectral Doppler analysis and confirmatory images were documented.     Findings:  Common Femoral Vein (CFV) : The common femoral vein is, positive compress, positive augment, positive phasic.    Contralateral CFV: The contralateral vein is positive compress, positive augment, positive phasic.    Saphenofemoral Junction (SFJ): The saphenofemoral junction is positive compress.    Proximal Femoral Vein: The proximal femoral vein is positive compress.    Mid Femoral Vein: The mid femoral vein is positive compress.    Distal Femoral Vein: The distal femoral vein is positive compress.    Popliteal Vein: The popliteal vein is positive compress, positive augment, positive phasic.    Saphenopopliteal Junction (SPJ): The saphenopopliteal junction is positive compress.    Posterior Tibial Veins: The posterior tibial veins are positive compress.    Peroneal Veins: The peroneal veins are positive compress.    Gastrocnemius Veins: The gastrocnemius veins are positive compress.    Additional Findings: N/A    IMPRESSION: Normal deep venous system at the RIGHT lower extremity with no evidence of venous thrombosis or obstruction in the veins evaluated.

## 2025-05-23 ENCOUNTER — APPOINTMENT (OUTPATIENT)
Dept: PRIMARY CARE | Facility: CLINIC | Age: 75
End: 2025-05-23
Payer: MEDICARE

## 2025-05-23 VITALS
OXYGEN SATURATION: 97 % | TEMPERATURE: 96.4 F | DIASTOLIC BLOOD PRESSURE: 68 MMHG | WEIGHT: 216 LBS | HEIGHT: 67 IN | BODY MASS INDEX: 33.9 KG/M2 | HEART RATE: 71 BPM | SYSTOLIC BLOOD PRESSURE: 138 MMHG

## 2025-05-23 DIAGNOSIS — N18.31 CHRONIC KIDNEY DISEASE, STAGE 3A (MULTI): ICD-10-CM

## 2025-05-23 DIAGNOSIS — I10 BENIGN ESSENTIAL HYPERTENSION: ICD-10-CM

## 2025-05-23 DIAGNOSIS — Z71.2 ENCOUNTER TO DISCUSS TEST RESULTS: ICD-10-CM

## 2025-05-23 DIAGNOSIS — E27.1 PRIMARY ADRENOCORTICAL INSUFFICIENCY: ICD-10-CM

## 2025-05-23 DIAGNOSIS — H61.21 IMPACTED CERUMEN OF RIGHT EAR: Primary | ICD-10-CM

## 2025-05-23 DIAGNOSIS — Z76.0 ENCOUNTER FOR MEDICATION REFILL: ICD-10-CM

## 2025-05-23 RX ORDER — CLOPIDOGREL BISULFATE 75 MG/1
75 TABLET ORAL DAILY
Qty: 90 TABLET | Refills: 1 | Status: SHIPPED | OUTPATIENT
Start: 2025-05-23

## 2025-05-23 ASSESSMENT — PATIENT HEALTH QUESTIONNAIRE - PHQ9
2. FEELING DOWN, DEPRESSED OR HOPELESS: NOT AT ALL
SUM OF ALL RESPONSES TO PHQ9 QUESTIONS 1 AND 2: 0
1. LITTLE INTEREST OR PLEASURE IN DOING THINGS: NOT AT ALL

## 2025-05-23 NOTE — PROGRESS NOTES
"Subjective   Chief complaint: Daysi Resendiz is a 75 y.o. female who presents for Earache (3 - 4 weeks).    HPI:  Daysi is here today with a complaint of right ear being blocked.  Her hearing is down in that ear.    No right ear pain.  Reports she started having right ear trouble when she fell in February and landed on the right side of her head.  Had been seen here earlier and told to use vinegar in her ear canal but she says it did not work.  Would like her ears checked.  Recently had a CBC done.    Not sure if she needs refills.      Earache         Objective   /68 (BP Location: Left arm, Patient Position: Sitting, BP Cuff Size: Large adult)   Pulse 71   Temp 35.8 °C (96.4 °F) (Temporal)   Ht 1.702 m (5' 7\")   Wt 98 kg (216 lb)   SpO2 97%   BMI 33.83 kg/m²   Physical Exam  Head: atraumatic  ENT: left TM is gray, no cerumen in canal, dry skin at outer ear  Right TM obscured with cerumen.  Outer ear with dry skin.  PROCEDURE:  irrigated canal with movement of cerumen.  TM is gray.    Review of Systems   HENT:  Positive for ear pain.       I have reviewed and reconciled the medication list with the patient today. Current Medications[1]     Imaging:  Imaging  No results found.    Cardiology, Vascular, and Other Imaging  No other imaging results found for the past 7 days       Labs reviewed:    Lab Results   Component Value Date    WBC 7.3 04/29/2025    HGB 13.6 04/29/2025    HCT 39.8 04/29/2025     04/29/2025    CHOL 153 05/10/2022    TRIG 113 05/10/2022    HDL 49.0 05/10/2022    ALT 7 08/25/2024    AST 14 08/25/2024     11/18/2024    K 4.3 11/18/2024     11/18/2024    CREATININE 1.16 (H) 11/18/2024    BUN 22 11/18/2024    CO2 31 11/18/2024    TSH 0.85 11/18/2024    INR 1.0 06/24/2020    HGBA1C 5.5 06/08/2021       Assessment/Plan   Problem List Items Addressed This Visit       Benign essential hypertension    Adequate control.  No change in management at this time.         Relevant " "Medications    clopidogrel (Plavix) 75 mg tablet    Encounter for medication refill    Medication reviewed.  Refills given.         Encounter to discuss test results    Reviewed lab/testing results with the patient and provided patient education regarding the results.           Impacted cerumen of right ear - Primary    Cerumen moved, no impaction.            Continue current medications as listed.  Reviewed medications.  Follow up in 3-6 months as needed.          [1]   Current Outpatient Medications:     acetaminophen (Tylenol 8 HOUR) 650 mg ER tablet, Take 2 tablets (1,300 mg) by mouth once daily. Do not crush, chew, or split.  Take in the morning., Disp: , Rfl:     atorvastatin (Lipitor) 20 mg tablet, Take 1 tablet (20 mg) by mouth once daily at bedtime., Disp: 90 tablet, Rfl: 3    BD SafetyGlide Needle 25 gauge x 1\" needle, USE WITH SOLUCORTEF FOR ADRENAL CRISIS., Disp: , Rfl:     cyanocobalamin, vitamin B-12, (VITAMIN B-12 INJ), Inject as directed if needed. EVERY 3-6 MONTHS WHEN NEEDED BASED OFF LAB WORK, Disp: , Rfl:     diphenhydrAMINE-acetaminophen (Tylenol PM)  mg per tablet, Take 1 tablet by mouth as needed at bedtime for sleep., Disp: , Rfl:     hydroCHLOROthiazide (HYDRODiuril) 25 mg tablet, Take 1 tablet (25 mg) by mouth once daily., Disp: 90 tablet, Rfl: 3    hydrocortisone (Cortef) 10 mg tablet, Take 1.5 tablets (15 mg) by mouth once daily in the morning. TAKE ONE AND A HALF TABLETS BY MOUTH IN THE MORNING THEN TAKE HALF A TABLET AT 3PM THEN TAKE HALF A TABLET AT BEDTIME, Disp: 270 tablet, Rfl: 1    metoprolol succinate XL (Toprol-XL) 50 mg 24 hr tablet, Take 1 tablet (50 mg) by mouth once daily., Disp: 90 tablet, Rfl: 3    Solu-CORTEF Act-O-Vial, PF, 100 mg/2 mL injection, if needed. FOR EMERGENCY, Disp: , Rfl:     clopidogrel (Plavix) 75 mg tablet, Take 1 tablet (75 mg) by mouth once daily., Disp: 90 tablet, Rfl: 1    tiZANidine (Zanaflex) 4 mg tablet, Take 1 tablet (4 mg) by mouth every 8 " hours if needed for muscle spasms for up to 10 days., Disp: 30 tablet, Rfl: 0

## 2025-05-28 ENCOUNTER — APPOINTMENT (OUTPATIENT)
Dept: PRIMARY CARE | Facility: CLINIC | Age: 75
End: 2025-05-28
Payer: MEDICARE

## 2025-05-28 VITALS — DIASTOLIC BLOOD PRESSURE: 78 MMHG | SYSTOLIC BLOOD PRESSURE: 119 MMHG | HEART RATE: 65 BPM

## 2025-05-28 DIAGNOSIS — I87.393 CHRONIC VENOUS HYPERTENSION WITH COMPLICATION INVOLVING BOTH SIDES: ICD-10-CM

## 2025-05-28 DIAGNOSIS — I83.893 VARICOSE VEINS OF BOTH LEGS WITH EDEMA: Primary | ICD-10-CM

## 2025-05-28 PROCEDURE — 3078F DIAST BP <80 MM HG: CPT | Performed by: INTERNAL MEDICINE

## 2025-05-28 PROCEDURE — 3074F SYST BP LT 130 MM HG: CPT | Performed by: INTERNAL MEDICINE

## 2025-05-28 PROCEDURE — 93971 EXTREMITY STUDY: CPT | Performed by: INTERNAL MEDICINE

## 2025-05-28 PROCEDURE — 36465 NJX NONCMPND SCLRSNT 1 VEIN: CPT | Performed by: INTERNAL MEDICINE

## 2025-05-28 NOTE — PROGRESS NOTES
Venous Duplex      Indications:  Limb pain  Leg Edema    Sonographer: Windy Goodwin RVT    TECHNIQUE:  Venous Duplex ultrasound spectral Doppler wave modality was performed with the patient in the supine and upright positions to determine the status of the deep and superficial systems of the left lower extremity. The common femoral, femoral and popliteal veins of the deep venous system were imaged. The superficial system was imaged entirely to include, but was not limited to, the saphenous-femoral junction (SFJ) down the greater saphenous vein from the groin to the ankle, and the saphenous-popliteal junction (SPJ), if present, at the popliteal fossa down the small saphenous vein (SSV) to the ankle. As indicated, the cranial extensions of the SSV (CESSV), the anterior accessory GSV (AAGSV), and the posterior accessory GSV (PAGSV) were also evaluated, if present. All areas meeting the criteria for venous reflux (500 milliseconds or greater in the saphenous veins and principle branches, 350 milliseconds in perforators and 1000 milliseconds in the deep system) were confirmed with spectral Doppler analysis and confirmatory images were documented:     FINDINGS ARE THE FOLLOWING:    LEFT LEG:  There is no evidence of thrombus in the interrogated segments of the deep or superficial venous system. There is no evidence of deep venous reflux.     GSV dimensions: 3.0mm junction   proximal segment is not visualized   mid segment is not visualized   distal segment is not visualized  The Greater Saphenous Vein is non compressible, consistent with previous treatment    SSV dimensions: 2.0mm junction  2.5mm mid  There is <0.5 seconds of reflux in the Small Saphenous Vein    There are multiple incompetent tributaries along the thigh and lower leg.    Trib1 dimensions: 3.1mm  Trib2 dimensions: 3.1mm  There is >0.5 seconds of reflux in the tributaries     LEFT LEG CONCLUSION:      The Left leg venous duplex interrogation shows that  there is no evidence of deep vein thrombosis noted and evidence superficial vein thrombosis noted.     There is no evidence of deep venous reflux in left leg.     The patient’s Left greater saphenous vein not visible from previous procedures.     The patient’s Left  small saphenous vein has no reflux.     There are multiple dilated torturous refluxing tributaries noted in the left  upper , and   middle , and lower leg, measured ranging from 3.1 to 3.1 mm in diameter.    Varithena Administration Procedure Operative Notes : Left  Patient has allergic to sulfa and sulfates.  She also has adrenal insufficiency.  Varithena was used as per the authorization because of the specific problems for this patient.  Preauthorization is obtained.    Performed by Nadia West MD, FACP, [facility]    Sonographer: Performed by: Windy Goodwin RVT    Pre Procedure: The need for sclerotherapy was based on the previous abnormal vein findings from a physical examination and duplex ultrasound evaluation with color flow, spectral Doppler of the saphenous venous system evaluated in a separate procedure today and ultrasound reports regarding the reflux is available in the chart. These refluxing or abnormal veins were treated using ultrasound-guided sclerotherapy injections to chemically ablate, or close, the affected veins. The goal of treatment is to improve the venous hemodynamics of this extremity and improve the patient's vein symptoms of vein disease. A 13-5 MHz linear array ultrasound transducer was used to identify the mapped veins. The needle was then strategically placed into the abnormal veins under ultrasound guidance. Administration of the sclerosing agent and the vessels' responses were observed by ultrasound. Duplex ultrasonography was used to diagnose the disease, confirm treatment success, and locate persistent/recurrent refluxing veins. Any additional abnormalities, e.g. deep venous involvement, were noted.    Provider's Assessment  The patient returns today for follow-up of the lower extremity. The patient is doing well. There are still some residual symptoms, as previously noted, including pain. Overall, the patient's symptoms have improved. There is no swelling noted. No infection. Duplex Ultrasound shows remaining refluxing tributaries, which will be treated today.     The patient was given an explanation of the protocol for the administration of Varithena. We went over the potential adverse reactions and what to do in the event that one or more occur.     Time out: Immediately prior to the procedure, the entire procedure team, along with the patient, verified the patient's identification, intended procedure, correct procedure site, and that all equipment and supplies were present. The patient signed the informed consent after reviewing the risks, benefits, and alternatives previously discussed with the patient by the physician. Time 3 PM.    Anesthesia: None.    After agreement, the patient was positioned on  the  examination  table  to  examine  and  measure  the  veins  requiring  treatment .    Procedure:     The patient was positioned horizontally on the procedure table, and the skin of lower extremity treatment area was prepped with 70% isopropyl alcohol. Under ultrasound guidance, the abnormal veins were accessed with 25 gauge percutaneous needles. Administration of the sclerosing agent and the vessels' responses were observed with ultrasound. The puncture/injection(s) was performed under ultrasound guidance    Varithena  Lot number [id number] Expiry 03/ 2026    visible varicosities with significant reflux venous system which were identified by the ultrasound examination as previously were also injected with Varithena 0.5 cc/s with close observation and ultrasound till the refluxing tributaries had a complete spasm.    Treated Areas: thigh, calf and ankle, from posterior lateral, medial and anterior. Patient received a total of 7 ml  of Varithena administered intravenously in 4 injections to sclerose selected veins.    After the administration of Varithena the patient was asked to dorsiflex the ankle to limit flow of foam into perforating veins. Once appropriate spasm had been confirmed in the treated veins, the access device was removed from the leg and light pressure was applied to the puncture site for hemostasis.    The common femoral and deep superficial veins were then evaluated for flow and compressibility prior to dressing placement. The lower extremity was kept elevated at 45-degree angle and a multilayer dressing was applied including an under layer, compression pad, and thigh high 30-40 mmHg compression stocking to the patient. The leg was lowered only after compression had been applied.    The patient ambulated 10 minutes under supervision and was without concerns at time of release. Post care instructions include walking, wearing compression 24 hours per day, taking off only to shower and then reapplying for two weeks.     Complications: None.    Post Treatment Assessment: The patient tolerated the procedure well. Discharge instructions were given to and discussed with the patient. The patient was instructed to phone for any unusual signs or symptoms. The patient understands and agrees. , In-office observation with elevation of the legs was utilized after treatment. , Graduated compression stockings were applied in the office. The patient is to wear them for: Overnight for a day and Daily for 14 day(s). The patient tolerated the procedure well. Discharge instructions were given to and discussed with the patient. The patient was instructed to phone for any unusual signs or symptoms. The patient understands and agrees. ,The patient was instructed to take an anti-inflammatory medicine as needed and to follow up for duplex scan for deep venous system within 3 to 5 days.  Follow-up evaluation of the treatment is planned in  14  days.

## 2025-05-29 ENCOUNTER — APPOINTMENT (OUTPATIENT)
Dept: PRIMARY CARE | Facility: CLINIC | Age: 75
End: 2025-05-29
Payer: MEDICARE

## 2025-05-29 ENCOUNTER — APPOINTMENT (OUTPATIENT)
Dept: VASCULAR SURGERY | Facility: CLINIC | Age: 75
End: 2025-05-29
Payer: MEDICARE

## 2025-05-29 VITALS — SYSTOLIC BLOOD PRESSURE: 131 MMHG | DIASTOLIC BLOOD PRESSURE: 75 MMHG | HEART RATE: 66 BPM

## 2025-05-29 DIAGNOSIS — I87.393 CHRONIC VENOUS HYPERTENSION WITH COMPLICATION INVOLVING BOTH SIDES: ICD-10-CM

## 2025-05-29 DIAGNOSIS — I83.893 VARICOSE VEINS OF BOTH LEGS WITH EDEMA: Primary | ICD-10-CM

## 2025-05-29 DIAGNOSIS — I87.8 VENOUS STASIS OF BOTH LOWER EXTREMITIES: Primary | ICD-10-CM

## 2025-05-29 DIAGNOSIS — I83.893 VARICOSE VEINS OF BOTH LEGS WITH EDEMA: ICD-10-CM

## 2025-05-29 PROCEDURE — 93970 EXTREMITY STUDY: CPT | Performed by: INTERNAL MEDICINE

## 2025-05-29 PROCEDURE — 3078F DIAST BP <80 MM HG: CPT | Performed by: INTERNAL MEDICINE

## 2025-05-29 PROCEDURE — 3075F SYST BP GE 130 - 139MM HG: CPT | Performed by: INTERNAL MEDICINE

## 2025-05-29 PROCEDURE — 36465 NJX NONCMPND SCLRSNT 1 VEIN: CPT | Performed by: INTERNAL MEDICINE

## 2025-05-29 NOTE — PROGRESS NOTES
Venous Duplex      Indications:  Limb pain  Leg Edema    Sonographer: Windy Goodwin RVT    TECHNIQUE:  Venous Duplex ultrasound spectral Doppler wave modality was performed with the patient in the supine and upright positions to determine the status of the deep and superficial systems of the right lower extremity. The common femoral, femoral and popliteal veins of the deep venous system were imaged. The superficial system was imaged entirely to include, but was not limited to, the saphenous-femoral junction (SFJ) down the greater saphenous vein from the groin to the ankle, and the saphenous-popliteal junction (SPJ), if present, at the popliteal fossa down the small saphenous vein (SSV) to the ankle. As indicated, the cranial extensions of the SSV (CESSV), the anterior accessory GSV (AAGSV), and the posterior accessory GSV (PAGSV) were also evaluated, if present. All areas meeting the criteria for venous reflux (500 milliseconds or greater in the saphenous veins and principle branches, 350 milliseconds in perforators and 1000 milliseconds in the deep system) were confirmed with spectral Doppler analysis and confirmatory images were documented:     FINDINGS ARE THE FOLLOWING:    RIGHT LEG:  There is no evidence of thrombus in the interrogated segments of the deep or superficial venous system. There is no evidence of deep venous reflux.    GSV dimensions: 2.6mm junction   proximal segment is not visualized   mid segment is not visualized   distal segment is not visualized  The Greater Saphenous Vein appears to be absent, consistent with previous treatment    SSV dimensions: 4.4mm junction   mid segment is non compressible  The Small Saphenous Vein is non compressible, consistent with previous treatment    There are multiple incompetent tributaries along the thigh and lower leg.    Trib1 dimensions: 3.7mm  Trib2 dimensions: 3.1mm  There is >0.5 seconds of reflux in the tributaries    RIGHT LEG CONCLUSION:      The right  leg venous duplex interrogation shows that there is no evidence of deep vein thrombosis noted and no evidence superficial vein thrombosis noted.     There is no evidence of deep venous reflux in right leg     The patient’s right greater saphenous vein not visible from previous procedures.     The patient’s right small saphenous vein appears closed from previous procedure.     There are multiple dilated torturous refluxing tributaries noted in the right upper ,  middle , and lower leg, measured ranging from   3.7 to 3.1 mm in diameter.    Varithena Administration Procedure Operative Notes right leg    Patient was treated with Varithena and sclerotherapy because of patient's significant allergies including as per patient sulfa and also sulfate.  She also has primary adrenal insufficiency.    Performed by Nadia West MD, FACP, [facility]    Sonographer: Performed by: Windy Goodwin RVT    Pre Procedure: The need for sclerotherapy was based on the previous abnormal vein findings from a physical examination and duplex ultrasound evaluation with color flow, spectral Doppler of the saphenous venous system evaluated in a separate procedure today and ultrasound reports regarding the reflux is available in the chart. These refluxing or abnormal veins were treated using ultrasound-guided sclerotherapy injections to chemically ablate, or close, the affected veins. The goal of treatment is to improve the venous hemodynamics of this extremity and improve the patient's vein symptoms of vein disease. A 13-5 MHz linear array ultrasound transducer was used to identify the mapped veins. The needle was then strategically placed into the abnormal veins under ultrasound guidance. Administration of the sclerosing agent and the vessels' responses were observed by ultrasound. Duplex ultrasonography was used to diagnose the disease, confirm treatment success, and locate persistent/recurrent refluxing veins. Any additional abnormalities, e.g. deep  venous involvement, were noted.    Provider's Assessment The patient returns today for follow-up of the lower extremity. The patient is doing well. There are still some residual symptoms, as previously noted, including pain. Overall, the patient's symptoms have improved. There is no swelling noted. No infection. Duplex Ultrasound shows remaining refluxing tributaries, which will be treated today.     The patient was given an explanation of the protocol for the administration of Varithena. We went over the potential adverse reactions and what to do in the event that one or more occur.     Time out: Immediately prior to the procedure, the entire procedure team, along with the patient, verified the patient's identification, intended procedure, correct procedure site, and that all equipment and supplies were present. The patient signed the informed consent after reviewing the risks, benefits, and alternatives previously discussed with the patient by the physician. Time 2:05 PM.    Anesthesia: None.    After agreement, the patient was positioned on  the  examination  table  to  examine  and  measure  the  veins  requiring  treatment .    Procedure:     The patient was positioned horizontally on the procedure table, and the skin of lower extremity treatment area was prepped with 70% isopropyl alcohol. Under ultrasound guidance, the abnormal veins were accessed with 25 gauge percutaneous needles. Administration of the sclerosing agent and the vessels' responses were observed with ultrasound. The puncture/injection(s) was performed under ultrasound guidance    Varithena  Lot number [id number] Expiry 03/ 2026    visible varicosities with significant reflux of the great saphenous vein and other truncal venous system which were identified by the ultrasound examination as previously were also injected with Varithena 0.5 cc/s with close observation and ultrasound till the refluxing tributaries and a complete spasm.    Treated  Areas: thigh, calf and ankle, from posterior lateral, medial and anterior. Patient received a total of 3.7 ml of Varithena administered intravenously in 3.1 injections to sclerose selected veins.    After the administration of Varithena the patient was asked to dorsiflex the ankle to limit flow of foam into perforating veins. Once appropriate spasm had been confirmed in the treated veins, the access device was removed from the leg and light pressure was applied to the puncture site for hemostasis.    The common femoral and deep superficial veins were then evaluated for flow and compressibility prior to dressing placement. The lower extremity was kept elevated at 45-degree angle and a multilayer dressing was applied including an under layer, compression pad, and thigh high 30-40 mmHg compression stocking to the patient. The leg was lowered only after compression had been applied.    The patient ambulated 10 minutes under supervision and was without concerns at time of release. Post care instructions include walking, wearing compression 24 hours per day, taking off only to shower and then reapplying for two weeks.     Complications: None.    Post Treatment Assessment: The patient tolerated the procedure well. Discharge instructions were given to and discussed with the patient. The patient was instructed to phone for any unusual signs or symptoms. The patient understands and agrees. , In-office observation with elevation of the legs was utilized after treatment. , Graduated compression stockings were applied in the office. The patient is to wear them for: Overnight for a day and Daily for 14 day(s). The patient tolerated the procedure well. Discharge instructions were given to and discussed with the patient. The patient was instructed to phone for any unusual signs or symptoms. The patient understands and agrees. ,The patient was instructed to take an anti-inflammatory medicine as needed and to follow up for duplex  scan for deep venous system within 3 to 5 days.  Follow-up evaluation of the treatment is planned in  14  days.

## 2025-05-29 NOTE — PROGRESS NOTES
Lower Extremity Deep Venous Duplex, Left    Sonographer: Windy Goodwin RVT    Technique: Venous duplex ultrasound with color and spectral Doppler wave modalities, has been performed with the patient in supine position to determine the status of the deep venous system of the left lower extremities. The deep venous system is imaged at the left common femoral, femoral, popliteal, posterior tibial, peroneal and, as noted, gastrocenemius veins. The equipment used to perform the examination is the [employer] x300 duplex ultrasound machine utilizing 8-3 MHz transducers. Compression, augmentation and phasicity (as appropriate) were evaluated to rule-out venous thrombosis or obstruction. Spectral Doppler analysis and confirmatory images were documented.     Findings:  Common Femoral Vein (CFV) : The common femoral vein is, positive compress, positive augment, positive phasic.    Contralateral CFV: The contralateral vein is positive compress, positive augment, positive phasic.    Saphenofemoral Junction (SFJ): The saphenofemoral junction is positive compress.    Proximal Femoral Vein: The proximal femoral vein is positive compress.    Mid Femoral Vein: The mid femoral vein is positive compress.    Distal Femoral Vein: The distal femoral vein is positive compress.    Popliteal Vein: The popliteal vein is positive compress, positive augment, positive phasic.    Saphenopopliteal Junction (SPJ): The saphenopopliteal junction is positive compress.    Posterior Tibial Veins: The posterior tibial veins are positive compress.    Peroneal Veins: The peroneal veins are positive compress.    Gastrocnemius Veins: The gastrocnemius veins are positive compress.    Additional Findings: N/A    IMPRESSION: Normal deep venous system at the Left lower extremity with no evidence of venous thrombosis or obstruction in the veins evaluated.

## 2025-06-02 ENCOUNTER — APPOINTMENT (OUTPATIENT)
Dept: VASCULAR SURGERY | Facility: CLINIC | Age: 75
End: 2025-06-02
Payer: MEDICARE

## 2025-06-02 DIAGNOSIS — I87.8 VENOUS STASIS OF BOTH LOWER EXTREMITIES: Primary | ICD-10-CM

## 2025-06-02 DIAGNOSIS — I87.393 CHRONIC VENOUS HYPERTENSION WITH COMPLICATION INVOLVING BOTH SIDES: ICD-10-CM

## 2025-06-02 PROCEDURE — 93971 EXTREMITY STUDY: CPT | Performed by: INTERNAL MEDICINE

## 2025-06-02 NOTE — PROGRESS NOTES
Lower Extremity Deep Venous Duplex, Right    Sonographer: Windy Goodwin RVT    Technique: Venous duplex ultrasound with color and spectral Doppler wave modalities, has been performed with the patient in supine position to determine the status of the deep venous system of the right lower extremities. The deep venous system is imaged at the right common femoral, femoral, popliteal, posterior tibial, peroneal and, as noted, gastrocenemius veins. The equipment used to perform the examination is the [employer] x300 duplex ultrasound machine utilizing 8-3 MHz transducers. Compression, augmentation and phasicity (as appropriate) were evaluated to rule-out venous thrombosis or obstruction. Spectral Doppler analysis and confirmatory images were documented.     Findings:  Common Femoral Vein (CFV) : The common femoral vein is, positive compress, positive augment, positive phasic.    Contralateral CFV: The contralateral vein is positive compress, positive augment, positive phasic.    Saphenofemoral Junction (SFJ): The saphenofemoral junction is positive compress.    Proximal Femoral Vein: The proximal femoral vein is positive compress.    Mid Femoral Vein: The mid femoral vein is positive compress.    Distal Femoral Vein: The distal femoral vein is positive compress.    Popliteal Vein: The popliteal vein is positive compress, positive augment, positive phasic.    Saphenopopliteal Junction (SPJ): The saphenopopliteal junction is positive compress.    Posterior Tibial Veins: The posterior tibial veins are positive compress.    Peroneal Veins: The peroneal veins are positive compress.    Gastrocnemius Veins: The gastrocnemius veins are positive compress.    Additional Findings: N/A    IMPRESSION: Normal deep venous system at the RIGHT lower extremity with no evidence of venous thrombosis or obstruction in the veins evaluated.

## 2025-06-13 ENCOUNTER — TELEPHONE (OUTPATIENT)
Dept: PRIMARY CARE | Facility: CLINIC | Age: 75
End: 2025-06-13
Payer: MEDICARE

## 2025-06-13 NOTE — TELEPHONE ENCOUNTER
Patient called and left voicemail    Stating she is having pain in R leg  on and off  Had a bump but it went away  Pain was so severe she almost called EMS   She is wearing her    She said she just wanted you know  Please advise if there is any suggestions or anything she should do

## 2025-06-17 DIAGNOSIS — N39.0 URINARY TRACT INFECTION WITHOUT HEMATURIA, SITE UNSPECIFIED: ICD-10-CM

## 2025-06-17 RX ORDER — NITROFURANTOIN MACROCRYSTALS 50 MG/1
50 CAPSULE ORAL EVERY 12 HOURS
Qty: 4 CAPSULE | Refills: 0 | Status: SHIPPED | OUTPATIENT
Start: 2025-06-17 | End: 2025-06-19

## 2025-06-19 LAB
APPEARANCE UR: CLEAR
BACTERIA #/AREA URNS HPF: ABNORMAL /HPF
BACTERIA UR CULT: ABNORMAL
BILIRUB UR QL STRIP: NEGATIVE
COLOR UR: YELLOW
GLUCOSE UR QL STRIP: NEGATIVE
HGB UR QL STRIP: NEGATIVE
HYALINE CASTS #/AREA URNS LPF: ABNORMAL /LPF
KETONES UR QL STRIP: NEGATIVE
LEUKOCYTE ESTERASE UR QL STRIP: ABNORMAL
NITRITE UR QL STRIP: NEGATIVE
PH UR STRIP: 5.5 [PH] (ref 5–8)
PROT UR QL STRIP: NEGATIVE
RBC #/AREA URNS HPF: ABNORMAL /HPF
SERVICE CMNT-IMP: ABNORMAL
SP GR UR STRIP: 1.01 (ref 1–1.03)
SQUAMOUS #/AREA URNS HPF: ABNORMAL /HPF
WBC #/AREA URNS HPF: ABNORMAL /HPF

## 2025-06-20 DIAGNOSIS — N39.0 URINARY TRACT INFECTION WITHOUT HEMATURIA, SITE UNSPECIFIED: ICD-10-CM

## 2025-06-20 RX ORDER — NITROFURANTOIN 25; 75 MG/1; MG/1
100 CAPSULE ORAL EVERY 12 HOURS
Qty: 10 CAPSULE | Refills: 0 | Status: SHIPPED | OUTPATIENT
Start: 2025-06-20 | End: 2025-06-25

## 2025-06-23 ENCOUNTER — APPOINTMENT (OUTPATIENT)
Dept: UROLOGY | Facility: CLINIC | Age: 75
End: 2025-06-23
Payer: MEDICARE

## 2025-06-23 VITALS
RESPIRATION RATE: 18 BRPM | SYSTOLIC BLOOD PRESSURE: 137 MMHG | BODY MASS INDEX: 28.41 KG/M2 | HEART RATE: 68 BPM | WEIGHT: 181 LBS | DIASTOLIC BLOOD PRESSURE: 63 MMHG | HEIGHT: 67 IN

## 2025-06-23 DIAGNOSIS — N39.0 URINARY TRACT INFECTION WITHOUT HEMATURIA, SITE UNSPECIFIED: ICD-10-CM

## 2025-06-23 DIAGNOSIS — N28.1 RENAL CYSTS, ACQUIRED, BILATERAL: Primary | ICD-10-CM

## 2025-06-23 DIAGNOSIS — N18.31 CHRONIC KIDNEY DISEASE, STAGE 3A (MULTI): ICD-10-CM

## 2025-06-23 DIAGNOSIS — N28.1 RENAL CYSTS, ACQUIRED, BILATERAL: ICD-10-CM

## 2025-06-23 LAB
POC APPEARANCE, URINE: CLEAR
POC BILIRUBIN, URINE: NEGATIVE
POC BLOOD, URINE: NEGATIVE
POC COLOR, URINE: YELLOW
POC GLUCOSE, URINE: NEGATIVE MG/DL
POC KETONES, URINE: NEGATIVE MG/DL
POC LEUKOCYTES, URINE: ABNORMAL
POC NITRITE,URINE: NEGATIVE
POC PH, URINE: 6 PH
POC PROTEIN, URINE: NEGATIVE MG/DL
POC SPECIFIC GRAVITY, URINE: 1.01
POC UROBILINOGEN, URINE: 1 EU/DL

## 2025-06-23 PROCEDURE — 99213 OFFICE O/P EST LOW 20 MIN: CPT | Performed by: UROLOGY

## 2025-06-23 PROCEDURE — 52000 CYSTOURETHROSCOPY: CPT | Performed by: UROLOGY

## 2025-06-23 PROCEDURE — 81003 URINALYSIS AUTO W/O SCOPE: CPT | Performed by: UROLOGY

## 2025-06-23 PROCEDURE — 51798 US URINE CAPACITY MEASURE: CPT | Performed by: UROLOGY

## 2025-06-23 RX ORDER — CIPROFLOXACIN 500 MG/1
500 TABLET, FILM COATED ORAL
Qty: 40 TABLET | Refills: 3 | Status: SHIPPED | OUTPATIENT
Start: 2025-06-23 | End: 2026-07-01

## 2025-06-23 ASSESSMENT — ENCOUNTER SYMPTOMS
OCCASIONAL FEELINGS OF UNSTEADINESS: 0
DEPRESSION: 0
LOSS OF SENSATION IN FEET: 0

## 2025-06-23 NOTE — PROGRESS NOTES
"  75-year-old female referred by Dr. Raven Dick for \"kidney cyst\".  CKD stage III.  MRI of the abdomen in May 2024 identified 5.5 cm right renal cyst, Bosniak 2.  Creatinine 1.16.  Patient is presently on Plavix.  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  No family history of breast or prostate cancer.  The patient has never smoked cigarettes.     PLAVIX     March 3, 2025, patient arrives alone.  She has a complicated medical history.  Multiple UTIs over many years originally saw Dr. Klaus Hess from [facility] infectious disease.  Has not seen him for several years.  She also underwent 4 separate bladder lifts under the care of Dr. Marcos Guardado.  She had a hysterectomy prior.  She has seen Dr. Tony Jean Baptiste and had a cystoscopy approximately 2 years ago.  She states that she was told she had \"two openings\" where the urine emanates.  Possibly, a fistula but she does not have incontinence.  She states that catheterization is extremely painful.  I suspect she may have a urethral stricture.  In any case she will repeat her urinalysis and urine culture now as they were mixed and with bacteria.  Urine cytology was also negative for malignancy.  We will also obtain another renal ultrasound as it has been approximately 1 year.  She will return for cystoscopy, flow studies and a discussion of treatment options.  She may benefit from an alpha agent and she is very strongly would like a maintenance antibiotic either in the form of daily or multiple times a week.  We do not have a positive culture on her at this time.     March 10, 2025, cystoscopy, flow studies and a discussion of treatment options.  Severe erythema in the lower half of the bladder.  No stones or tumors.  No stricture.  PVR 1 cc.  Renal ultrasound showed the 5.3 cm right renal cyst unchanged.  We will initiate Macrodantin 50 mg daily and have her return in 3 months.    March 19, 2025, telephone call, Klebsiella UTI.  Macrobid ordered    June 23, " 2025, cystoscopy today shows improvement which much less erythema.  However, she is experiencing a mild cough with the Macrodantin.  We will discontinue.  She is allergic to sulfa medication.  Keflex was resistant to her UTIs.  She has an allergy to penicillins and amoxicillin.  Therefore we will try Cipro 3 times a week.  She will return in February.     PLAN:     #1 the patient will return in 2026 with a cystoscopy, PVR, urinalysis and urine culture and renal ultrasound     2.  Cipro 500 mg 3 times a week      This note was created with voice-recognition software and was not corrected for typographical or grammatical errors.

## 2025-06-23 NOTE — PATIENT INSTRUCTIONS
It was nice to see you once again today.  You underwent a successful cystoscopy.  You are emptying your bladder very well with a 0 cc residual.  You do not have any stones or tumors.  You do have less inflammation within the bladder.  We will have you start Cipro 3 times a week.  The ultrasound of the kidneys once again showed a stable cyst in the right kidney measuring 5.3 cm.  I will see you again in February.     This note was created with voice-recognition software and was not corrected for typographical or grammatical errors.

## 2025-06-23 NOTE — LETTER
"June 23, 2025     Apple Tello MD  [address] [facility] [place] [address]    Patient: Daysi Resendiz   YOB: 1950   Date of Visit: 6/23/2025       Dear Dr. Apple Tello MD:    Thank you for referring Daysi Resendiz to me for evaluation. Below are my notes for this consultation.  If you have questions, please do not hesitate to call me. I look forward to following your patient along with you.       Sincerely,     Kirk Romero MD      CC: No Recipients  ______________________________________________________________________________________      75-year-old female referred by Dr. Raven Dick for \"kidney cyst\".  CKD stage III.  MRI of the abdomen in May 2024 identified 5.5 cm right renal cyst, Bosniak 2.  Creatinine 1.16.  Patient is presently on Plavix.  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  No family history of breast or prostate cancer.  The patient has never smoked cigarettes.     PLAVIX     March 3, 2025, patient arrives alone.  She has a complicated medical history.  Multiple UTIs over many years originally saw Dr. Klaus Hess from [facility] infectious [facility].  Has not seen him for several years.  She also underwent 4 separate bladder lifts under the care of Dr. Marcos Guardado.  She had a hysterectomy prior.  She has seen Dr. Tony Jean Baptiste and had a cystoscopy approximately 2 years ago.  She states that she was told she had \"two openings\" where the urine emanates.  Possibly, a fistula but she does not have incontinence.  She states that catheterization is extremely painful.  I suspect she may have a urethral stricture.  In any case she will repeat her urinalysis and urine culture now as they were mixed and with bacteria.  Urine cytology was also negative for malignancy.  We will also obtain another renal ultrasound as it has been approximately 1 year.  She will return for cystoscopy, flow studies and a discussion of treatment options.  She may benefit from an " "alpha agent and she is very strongly would like a maintenance antibiotic either in the form of daily or multiple times a week.  We do not have a positive culture on her at this time.     March 10, 2025, cystoscopy, flow studies and a discussion of treatment options.  Severe erythema in the lower half of the bladder.  No stones or tumors.  No stricture.  PVR 1 cc.  Renal ultrasound showed the 5.3 cm right renal cyst unchanged.  We will initiate Macrodantin 50 mg daily and have her return in 3 months.    March 19, 2025, telephone call, Klebsiella UTI.  Macrobid ordered    June 23, 2025, cystoscopy today shows improvement which much less erythema.  However, she is experiencing a mild cough with the Macrodantin.  We will discontinue.  She is allergic to sulfa medication.  Keflex was resistant to her UTIs.  She has an allergy to penicillins and amoxicillin.  Therefore we will try Cipro 3 times a week.  She will return in February.     PLAN:     #1 the patient will return in 2026 with a cystoscopy, PVR, urinalysis and urine culture and renal ultrasound     2.  Cipro 500 mg 3 times a week      This note was created with voice-recognition software and was not corrected for typographical or grammatical errors.           Patient ID: Daysi Resendiz is a 75 y.o. female.    Procedures  Pt took Nitrofurantoin as prescribed  Anesthesia: Local 2% Lidocaine  Instruments: 6F flexible disposable Cystoscope    Pt brought to procedure room and placed in dorsal lithotomy position. Pt draped and prepped in normal sterile fashion. 5ml lidocaine instilled into urethra (vagina). Pt tolerated well.    I was present as chaperone for the entirety of the procedure   Neeta Garza  Cystoscopy performed by Dr. Kirk Romero      Bedside \"Time Out\" Verification   Today's Date:  6/23/2025. I attest that this time out verification took place prior to the procedure.   Procedure: Cysto   RN/LPN/MA:    Provider: [facility].   Verified By: Neeta ENRIQUEZ " Greg and Provider, Dr. Kirk Romero.   Prior to the start of the procedure a time out was taken and the following were verified: the identity of the patient using two patient identifiers, the correct procedure, the correct site marked as indicated, the correct positioning for the patient and the correct equipment was obtained.   Cystoscopy - Daysi Resendiz -identified using two (2) forms of identification.   Procedure: diagnostic 2:00pm Time Completed: 2:54 PM  Indications for procedure: Surveillance Cystoscopy due to recurrent uti           Discussed with patient: Risks, benefits, and alternative were discussed in detail. Patient appears to understand and agrees to proceed. Patient has signed the procedure consent form.    CYSTOSCOPY:    Cystoscopy today reveals a normal urethra and bladder neck.  Once inside the bladder, both ureteral orifices were identified.  Very deep parapelvic gutters.  Improved erythema.  No tumors or stones.  PVR 0 cc.

## 2025-06-23 NOTE — PROGRESS NOTES
"Patient ID: Daysi Resendiz is a 75 y.o. female.    Procedures  Pt took Nitrofurantoin as prescribed  Anesthesia: Local 2% Lidocaine  Instruments: 6F flexible disposable Cystoscope    Pt brought to procedure room and placed in dorsal lithotomy position. Pt draped and prepped in normal sterile fashion. 5ml lidocaine instilled into urethra (vagina). Pt tolerated well.    I was present as chaperone for the entirety of the procedure   Neeta Garza  Cystoscopy performed by Dr. Kirk Romero      Bedside \"Time Out\" Verification   Today's Date:  6/23/2025. I attest that this time out verification took place prior to the procedure.   Procedure: Cysto   RN/LPN/MA:    Provider: [facility].   Verified By: MA, Neeta Garza and Provider, Dr. Kirk Romero.   Prior to the start of the procedure a time out was taken and the following were verified: the identity of the patient using two patient identifiers, the correct procedure, the correct site marked as indicated, the correct positioning for the patient and the correct equipment was obtained.   Cystoscopy - Daysi Resendiz -identified using two (2) forms of identification.   Procedure: diagnostic 2:00pm Time Completed: 2:54 PM  Indications for procedure: Surveillance Cystoscopy due to recurrent uti           Discussed with patient: Risks, benefits, and alternative were discussed in detail. Patient appears to understand and agrees to proceed. Patient has signed the procedure consent form.    CYSTOSCOPY:    Cystoscopy today reveals a normal urethra and bladder neck.  Once inside the bladder, both ureteral orifices were identified.  Very deep parapelvic gutters.  Improved erythema.  No tumors or stones.  PVR 0 cc.  "

## 2025-07-01 ENCOUNTER — APPOINTMENT (OUTPATIENT)
Dept: CARDIOLOGY | Facility: CLINIC | Age: 75
End: 2025-07-01
Payer: COMMERCIAL

## 2025-07-01 VITALS
HEART RATE: 70 BPM | BODY MASS INDEX: 29.65 KG/M2 | SYSTOLIC BLOOD PRESSURE: 136 MMHG | WEIGHT: 189.3 LBS | DIASTOLIC BLOOD PRESSURE: 62 MMHG

## 2025-07-01 DIAGNOSIS — Z87.891 FORMER SMOKER: ICD-10-CM

## 2025-07-01 DIAGNOSIS — E78.2 MIXED HYPERLIPIDEMIA: ICD-10-CM

## 2025-07-01 DIAGNOSIS — I10 BENIGN ESSENTIAL HYPERTENSION: ICD-10-CM

## 2025-07-01 DIAGNOSIS — I65.23 BILATERAL CAROTID ARTERY STENOSIS: ICD-10-CM

## 2025-07-01 DIAGNOSIS — R07.89 OTHER CHEST PAIN: ICD-10-CM

## 2025-07-01 DIAGNOSIS — R06.02 SHORTNESS OF BREATH ON EXERTION: ICD-10-CM

## 2025-07-01 DIAGNOSIS — E27.1 ADDISON DISEASE (MULTI): ICD-10-CM

## 2025-07-01 PROCEDURE — 1159F MED LIST DOCD IN RCRD: CPT | Performed by: INTERNAL MEDICINE

## 2025-07-01 PROCEDURE — 3075F SYST BP GE 130 - 139MM HG: CPT | Performed by: INTERNAL MEDICINE

## 2025-07-01 PROCEDURE — 1036F TOBACCO NON-USER: CPT | Performed by: INTERNAL MEDICINE

## 2025-07-01 PROCEDURE — 3078F DIAST BP <80 MM HG: CPT | Performed by: INTERNAL MEDICINE

## 2025-07-01 PROCEDURE — 99214 OFFICE O/P EST MOD 30 MIN: CPT | Performed by: INTERNAL MEDICINE

## 2025-07-01 RX ORDER — METOPROLOL SUCCINATE 50 MG/1
50 TABLET, EXTENDED RELEASE ORAL DAILY
Qty: 90 TABLET | Refills: 3 | Status: SHIPPED | OUTPATIENT
Start: 2025-07-01 | End: 2026-07-01

## 2025-07-01 ASSESSMENT — ENCOUNTER SYMPTOMS
RESPIRATORY NEGATIVE: 1
CARDIOVASCULAR NEGATIVE: 1
NEUROLOGICAL NEGATIVE: 1
CONSTITUTIONAL NEGATIVE: 1

## 2025-07-01 NOTE — PATIENT INSTRUCTIONS
Continue same medications and treatments.   Patient educated on proper medication use.   Patient educated on risk factor modification.   Please bring any lab results from other providers / physicians to your next appointment.     Please bring all medicines, vitamins, and herbal supplements with you when you come to the office.     Prescriptions will not be filled unless you are compliant with your follow up appointments or have a follow up appointment scheduled as per instruction of your physician. Refills should be requested at the time of your visit.  3 month visit

## 2025-07-09 DIAGNOSIS — N39.0 URINARY TRACT INFECTION WITHOUT HEMATURIA, SITE UNSPECIFIED: ICD-10-CM

## 2025-07-30 DIAGNOSIS — N39.0 URINARY TRACT INFECTION WITHOUT HEMATURIA, SITE UNSPECIFIED: Primary | ICD-10-CM

## 2025-07-30 RX ORDER — NITROFURANTOIN 25; 75 MG/1; MG/1
100 CAPSULE ORAL EVERY 12 HOURS
Qty: 10 CAPSULE | Refills: 0 | Status: SHIPPED | OUTPATIENT
Start: 2025-07-30

## 2025-08-20 ENCOUNTER — OFFICE VISIT (OUTPATIENT)
Facility: CLINIC | Age: 75
End: 2025-08-20
Payer: MEDICARE

## 2025-08-20 VITALS
RESPIRATION RATE: 20 BRPM | HEART RATE: 71 BPM | SYSTOLIC BLOOD PRESSURE: 134 MMHG | BODY MASS INDEX: 28.95 KG/M2 | TEMPERATURE: 97.7 F | DIASTOLIC BLOOD PRESSURE: 65 MMHG | WEIGHT: 191 LBS | OXYGEN SATURATION: 98 % | HEIGHT: 68 IN

## 2025-08-20 DIAGNOSIS — K46.9 ENTEROCELE: ICD-10-CM

## 2025-08-20 DIAGNOSIS — R30.0 DYSURIA: ICD-10-CM

## 2025-08-20 DIAGNOSIS — N39.41 URGE INCONTINENCE: ICD-10-CM

## 2025-08-20 DIAGNOSIS — N39.0 RECURRENT URINARY TRACT INFECTION: Primary | ICD-10-CM

## 2025-08-20 DIAGNOSIS — Z86.15 HISTORY OF LATENT TUBERCULOSIS: ICD-10-CM

## 2025-08-20 LAB
ANION GAP SERPL CALC-SCNC: 14 MMOL/L (ref 10–20)
APPEARANCE UR: ABNORMAL
BILIRUB UR STRIP.AUTO-MCNC: NEGATIVE MG/DL
BUN SERPL-MCNC: 26 MG/DL (ref 6–23)
CALCIUM SERPL-MCNC: 9.7 MG/DL (ref 8.6–10.3)
CHLORIDE SERPL-SCNC: 101 MMOL/L (ref 98–107)
CO2 SERPL-SCNC: 27 MMOL/L (ref 21–32)
COLOR UR: ABNORMAL
CREAT SERPL-MCNC: 1.37 MG/DL (ref 0.5–1.05)
EGFRCR SERPLBLD CKD-EPI 2021: 40 ML/MIN/1.73M*2
ERYTHROCYTE [DISTWIDTH] IN BLOOD BY AUTOMATED COUNT: 13.2 % (ref 11.5–14.5)
GLUCOSE SERPL-MCNC: 97 MG/DL (ref 74–99)
GLUCOSE UR STRIP.AUTO-MCNC: NORMAL MG/DL
HCT VFR BLD AUTO: 38.2 % (ref 36–46)
HGB BLD-MCNC: 13.2 G/DL (ref 12–16)
KETONES UR STRIP.AUTO-MCNC: NEGATIVE MG/DL
LEUKOCYTE ESTERASE UR QL STRIP.AUTO: ABNORMAL
MCH RBC QN AUTO: 31.4 PG (ref 26–34)
MCHC RBC AUTO-ENTMCNC: 34.6 G/DL (ref 32–36)
MCV RBC AUTO: 91 FL (ref 80–100)
NITRITE UR QL STRIP.AUTO: ABNORMAL
NRBC BLD-RTO: 0 /100 WBCS (ref 0–0)
PH UR STRIP.AUTO: 6 [PH]
PLATELET # BLD AUTO: 235 X10*3/UL (ref 150–450)
POTASSIUM SERPL-SCNC: 4 MMOL/L (ref 3.5–5.3)
PROT UR STRIP.AUTO-MCNC: NEGATIVE MG/DL
RBC # BLD AUTO: 4.21 X10*6/UL (ref 4–5.2)
RBC # UR STRIP.AUTO: NEGATIVE MG/DL
RBC #/AREA URNS AUTO: ABNORMAL /HPF
SODIUM SERPL-SCNC: 138 MMOL/L (ref 136–145)
SP GR UR STRIP.AUTO: 1.02
SQUAMOUS #/AREA URNS AUTO: ABNORMAL /HPF
UROBILINOGEN UR STRIP.AUTO-MCNC: NORMAL MG/DL
WBC # BLD AUTO: 7.3 X10*3/UL (ref 4.4–11.3)
WBC #/AREA URNS AUTO: >50 /HPF
WBC CLUMPS #/AREA URNS AUTO: ABNORMAL /HPF

## 2025-08-20 PROCEDURE — 3075F SYST BP GE 130 - 139MM HG: CPT | Performed by: INTERNAL MEDICINE

## 2025-08-20 PROCEDURE — 1125F AMNT PAIN NOTED PAIN PRSNT: CPT | Performed by: INTERNAL MEDICINE

## 2025-08-20 PROCEDURE — 99214 OFFICE O/P EST MOD 30 MIN: CPT | Performed by: INTERNAL MEDICINE

## 2025-08-20 PROCEDURE — 87086 URINE CULTURE/COLONY COUNT: CPT | Mod: ELYLAB | Performed by: INTERNAL MEDICINE

## 2025-08-20 PROCEDURE — 80048 BASIC METABOLIC PNL TOTAL CA: CPT | Performed by: INTERNAL MEDICINE

## 2025-08-20 PROCEDURE — 36415 COLL VENOUS BLD VENIPUNCTURE: CPT | Performed by: INTERNAL MEDICINE

## 2025-08-20 PROCEDURE — 86481 TB AG RESPONSE T-CELL SUSP: CPT | Performed by: INTERNAL MEDICINE

## 2025-08-20 PROCEDURE — 81001 URINALYSIS AUTO W/SCOPE: CPT | Performed by: INTERNAL MEDICINE

## 2025-08-20 PROCEDURE — 85027 COMPLETE CBC AUTOMATED: CPT | Performed by: INTERNAL MEDICINE

## 2025-08-20 PROCEDURE — 3078F DIAST BP <80 MM HG: CPT | Performed by: INTERNAL MEDICINE

## 2025-08-20 ASSESSMENT — PAIN SCALES - GENERAL: PAINLEVEL_OUTOF10: 2

## 2025-08-21 LAB — HOLD SPECIMEN: NORMAL

## 2025-08-22 LAB
BACTERIA UR CULT: ABNORMAL
NIL(NEG) CONTROL SPOT COUNT: NORMAL
PANEL A SPOT COUNT: 0
PANEL B SPOT COUNT: 1
POS CONTROL SPOT COUNT: NORMAL
T-SPOT. TB INTERPRETATION: NEGATIVE

## 2025-08-25 RX ORDER — GRANULES FOR ORAL 3 G/1
3 POWDER ORAL
Qty: 3 G | Refills: 0 | Status: SHIPPED | OUTPATIENT
Start: 2025-08-25 | End: 2025-08-27

## 2025-08-26 ENCOUNTER — DOCUMENTATION (OUTPATIENT)
Facility: CLINIC | Age: 75
End: 2025-08-26
Payer: MEDICARE

## 2025-09-05 ENCOUNTER — DOCUMENTATION (OUTPATIENT)
Facility: CLINIC | Age: 75
End: 2025-09-05
Payer: MEDICARE

## 2025-09-05 ENCOUNTER — DOCUMENTATION (OUTPATIENT)
Dept: INFECTIOUS DISEASES | Facility: HOSPITAL | Age: 75
End: 2025-09-05
Payer: MEDICARE

## 2025-09-08 ENCOUNTER — APPOINTMENT (OUTPATIENT)
Dept: PRIMARY CARE | Facility: CLINIC | Age: 75
End: 2025-09-08
Payer: MEDICARE

## 2025-09-30 ENCOUNTER — APPOINTMENT (OUTPATIENT)
Dept: CARDIOLOGY | Facility: CLINIC | Age: 75
End: 2025-09-30
Payer: MEDICARE

## 2026-02-23 ENCOUNTER — APPOINTMENT (OUTPATIENT)
Dept: UROLOGY | Facility: CLINIC | Age: 76
End: 2026-02-23
Payer: MEDICARE